# Patient Record
Sex: FEMALE | Race: WHITE | URBAN - METROPOLITAN AREA
[De-identification: names, ages, dates, MRNs, and addresses within clinical notes are randomized per-mention and may not be internally consistent; named-entity substitution may affect disease eponyms.]

---

## 2017-09-10 ENCOUNTER — TRANSFERRED RECORDS (OUTPATIENT)
Dept: HEALTH INFORMATION MANAGEMENT | Facility: CLINIC | Age: 47
End: 2017-09-10

## 2017-09-14 ENCOUNTER — TRANSFERRED RECORDS (OUTPATIENT)
Dept: HEALTH INFORMATION MANAGEMENT | Facility: CLINIC | Age: 47
End: 2017-09-14

## 2017-09-15 ENCOUNTER — TRANSFERRED RECORDS (OUTPATIENT)
Dept: HEALTH INFORMATION MANAGEMENT | Facility: CLINIC | Age: 47
End: 2017-09-15

## 2017-09-19 ENCOUNTER — TRANSFERRED RECORDS (OUTPATIENT)
Dept: HEALTH INFORMATION MANAGEMENT | Facility: CLINIC | Age: 47
End: 2017-09-19

## 2017-09-25 ENCOUNTER — HOSPITAL ENCOUNTER (INPATIENT)
Facility: CLINIC | Age: 47
LOS: 9 days | Discharge: NURSING, REHAB OR LONG-TERM CARE | DRG: 742 | End: 2017-10-04
Attending: OBSTETRICS & GYNECOLOGY | Admitting: OBSTETRICS & GYNECOLOGY
Payer: COMMERCIAL

## 2017-09-25 ENCOUNTER — APPOINTMENT (OUTPATIENT)
Dept: GENERAL RADIOLOGY | Facility: CLINIC | Age: 47
DRG: 742 | End: 2017-09-25
Attending: OBSTETRICS & GYNECOLOGY
Payer: COMMERCIAL

## 2017-09-25 DIAGNOSIS — Z90.710 S/P HYSTERECTOMY: ICD-10-CM

## 2017-09-25 DIAGNOSIS — G62.9 NEUROPATHY: ICD-10-CM

## 2017-09-25 DIAGNOSIS — D70.8 OTHER NEUTROPENIA (H): ICD-10-CM

## 2017-09-25 DIAGNOSIS — R19.00 PELVIC MASS: Primary | ICD-10-CM

## 2017-09-25 DIAGNOSIS — G61.0 GUILLAIN-BARRE SYNDROME (H): ICD-10-CM

## 2017-09-25 DIAGNOSIS — K63.0 INTESTINAL DIVERTICULAR ABSCESS: ICD-10-CM

## 2017-09-25 DIAGNOSIS — G89.18 ACUTE POST-OPERATIVE PAIN: ICD-10-CM

## 2017-09-25 LAB
ALBUMIN SERPL-MCNC: 3 G/DL (ref 3.4–5)
ALP SERPL-CCNC: 60 U/L (ref 40–150)
ALT SERPL W P-5'-P-CCNC: 48 U/L (ref 0–50)
ANION GAP SERPL CALCULATED.3IONS-SCNC: 8 MMOL/L (ref 3–14)
AST SERPL W P-5'-P-CCNC: 45 U/L (ref 0–45)
BASOPHILS # BLD AUTO: 0 10E9/L (ref 0–0.2)
BASOPHILS NFR BLD AUTO: 1.3 %
BILIRUB SERPL-MCNC: 0.3 MG/DL (ref 0.2–1.3)
BUN SERPL-MCNC: 12 MG/DL (ref 7–30)
CALCIUM SERPL-MCNC: 9.4 MG/DL (ref 8.5–10.1)
CHLORIDE SERPL-SCNC: 102 MMOL/L (ref 94–109)
CO2 SERPL-SCNC: 25 MMOL/L (ref 20–32)
CREAT SERPL-MCNC: 0.66 MG/DL (ref 0.52–1.04)
DIFFERENTIAL METHOD BLD: ABNORMAL
EOSINOPHIL # BLD AUTO: 0.1 10E9/L (ref 0–0.7)
EOSINOPHIL NFR BLD AUTO: 2.2 %
ERYTHROCYTE [DISTWIDTH] IN BLOOD BY AUTOMATED COUNT: 19 % (ref 10–15)
GFR SERPL CREATININE-BSD FRML MDRD: >90 ML/MIN/1.7M2
GLUCOSE SERPL-MCNC: 83 MG/DL (ref 70–99)
HCT VFR BLD AUTO: 37.2 % (ref 35–47)
HGB BLD-MCNC: 12 G/DL (ref 11.7–15.7)
IMM GRANULOCYTES # BLD: 0 10E9/L (ref 0–0.4)
IMM GRANULOCYTES NFR BLD: 0.3 %
LYMPHOCYTES # BLD AUTO: 1 10E9/L (ref 0.8–5.3)
LYMPHOCYTES NFR BLD AUTO: 30.4 %
MAGNESIUM SERPL-MCNC: 2.3 MG/DL (ref 1.6–2.3)
MCH RBC QN AUTO: 27.7 PG (ref 26.5–33)
MCHC RBC AUTO-ENTMCNC: 32.3 G/DL (ref 31.5–36.5)
MCV RBC AUTO: 86 FL (ref 78–100)
MONOCYTES # BLD AUTO: 0.5 10E9/L (ref 0–1.3)
MONOCYTES NFR BLD AUTO: 16 %
NEUTROPHILS # BLD AUTO: 1.6 10E9/L (ref 1.6–8.3)
NEUTROPHILS NFR BLD AUTO: 49.8 %
NRBC # BLD AUTO: 0 10*3/UL
NRBC BLD AUTO-RTO: 0 /100
PLATELET # BLD AUTO: 188 10E9/L (ref 150–450)
POTASSIUM SERPL-SCNC: 4.3 MMOL/L (ref 3.4–5.3)
PROT SERPL-MCNC: 8.8 G/DL (ref 6.8–8.8)
RBC # BLD AUTO: 4.33 10E12/L (ref 3.8–5.2)
SODIUM SERPL-SCNC: 135 MMOL/L (ref 133–144)
WBC # BLD AUTO: 3.2 10E9/L (ref 4–11)

## 2017-09-25 PROCEDURE — 40000275 ZZH STATISTIC RCP TIME EA 10 MIN

## 2017-09-25 PROCEDURE — 94150 VITAL CAPACITY TEST: CPT

## 2017-09-25 PROCEDURE — 93010 ELECTROCARDIOGRAM REPORT: CPT | Performed by: INTERNAL MEDICINE

## 2017-09-25 PROCEDURE — 12000001 ZZH R&B MED SURG/OB UMMC

## 2017-09-25 PROCEDURE — 36592 COLLECT BLOOD FROM PICC: CPT | Performed by: NURSE PRACTITIONER

## 2017-09-25 PROCEDURE — 93005 ELECTROCARDIOGRAM TRACING: CPT

## 2017-09-25 PROCEDURE — 25000128 H RX IP 250 OP 636: Performed by: NURSE PRACTITIONER

## 2017-09-25 PROCEDURE — 80053 COMPREHEN METABOLIC PANEL: CPT | Performed by: NURSE PRACTITIONER

## 2017-09-25 PROCEDURE — 25000132 ZZH RX MED GY IP 250 OP 250 PS 637: Performed by: OBSTETRICS & GYNECOLOGY

## 2017-09-25 PROCEDURE — 85025 COMPLETE CBC W/AUTO DIFF WBC: CPT | Performed by: NURSE PRACTITIONER

## 2017-09-25 PROCEDURE — 25000132 ZZH RX MED GY IP 250 OP 250 PS 637: Performed by: NURSE PRACTITIONER

## 2017-09-25 PROCEDURE — 40000986 XR CHEST PORT 1 VW

## 2017-09-25 PROCEDURE — 83735 ASSAY OF MAGNESIUM: CPT | Performed by: NURSE PRACTITIONER

## 2017-09-25 RX ORDER — POLYETHYLENE GLYCOL 3350 17 G/17G
17 POWDER, FOR SOLUTION ORAL DAILY PRN
Status: DISCONTINUED | OUTPATIENT
Start: 2017-09-25 | End: 2017-09-27

## 2017-09-25 RX ORDER — CALCIUM CARBONATE 500 MG/1
500 TABLET, CHEWABLE ORAL 2 TIMES DAILY PRN
Status: DISCONTINUED | OUTPATIENT
Start: 2017-09-25 | End: 2017-09-27

## 2017-09-25 RX ORDER — PROCHLORPERAZINE 25 MG
25 SUPPOSITORY, RECTAL RECTAL EVERY 12 HOURS PRN
Status: DISCONTINUED | OUTPATIENT
Start: 2017-09-25 | End: 2017-09-27

## 2017-09-25 RX ORDER — GABAPENTIN 300 MG/1
600 CAPSULE ORAL 3 TIMES DAILY
Status: DISCONTINUED | OUTPATIENT
Start: 2017-09-25 | End: 2017-09-25

## 2017-09-25 RX ORDER — HYDROXYZINE HYDROCHLORIDE 25 MG/1
50 TABLET, FILM COATED ORAL EVERY 6 HOURS PRN
Status: DISCONTINUED | OUTPATIENT
Start: 2017-09-25 | End: 2017-09-28

## 2017-09-25 RX ORDER — NALOXONE HYDROCHLORIDE 0.4 MG/ML
.1-.4 INJECTION, SOLUTION INTRAMUSCULAR; INTRAVENOUS; SUBCUTANEOUS
Status: DISCONTINUED | OUTPATIENT
Start: 2017-09-25 | End: 2017-09-27

## 2017-09-25 RX ORDER — HYDROXYZINE HYDROCHLORIDE 25 MG/1
25 TABLET, FILM COATED ORAL EVERY 6 HOURS PRN
Status: DISCONTINUED | OUTPATIENT
Start: 2017-09-25 | End: 2017-09-28

## 2017-09-25 RX ORDER — HYDROMORPHONE HYDROCHLORIDE 1 MG/ML
0.5 INJECTION, SOLUTION INTRAMUSCULAR; INTRAVENOUS; SUBCUTANEOUS
Status: DISCONTINUED | OUTPATIENT
Start: 2017-09-25 | End: 2017-09-26

## 2017-09-25 RX ORDER — NALOXONE HYDROCHLORIDE 0.4 MG/ML
.1-.4 INJECTION, SOLUTION INTRAMUSCULAR; INTRAVENOUS; SUBCUTANEOUS
Status: DISCONTINUED | OUTPATIENT
Start: 2017-09-25 | End: 2017-09-25

## 2017-09-25 RX ORDER — CARBOXYMETHYLCELLULOSE SODIUM 5 MG/ML
1 SOLUTION/ DROPS OPHTHALMIC 3 TIMES DAILY PRN
Status: DISCONTINUED | OUTPATIENT
Start: 2017-09-25 | End: 2017-09-27

## 2017-09-25 RX ORDER — AMOXICILLIN 250 MG
2 CAPSULE ORAL 2 TIMES DAILY
COMMUNITY

## 2017-09-25 RX ORDER — OXYCODONE HYDROCHLORIDE 5 MG/1
5-10 TABLET ORAL EVERY 4 HOURS PRN
Status: DISCONTINUED | OUTPATIENT
Start: 2017-09-25 | End: 2017-09-26

## 2017-09-25 RX ORDER — AMOXICILLIN 250 MG
1-2 CAPSULE ORAL 2 TIMES DAILY
Status: DISCONTINUED | OUTPATIENT
Start: 2017-09-25 | End: 2017-09-27

## 2017-09-25 RX ORDER — ONDANSETRON 4 MG/1
4 TABLET, ORALLY DISINTEGRATING ORAL EVERY 6 HOURS PRN
Status: DISCONTINUED | OUTPATIENT
Start: 2017-09-25 | End: 2017-09-27

## 2017-09-25 RX ORDER — HYDROMORPHONE HYDROCHLORIDE 1 MG/ML
.3-.5 INJECTION, SOLUTION INTRAMUSCULAR; INTRAVENOUS; SUBCUTANEOUS
Status: DISCONTINUED | OUTPATIENT
Start: 2017-09-25 | End: 2017-09-25

## 2017-09-25 RX ORDER — GABAPENTIN 300 MG/1
900 CAPSULE ORAL 2 TIMES DAILY
Status: DISCONTINUED | OUTPATIENT
Start: 2017-09-25 | End: 2017-09-26

## 2017-09-25 RX ORDER — ONDANSETRON 2 MG/ML
4 INJECTION INTRAMUSCULAR; INTRAVENOUS EVERY 6 HOURS PRN
Status: DISCONTINUED | OUTPATIENT
Start: 2017-09-25 | End: 2017-09-27

## 2017-09-25 RX ORDER — HYDROMORPHONE HYDROCHLORIDE 1 MG/ML
1 SOLUTION ORAL
Status: ON HOLD | COMMUNITY
End: 2017-10-03

## 2017-09-25 RX ORDER — HYDROCORTISONE 2.5 %
CREAM (GRAM) TOPICAL 2 TIMES DAILY
COMMUNITY

## 2017-09-25 RX ORDER — BENZOCAINE/MENTHOL 6 MG-10 MG
LOZENGE MUCOUS MEMBRANE 3 TIMES DAILY PRN
Status: DISCONTINUED | OUTPATIENT
Start: 2017-09-25 | End: 2017-09-27

## 2017-09-25 RX ORDER — PROCHLORPERAZINE MALEATE 5 MG
5-10 TABLET ORAL EVERY 6 HOURS PRN
Status: DISCONTINUED | OUTPATIENT
Start: 2017-09-25 | End: 2017-09-27

## 2017-09-25 RX ADMIN — RANITIDINE HYDROCHLORIDE 150 MG: 150 TABLET, FILM COATED ORAL at 19:05

## 2017-09-25 RX ADMIN — HYDROMORPHONE HYDROCHLORIDE 0.5 MG: 1 INJECTION, SOLUTION INTRAMUSCULAR; INTRAVENOUS; SUBCUTANEOUS at 19:05

## 2017-09-25 RX ADMIN — CARBOXYMETHYLCELLULOSE SODIUM 1 DROP: 5 SOLUTION/ DROPS OPHTHALMIC at 22:04

## 2017-09-25 RX ADMIN — GABAPENTIN 900 MG: 300 CAPSULE ORAL at 22:04

## 2017-09-25 RX ADMIN — HYDROMORPHONE HYDROCHLORIDE 0.5 MG: 1 INJECTION, SOLUTION INTRAMUSCULAR; INTRAVENOUS; SUBCUTANEOUS at 16:45

## 2017-09-25 RX ADMIN — OXYCODONE HYDROCHLORIDE 10 MG: 5 TABLET ORAL at 23:14

## 2017-09-25 RX ADMIN — MAGNESIUM HYDROXIDE 30 ML: 400 SUSPENSION ORAL at 19:52

## 2017-09-25 RX ADMIN — ENOXAPARIN SODIUM 40 MG: 40 INJECTION SUBCUTANEOUS at 19:05

## 2017-09-25 RX ADMIN — HYDROXYZINE HYDROCHLORIDE 50 MG: 25 TABLET ORAL at 19:52

## 2017-09-25 RX ADMIN — SENNOSIDES AND DOCUSATE SODIUM 2 TABLET: 8.6; 5 TABLET ORAL at 19:52

## 2017-09-25 RX ADMIN — HYDROMORPHONE HYDROCHLORIDE 0.5 MG: 1 INJECTION, SOLUTION INTRAMUSCULAR; INTRAVENOUS; SUBCUTANEOUS at 22:04

## 2017-09-25 RX ADMIN — OXYCODONE HYDROCHLORIDE 10 MG: 5 TABLET ORAL at 18:32

## 2017-09-25 RX ADMIN — CALCIUM CARBONATE (ANTACID) CHEW TAB 500 MG 500 MG: 500 CHEW TAB at 18:36

## 2017-09-25 ASSESSMENT — PAIN DESCRIPTION - DESCRIPTORS
DESCRIPTORS: ACHING;CONSTANT
DESCRIPTORS: ACHING
DESCRIPTORS: ACHING

## 2017-09-25 NOTE — IP AVS SNAPSHOT
MRN:[id number]                      After Visit Summary   9/25/2017    Lynne Llanos    MRN: [id number]           Thank you!     Thank you for choosing [facility] for your care. Our goal is always to provide you with excellent care. Hearing back from our patients is one way we can continue to improve our services. Please take a few minutes to complete the written survey that you may receive in the mail after you visit with us. Thank you!        Patient Information     Date Of Birth          1970        Designated Caregiver       Most Recent Value    Caregiver    Will someone help with your care after discharge? yes    Name of designated caregiver refuses to name    Phone number of caregiver refuses to give number    Caregiver address refuses to give address      About your hospital stay     You were admitted on:  September 25, 2017 You last received care in the:  [address] [facility]    You were discharged on:  October 4, 2017        Reason for your hospital stay       Guillain Bare Syndrome  Ovarian mass found to be endometrioma  Neuropathy                  Who to Call     For medical emergencies, please call 911.  For non-urgent questions about your medical care, please call your primary care provider or clinic, None  For questions related to your surgery, please call your surgery clinic        Attending Provider     Provider Specialty    Jayme Chavez MD Oncology    Shahana Cordero MD OB/Gyn       Primary Care Provider Fax #    Physician No Ref-Primary [phone]      After Care Instructions     Activity - Ambulate in hallway       Every shift            Activity - Up ad alda           Advance Diet as Tolerated       Follow this diet upon discharge:     Regular Diet Adult            Discharge Instructions       Remove staples at about POD#14, one week from day of discharge            Fall precautions           General info for SNF       Length of Stay Estimate: Short Term Care:  Estimated # of Days <30  Condition at Discharge: Improving  Level of care:board and care  Rehabilitation Potential: Good  Admission H&P remains valid and up-to-date: Yes  Recent Chemotherapy: N/A  Use Nursing Home Standing Orders: No            IV access       Peripheral IV.            Mantoux instructions       Give two-step Mantoux (PPD) Per Facility Policy Yes            Weight bearing status       Able to bear weight, use assistance as needed.            Wound care       Site:   abdomen  Instructions:  Keep clean and dry, monitor for s/s of infection including erythema, induration, increased pain or drainage    Plan to follow up with Dr. Haley for GYN issues                  Follow-up Appointments     Follow Up and recommended labs and tests       Follow up with GYN in 1 week for postop care  Follow up with Neurology in 4-8 weeks for Gullian Bare syndrome  Follow CBC trend given leukopenia to ensure it resolves, pending Hematology work up here, no evidence of leukemia or lymphoma at this time. If persistent leukopenia, Hematology recommends follow up for bone marrow biopsy.  Physical Medicine & Rehab recommends TCU cares on discharge.                  Your next 10 appointments already scheduled     Nov 14, 2017 12:00 PM CST   (Arrive by 11:45 AM)   Return Visit with Shahana Cordero MD   [facility] ([facility])    [address] [place] [address]  [address] [facility] [address] [phone]              Future tests that were ordered for you     ABO/Rh Type and Screen                 Additional Information     If you use hormonal birth control (such as the pill, patch, ring or implants): You'll need a second form of birth control for 7 days (condoms, a diaphragm or contraceptive foam). While in the hospital, you received a medicine called Bridion. Your normal birth control will not work as well for a week after taking this medicine.          Pending Results     No orders  "found from 2017 to 2017.            Statement of Approval     Ordered          10/03/17 1044  I have reviewed and agree with all the recommendations and orders detailed in this document.  EFFECTIVE NOW     Approved and electronically signed by:  Viky Ott MD             Admission Information     Date & Time Provider Department Dept. Phone    2017 Shahana Cordero MD [address] [facility] [phone]      Your Vitals Were     Blood Pressure Pulse Temperature Respirations Height Weight    104/67 (BP Location: Left arm) 74 97.7  F (36.5  C) (Oral) 18 1.753 m (5' 9\") 100.1 kg (220 lb 11.2 oz)    Pulse Oximetry BMI (Body Mass Index)                97% 32.59 kg/m2          [email or web address] Information     [employer] lets you send messages to your doctor, view your test results, renew your prescriptions, schedule appointments and more. To sign up, go to [email or web address] . Click on \"Log in\" on the left side of the screen, which will take you to the Welcome page. Then click on \"Sign up Now\" on the right side of the page.     You will be asked to enter the access code listed below, as well as some personal information. Please follow the directions to create your username and password.     Your access code is: [email or web address]  Expires: 2017  3:58 PM     Your access code will  in 90 days. If you need help or a new code, please call your [facility] clinic or [phone].        Care EveryWhere ID     This is your Care EveryWhere ID. This could be used by other organizations to access your [facility] medical records  [id number]        Equal Access to Services     [facility] AH: Hadii sarah Lara, christelleda lucia, qaybta jamshidalkrzysztof josue . So [facility] [phone].    ATENCIÓN: Si habla español, tiene a lara disposición servicios gratuitos de asistencia lingüística. Llame al [phone].    We comply with applicable federal civil rights laws and [place] " laws. We do not discriminate on the basis of race, color, national origin, age, disability, sex, sexual orientation, or gender identity.               Review of your medicines      START taking        Dose / Directions    ciprofloxacin 500 MG tablet   Commonly known as:  CIPRO   Indication:  Infection Within the Abdomen   Used for:  Intestinal diverticular abscess        Dose:  500 mg   Take 1 tablet (500 mg) by mouth every 12 hours for 7 days   Quantity:  14 tablet   Refills:  0       DULoxetine 30 MG EC capsule   Commonly known as:  CYMBALTA   Used for:  Acute post-operative pain        Dose:  30 mg   Take 1 capsule (30 mg) by mouth daily   Quantity:  30 capsule   Refills:  1       ibuprofen 600 MG tablet   Commonly known as:  ADVIL/MOTRIN   Used for:  Acute post-operative pain        Dose:  600 mg   Take 1 tablet (600 mg) by mouth every 6 hours   Quantity:  30 tablet   Refills:  0       methocarbamol 500 MG tablet   Commonly known as:  ROBAXIN        Dose:  500 mg   Take 1 tablet (500 mg) by mouth every 6 hours as needed for muscle spasms   Quantity:  60 tablet   Refills:  0       metroNIDAZOLE 500 MG tablet   Commonly known as:  FLAGYL   Indication:  Infection Within the Abdomen   Used for:  Intestinal diverticular abscess        Dose:  500 mg   Take 1 tablet (500 mg) by mouth every 8 hours for 7 days   Quantity:  21 tablet   Refills:  0       oxyCODONE 10 MG IR tablet   Commonly known as:  ROXICODONE   Used for:  Acute post-operative pain        Dose:  10-15 mg   Take 1-1.5 tablets (10-15 mg) by mouth every 4 hours as needed for moderate to severe pain   Quantity:  90 tablet   Refills:  0       simethicone 80 MG chewable tablet   Commonly known as:  MYLICON        Dose:  120 mg   Take 1.5 tablets (120 mg) by mouth every 6 hours   Quantity:  60 tablet   Refills:  0         CONTINUE these medicines which may have CHANGED, or have new prescriptions. If we are uncertain of the size of tablets/capsules you have at  home, strength may be listed as something that might have changed.        Dose / Directions    gabapentin 400 MG capsule   Commonly known as:  NEURONTIN   This may have changed:    - medication strength  - how much to take  - when to take this        Dose:  1200 mg   Take 3 capsules (1,200 mg) by mouth 3 times daily   Quantity:  90 capsule   Refills:  0         CONTINUE these medicines which have NOT CHANGED        Dose / Directions    enoxaparin 40 MG/0.4ML injection   Commonly known as:  LOVENOX        Dose:  40 mg   Inject 40 mg Subcutaneous daily   Refills:  0       hydrocortisone 2.5 % cream        Apply topically 2 times daily   Refills:  0       magnesium hydroxide 400 MG/5ML suspension   Commonly known as:  MILK OF MAGNESIA        Dose:  30 mL   Take 30 mLs by mouth 2 times daily   Refills:  0       senna-docusate 8.6-50 MG per tablet   Commonly known as:  SENOKOT-S;PERICOLACE        Dose:  2 tablet   Take 2 tablets by mouth 2 times daily   Refills:  0       ZANTAC PO        Dose:  150 mg   Take 150 mg by mouth 2 times daily   Refills:  0         STOP taking     HYDROmorphone (STANDARD CONC) 1 MG/ML Liqd liquid   Commonly known as:  DILAUDID                Where to get your medicines      Some of these will need a paper prescription and others can be bought over the counter. Ask your nurse if you have questions.     Bring a paper prescription for each of these medications     ciprofloxacin 500 MG tablet    DULoxetine 30 MG EC capsule    gabapentin 400 MG capsule    ibuprofen 600 MG tablet    methocarbamol 500 MG tablet    metroNIDAZOLE 500 MG tablet    oxyCODONE 10 MG IR tablet    simethicone 80 MG chewable tablet               ANTIBIOTIC INSTRUCTION     You've Been Prescribed an Antibiotic - Now What?  Your healthcare team thinks that you or your loved one might have an infection. Some infections can be treated with antibiotics, which are powerful, life-saving drugs. Like all medications, antibiotics have  side effects and should only be used when necessary. There are some important things you should know about your antibiotic treatment.      Your healthcare team may run tests before you start taking an antibiotic.    Your team may take samples (e.g., from your blood, urine or other areas) to run tests to look for bacteria. These test can be important to determine if you need an antibiotic at all and, if you do, which antibiotic will work best.      Within a few days, your healthcare team might change or even stop your antibiotic.    Your team may start you on an antibiotic while they are working to find out what is making you sick.    Your team might change your antibiotic because test results show that a different antibiotic would be better to treat your infection.    In some cases, once your team has more information, they learn that you do not need an antibiotic at all. They may find out that you don't have an infection, or that the antibiotic you're taking won't work against your infection. For example, an infection caused by a virus can't be treated with antibiotics. Staying on an antibiotic when you don't need it is more likely to be harmful than helpful.      You may experience side effects from your antibiotic.    Like all medications, antibiotics have side effects. Some of these can be serious.    Let you healthcare team know if you have any known allergies when you are admitted to the hospital.    One significant side effect of nearly all antibiotics is the risk of severe and sometimes deadly diarrhea caused by Clostridium difficile (C. Difficile). This occurs when a person takes antibiotics because some good germs are destroyed. Antibiotic use allows C. diificile to take over, putting patients at high risk for this serious infection.    As a patient or caregiver, it is important to understand your or your loved one's antibiotic treatment. It is especially important for caregivers to speak up when patients  can't speak for themselves. Here are some important questions to ask your healthcare team.    What infection is this antibiotic treating and how do you know I have that infection?    What side effects might occur from this antibiotic?    How long will I need to take this antibiotic?    Is it safe to take this antibiotic with other medications or supplements (e.g., vitamins) that I am taking?     Are there any special directions I need to know about taking this antibiotic? For example, should I take it with food?    How will I be monitored to know whether my infection is responding to the antibiotic?    What tests may help to make sure the right antibiotic is prescribed for me?      Information provided by:  www.cdc.gov/getsmart  U.S. Department of Health and Human Services  Centers for disease Control and Prevention  National Center for Emerging and Zoonotic Infectious Diseases  Division of Healthcare Quality Promotion         Protect others around you: Learn how to safely use, store and throw away your medicines at www.disposemymeds.org.             Medication List: This is a list of all your medications and when to take them. Check marks below indicate your daily home schedule. Keep this list as a reference.      Medications           Morning Afternoon Evening Bedtime As Needed    ciprofloxacin 500 MG tablet   Commonly known as:  CIPRO   Take 1 tablet (500 mg) by mouth every 12 hours for 7 days   Last time this was given:  500 mg on 10/3/2017  8:16 PM                                DULoxetine 30 MG EC capsule   Commonly known as:  CYMBALTA   Take 1 capsule (30 mg) by mouth daily   Last time this was given:  30 mg on 10/3/2017  9:06 AM                                enoxaparin 40 MG/0.4ML injection   Commonly known as:  LOVENOX   Inject 40 mg Subcutaneous daily   Last time this was given:  40 mg on 10/3/2017  4:32 PM                                gabapentin 400 MG capsule   Commonly known as:  NEURONTIN   Take 3  capsules (1,200 mg) by mouth 3 times daily   Last time this was given:  1,200 mg on 10/3/2017  8:15 PM                                hydrocortisone 2.5 % cream   Apply topically 2 times daily                                ibuprofen 600 MG tablet   Commonly known as:  ADVIL/MOTRIN   Take 1 tablet (600 mg) by mouth every 6 hours   Last time this was given:  600 mg on 10/4/2017  1:01 AM                                magnesium hydroxide 400 MG/5ML suspension   Commonly known as:  MILK OF MAGNESIA   Take 30 mLs by mouth 2 times daily   Last time this was given:  30 mLs on 10/3/2017  8:16 PM                                methocarbamol 500 MG tablet   Commonly known as:  ROBAXIN   Take 1 tablet (500 mg) by mouth every 6 hours as needed for muscle spasms   Last time this was given:  500 mg on 10/4/2017  1:01 AM                                metroNIDAZOLE 500 MG tablet   Commonly known as:  FLAGYL   Take 1 tablet (500 mg) by mouth every 8 hours for 7 days   Last time this was given:  500 mg on 10/4/2017 12:06 AM                                oxyCODONE 10 MG IR tablet   Commonly known as:  ROXICODONE   Take 1-1.5 tablets (10-15 mg) by mouth every 4 hours as needed for moderate to severe pain   Last time this was given:  15 mg on 10/4/2017  3:21 AM                                senna-docusate 8.6-50 MG per tablet   Commonly known as:  SENOKOT-S;PERICOLACE   Take 2 tablets by mouth 2 times daily   Last time this was given:  2 tablets on 10/2/2017  8:19 AM                                simethicone 80 MG chewable tablet   Commonly known as:  MYLICON   Take 1.5 tablets (120 mg) by mouth every 6 hours   Last time this was given:  120 mg on 10/3/2017  4:32 PM                                ZANTAC PO   Take 150 mg by mouth 2 times daily

## 2017-09-25 NOTE — IP AVS SNAPSHOT
"     Lynne Llanos #[id number] (CSN: [id number])  (47 year old F)  (Adm: 17)     [id number]               [address] [facility]: [phone]            Patient Demographics     Patient Name Sex          Age SSN Address Phone    Lynne Llanos Female 1970 (47 year old) [id number] [place] [address] [phone] (Home)      Emergency Contact(s)     Name Relation Home Work Mobile    GRETA JAIMES Friend [phone]      More Llanos Daughter [phone]        Admission Information     Attending Provider Admitting Provider Admission Type Admission Date/Time    Shahana Cordero MD Mullany, Sally, MD Urgent 17  1508    Discharge Date Hospital Service Auth/Cert Status Service Area     OB/Gyn [facility]    Unit Room/Bed Admission Status       [facility] 7409/7409-01 Admission (Confirmed)       Admission     Complaint    ovarian mass, pelvic mass , Pelvic mass, Adnexal mass, S/P hysterectomy      Hospital Account     Name Acct ID Class Status Primary Coverage    Lynne Llanos [id number] Inpatient Open [employer] OPEN ACCESS FULLY INSURED            Guarantor Account (for Hospital Account #[id number])     Name Relation to Pt Service Area Active? Acct Type    Lynne Llanos Self FCS Yes Personal/Family    Address Phone          [address] [place] [id number] [phone](H)              Coverage Information (for Hospital Account #[id number])     F/O Payor/Plan Precert #    [employer] OPEN ACCESS FULLY INSURED     Subscriber Subscriber #    Lynne Llanos [id number]    Address Phone    [address]  [place] [address] [phone]                                                INTERAGENCY TRANSFER FORM - PHYSICIAN ORDERS   2017                       [address] [facility]: [phone]            Attending Provider: Shahana Cordero MD     Allergies:  Morphine    Infection:  None   Service:  OB/Gyn    Ht:  1.753 m (5' 9\")   Wt:  " 100.1 kg (220 lb 11.2 oz)   Admission Wt:  100.1 kg (220 lb 11.2 oz)    BMI:  32.59 kg/m 2   BSA:  2.21 m 2            ED Clinical Impression     Diagnosis Description Comment Added By Time Added    Pelvic mass [R19.00] Pelvic mass [R19.00]  Shahana Ospina MD 9/26/2017  8:09 PM    Other neutropenia (H) [D70.8] Other neutropenia (H) [D70.8]  Lizzette Garcia MD 10/1/2017  9:45 AM    S/P hysterectomy [Z90.710] S/P hysterectomy [Z90.710]  Viky Ott MD 10/3/2017  9:37 AM    Acute post-operative pain [G89.18] Acute post-operative pain [G89.18]  Viky Ott MD 10/3/2017  9:37 AM    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]  Viky Ott MD 10/3/2017  9:38 AM    Neuropathy (H) [G62.9] Neuropathy (H) [G62.9]  Viky Ott MD 10/3/2017  9:39 AM    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]  Viky Ott MD 10/3/2017  9:46 AM      Hospital Problems as of 10/3/2017              Priority Class Noted POA    Pelvic mass Medium  9/25/2017 Yes    Guillain-Freeman syndrome (H) Medium  9/26/2017 Yes    Elevated CA-125 Medium  9/26/2017 Yes    Neuropathy (H) Medium  9/26/2017 Yes    Facial droop Medium  9/26/2017 Yes    Hyponatremia Medium  9/26/2017 Yes    Obesity Medium  9/26/2017 Yes    Perimenopausal Medium  9/26/2017 Yes    Adnexal mass Medium  9/26/2017 Yes    S/P hysterectomy Medium  9/27/2017 Yes      Non-Hospital Problems as of 10/3/2017     None      Code Status History     Date Active Date Inactive Code Status Order ID Comments User Context    9/25/2017  5:43 PM 9/27/2017  2:37 PM Full Code [id number]  Crista Bennett APRN CNP Inpatient      Current Code Status     Date Active Code Status Order ID Comments User Context       9/27/2017  2:37 PM Full Code [id number]  Shahana Ospina MD Inpatient       Summary of Visit     Reason for your hospital stay       Guillain Bare Syndrome  Ovarian mass found to be endometrioma  Neuropathy                 Medication Review      START taking        Dose / Directions Comments    ciprofloxacin 500 MG tablet   Commonly known as:  CIPRO   Indication:  Infection Within the Abdomen   Used for:  Intestinal diverticular abscess        Dose:  500 mg   Take 1 tablet (500 mg) by mouth every 12 hours for 7 days   Quantity:  14 tablet   Refills:  0        DULoxetine 30 MG EC capsule   Commonly known as:  CYMBALTA   Used for:  Acute post-operative pain        Dose:  30 mg   Take 1 capsule (30 mg) by mouth daily   Quantity:  30 capsule   Refills:  1        ibuprofen 600 MG tablet   Commonly known as:  ADVIL/MOTRIN   Used for:  Acute post-operative pain        Dose:  600 mg   Take 1 tablet (600 mg) by mouth every 6 hours   Quantity:  30 tablet   Refills:  0        methocarbamol 500 MG tablet   Commonly known as:  ROBAXIN        Dose:  500 mg   Take 1 tablet (500 mg) by mouth every 6 hours as needed for muscle spasms   Quantity:  60 tablet   Refills:  0        metroNIDAZOLE 500 MG tablet   Commonly known as:  FLAGYL   Indication:  Infection Within the Abdomen   Used for:  Intestinal diverticular abscess        Dose:  500 mg   Take 1 tablet (500 mg) by mouth every 8 hours for 7 days   Quantity:  21 tablet   Refills:  0        oxyCODONE 10 MG IR tablet   Commonly known as:  ROXICODONE   Used for:  Acute post-operative pain        Dose:  10-15 mg   Take 1-1.5 tablets (10-15 mg) by mouth every 4 hours as needed for moderate to severe pain   Quantity:  90 tablet   Refills:  0        simethicone 80 MG chewable tablet   Commonly known as:  MYLICON        Dose:  120 mg   Take 1.5 tablets (120 mg) by mouth every 6 hours   Quantity:  60 tablet   Refills:  0          CONTINUE these medications which may have CHANGED, or have new prescriptions. If we are uncertain of the size of tablets/capsules you have at home, strength may be listed as something that might have changed.        Dose / Directions Comments    gabapentin 400 MG capsule    Commonly known as:  NEURONTIN   This may have changed:    - medication strength  - how much to take  - when to take this        Dose:  1200 mg   Take 3 capsules (1,200 mg) by mouth 3 times daily   Quantity:  90 capsule   Refills:  0          CONTINUE these medications which have NOT CHANGED        Dose / Directions Comments    enoxaparin 40 MG/0.4ML injection   Commonly known as:  LOVENOX        Dose:  40 mg   Inject 40 mg Subcutaneous daily   Refills:  0        hydrocortisone 2.5 % cream        Apply topically 2 times daily   Refills:  0        magnesium hydroxide 400 MG/5ML suspension   Commonly known as:  MILK OF MAGNESIA        Dose:  30 mL   Take 30 mLs by mouth 2 times daily   Refills:  0        senna-docusate 8.6-50 MG per tablet   Commonly known as:  SENOKOT-S;PERICOLACE        Dose:  2 tablet   Take 2 tablets by mouth 2 times daily   Refills:  0        ZANTAC PO        Dose:  150 mg   Take 150 mg by mouth 2 times daily   Refills:  0          STOP taking     HYDROmorphone (STANDARD CONC) 1 MG/ML Liqd liquid   Commonly known as:  DILAUDID                   After Care     Activity - Ambulate in hallway       Every shift       Activity - Up ad adla           Advance Diet as Tolerated       Follow this diet upon discharge:     Regular Diet Adult       Fall precautions           General info for SNF       Length of Stay Estimate: Short Term Care: Estimated # of Days <30  Condition at Discharge: Improving  Level of care:board and care  Rehabilitation Potential: Good  Admission H&P remains valid and up-to-date: Yes  Recent Chemotherapy: N/A  Use Nursing Home Standing Orders: No       IV access       Peripheral IV.       Mantoux instructions       Give two-step Mantoux (PPD) Per Facility Policy Yes       Weight bearing status       Able to bear weight, use assistance as needed.       Wound care       Site:   abdomen  Instructions:  Keep clean and dry, monitor for s/s of infection including erythema, induration,  "increased pain or drainage    Plan to follow up with Dr. Haley for GYN issues             Blood Bank Orders     ABO/Rh Type and Screen                 Follow-Up Appointment Instructions     Follow Up and recommended labs and tests       Follow up with GYN in 1 week for postop care  Follow up with Neurology in 4-8 weeks for Gullian Bare syndrome  Follow CBC trend given leukopenia to ensure it resolves, pending Hematology work up here, no evidence of leukemia or lymphoma at this time. If persistent leukopenia, Hematology recommends follow up for bone marrow biopsy.  Physical Medicine & Rehab recommends TCU cares on discharge.             Your next 10 appointments already scheduled     Nov 14, 2017 12:00 PM CST   (Arrive by 11:45 AM)   Return Visit with Shahana Cordero MD   [facility] ([facility])    [address] [place] [address] [phone]              Statement of Approval     Ordered          10/03/17 1044  I have reviewed and agree with all the recommendations and orders detailed in this document.  EFFECTIVE NOW     Approved and electronically signed by:  Viky Ott MD                                                 INTERAGENCY TRANSFER FORM - NURSING   9/25/2017                       [address] [facility]: [phone]            Attending Provider: Shahana Cordero MD     Allergies:  Morphine    Infection:  None   Service:  OB/Gyn    Ht:  1.753 m (5' 9\")   Wt:  100.1 kg (220 lb 11.2 oz)   Admission Wt:  100.1 kg (220 lb 11.2 oz)    BMI:  32.59 kg/m 2   BSA:  2.21 m 2            Advance Directives        Does patient have a scanned Advance Directive/ACP document in EPIC?           No        Immunizations     None      ASSESSMENT     Discharge Profile Flowsheet     EXPECTED DISCHARGE     Patient's communication style  spoken language (English or Bilingual) 09/25/17 6736    Expected Discharge Date  10/03/17 (ARU vs TCU) 10/02/17 1057 " "  SKIN      DISCHARGE NEEDS ASSESSMENT     Inspection of bony prominences  Full 10/03/17 1704    Equipment Currently Used at Home  none 09/28/17 1457   Skin WDL  ex 10/03/17 1704    Transportation Available  -- ([facility] Wheelchair ride @ 6:00 p.m.Weds. 10/3//2017) 10/03/17 1522   Skin Integrity  incision(s) 10/03/17 1704    GASTROINTESTINAL (ADULT,PEDIATRIC,OB)     Full except areas not inspected   Coccyx;Sacrum;Buttock, right;Buttock, left 10/02/17 1732    GI WDL  ex 10/03/17 1648   Inspection under devices  Full 10/03/17 1704    Abdominal Appearance  contour irregular 10/03/17 1648   Skin Temperature  warm 10/03/17 1704    All Quadrants Bowel Sounds  audible and active in all quadrants 10/03/17 1648   Skin Moisture  dry 10/03/17 1042    Last Bowel Movement  10/02/17 10/03/17 1648   SAFETY      GI Signs/Symptoms  abdominal pain 10/03/17 0506   Safety WDL  WDL 10/03/17 1706    Passing flatus  no 10/03/17 1648   Safety Factors  ID band on;upper side rails raised x 2;call light in reach;bed in low position;wheels locked 10/03/17 1706    COMMUNICATION ASSESSMENT     All Alarms  none present 10/03/17 1706                 Assessment WDL (Within Defined Limits) Definitions           Safety WDL     Effective: 09/28/15    Row Information: <b>WDL Definition:</b> Bed in low position, wheels locked; call light in reach; upper side rails up x 2; ID band on<br> <font color=\"gray\"><i>Item=AS safety wdl>>List=AS safety wdl>>Version=F14</i></font>      Skin WDL     Effective: 09/28/15    Row Information: <b>WDL Definition:</b> Warm; dry; intact; elastic; without discoloration; pressure points without redness<br> <font color=\"gray\"><i>Item=AS skin wdl>>List=AS skin wdl>>Version=F14</i></font>      Vitals     Vital Signs Flowsheet     COMMENTS     CLINICALLY ALIGNED PAIN ASSESSMENT (CAPA) ([facility], [facility] ADULTS ONLY)      Comments  Prior to PT session 10/03/17 1601   Comfort  tolerable with discomfort 10/03/17 1954    " "VITAL SIGNS     Change in Pain  about the same 10/03/17 1954    Temp  96.9  F (36.1  C) 10/03/17 0731   Pain Control  partially effective 10/03/17 1954    Temp src  Oral 10/03/17 0731   Functioning  can do most things, but pain gets in the way of some 10/03/17 1954    Resp  16 10/03/17 1224   Sleep  awake with occasional pain 10/03/17 1954    Pulse  74 10/03/17 0731   ANALGESIA SIDE EFFECTS MONITORING      Heart Rate  76 10/03/17 1601   Side Effects Monitoring: Respiratory Quality  R 10/03/17 1954    Pulse/Heart Rate Source  Monitor 10/03/17 1224   Side Effects Monitoring: Respiratory Depth  N 10/03/17 1954    BP  110/70 10/03/17 1601   Side Effects Monitoring: Sedation Level  1 10/03/17 1954    BP Location  Left arm 10/03/17 1601   HEIGHT AND WEIGHT      OXYGEN THERAPY     Height  1.753 m (5' 9\") 09/25/17 1736    SpO2  97 % 10/03/17 1224   Height Method  Stated 09/25/17 1736    O2 Device  None (Room air) 10/03/17 1224   Weight  100.1 kg (220 lb 11.2 oz) 09/25/17 1935    Oxygen Delivery  2 LPM 09/27/17 1413   DAILY CARE      RESPIRATORY MONITORING     Activity Management  activity adjusted per tolerance;activity encouraged 10/03/17 1706    Respiratory Monitoring (EtCO2)  40 mmHg 09/27/17 1436   Activity Assistance Provided  assistance, stand-by 10/03/17 2001    Integrated Pulmonary Index (IPI)  7 09/27/17 1436   Assistive Device Utilized  walker 10/03/17 1706    PAIN/COMFORT     POSITIONING      Patient Currently in Pain  yes 10/03/17 1954   Body Position  independently positioning 10/03/17 1706    Preferred Pain Scale  CAPA (Clinically Aligned Pain Assessment) ([facility] Adults Only) 10/03/17 1954   Head of Bed (HOB)  HOB at 20-30 degrees 10/03/17 1033    Pain Location  Abdomen 10/03/17 1954   Positioning/Transfer Devices  pillows 10/03/17 1706    Pain Orientation  Mid 10/03/17 1842   ECG      Pain Descriptors  Aching 10/03/17 1842   ECG Rhythm  Normal sinus rhythm 09/27/17 1355    Pain " Intervention(s)  Medication (See eMAR);Heat applied 10/03/17 1842   Ectopy  None 09/27/17 1355    Response to Interventions  Decrease in pain 10/03/17 1954   Lead Monitored  Lead II;V 5 09/27/17 1355            Patient Lines/Drains/Airways Status    Active LINES/DRAINS/AIRWAYS     Name: Placement date: Placement time: Site: Days: Last dressing change:    Rash posterior back             Incision/Surgical Site 09/27/17 Abdomen 09/27/17   1157    6             Patient Lines/Drains/Airways Status    Active PICC/CVC     Name: Placement date: Placement time: Site: Days: Additional Info Last dressing change:    PICC Single Lumen 09/18/17 Right 09/18/17         15 Orientation: Right   09/29/17 1300 (103.57 hrs)         Description: Valved;Power PICC            Inserted by: Vasu NEGRON [facility]               Intake/Output Detail Report     Date Intake       Output         Net    Shift P.O. I.V. NG/GT IV Piggyback Total Urine Emesis/NG output Drains Other Blood Total       Yudi 10/02/17 0700 - 10/02/17 1459 240 -- -- -- 240 -- -- -- -- -- -- 240    Noc 10/02/17 1500 - 10/02/17 2359 450 -- -- -- 450 -- -- -- -- -- -- 450    Day 10/03/17 0000 - 10/03/17 0659 240 20 -- -- 260 200 -- -- -- -- 200 60    Yudi 10/03/17 0700 - 10/03/17 1459 600 20 -- -- 620 -- -- -- -- -- -- 620    Noc 10/03/17 1500 - 10/03/17 2359 120 -- -- -- 120 -- -- -- -- -- -- 120      Last Void/BM       Most Recent Value    Urine Occurrence 1 at 10/02/2017 2207    Stool Occurrence 1 at 10/02/2017 2207      Case Management/Discharge Planning     Case Management/Discharge Planning Flowsheet     LIVING ENVIRONMENT     Equipment Currently Used at Home  none 09/28/17 1457    Lives With  other (see comments) (roommates ) 09/28/17 1457   / CAREGIVER      Living Arrangements  mobile home 09/28/17 1457   Filed Complexity Screen Score  6 09/26/17 0842    COPING/STRESS     ABUSE RISK SCREEN      Major Change/Loss/Stressor  none 09/25/17 1827   QUESTION TO PATIENT:  Has a  member of your family or a partner(now or in the past) intimidated, hurt, manipulated, or controlled you in any way?  no 09/25/17 1814    EXPECTED DISCHARGE     QUESTION TO PATIENT: Do you feel safe going back to the place where you are living?  yes 09/25/17 1814    Expected Discharge Date  10/03/17 (ARU vs TCU) 10/02/17 1057   OBSERVATION: Is there reason to believe there has been maltreatment of a vulnerable adult (ie. Physical/Sexual/Emotional abuse, self neglect, lack of adequate food, shelter, medical care, or financial exploitation)?  no 09/25/17 1814    DISCHARGE PLANNING     (R) MENTAL HEALTH SUICIDE RISK      Transportation Available  -- ([facility] Wheelchair ride @ 6:00 p.m.Weds. 10/3//2017) 10/03/17 1522   Are you depressed or being treated for depression?  No 09/25/17 1821    [facility]                         [address] [place] [address] BANK: [phone]            Medication Administration Report for Lynne Llanos as of 10/03/17 2033   Legend:    Given Hold Not Given Due Canceled Entry Other Actions    Time Time (Time) Time  Time-Action       Inactive    Active    Linked        Medications 09/27/17 09/28/17 09/29/17 09/30/17 10/01/17 10/02/17 10/03/17    acetaminophen (TYLENOL) tablet 650 mg  Dose: 650 mg Freq: EVERY 6 HOURS PRN Route: PO  PRN Reasons: mild pain,fever  Start: 09/29/17 1300   Admin Instructions: Do NOT use if patient has an active opioid/acetaminophen analgesic order for pain  Maximum acetaminophen dose from all sources = 75 mg/kg/day not to exceed 4 grams/day.        (0830)-Not Given       (1300)-Not Given              benzocaine-menthol (CEPACOL) 15-3.6 MG lozenge 1-2 lozenge  Dose: 1-2 lozenge Freq: EVERY 1 HOUR PRN Route: BU  PRN Reason: sore throat  PRN Comment: without fever  Start: 09/27/17 1437              bisacodyl (DULCOLAX) Suppository 10 mg  Dose: 10 mg Freq: DAILY Route: RE  Start: 09/27/17 1445   Admin Instructions: Start POD 1.  MUST HOLD for bowel resection or diarrhea.  May discontinue if patient having bowel movement.     (1528)-Not Given [C]        (0900)-Not Given        (0831)-Not Given        (0823)-Not Given        (1030)-Not Given        (0823)-Not Given        (0907)-Not Given           carboxymethylcellulose (CELLUVISC/REFRESH LIQUIGEL) 1 % ophthalmic solution 1 drop  Dose: 1 drop Freq: 2 TIMES DAILY Route: Both Eyes  Start: 10/01/17 2000   Admin Instructions: In the morning and before bedtime         1416 (1 drop)-Given       (2043)-Not Given        (0800)-Not Given       2011 (1 drop)-Given        (0907)-Not Given                  carboxymethylcellulose (REFRESH PLUS) 0.5 % ophthalmic solution 1 drop  Dose: 1 drop Freq: 3 TIMES DAILY PRN Route: Both Eyes  PRN Reason: dry eyes  Start: 09/28/17 1322     1630 (1 drop)-Given        0914 (1 drop)-Given       1849 (1 drop)-Given        1651 (1 drop)-Given        1042 (1 drop)-Given         2015 (1 drop)-Given       2016 (1 drop)-Given           ciprofloxacin (CIPRO) tablet 500 mg  Dose: 500 mg Freq: EVERY 12 HOURS SCHEDULED Route: PO  Indications of Use: INTRA-ABDOMINAL INFECTION  Start: 09/27/17 2000   Admin Instructions: Administer at least 2 hours before or 4 hours after aluminum, calcium, iron, zinc or magnesium containing medications. May be taken with food or on an empty stomach.  Do not administer alone with a dairy product like milk or yogurt or calcium-fortified juice,  but may be administered with a meal containing dairy.     2101 (500 mg)-Given        0902 (500 mg)-Given       2113 (500 mg)-Given        0831 (500 mg)-Given       1940 (500 mg)-Given        0825 (500 mg)-Given       2020 (500 mg)-Given        0910 (500 mg)-Given       2032 (500 mg)-Given        0819 (500 mg)-Given       2013 (500 mg)-Given        0905 (500 mg)-Given       2016 (500 mg)-Given           DULoxetine (CYMBALTA) EC capsule 30 mg  Dose: 30 mg Freq: DAILY Route: PO  Start: 09/29/17 0900      (1044)-Not Given        (0825)-Not Given         (0910)-Not Given       1438 (30 mg)-Given        0819 (30 mg)-Given        0906 (30 mg)-Given           enoxaparin (LOVENOX) injection 40 mg  Dose: 40 mg Freq: EVERY 24 HOURS Route: SC  Start: 10/01/17 1600        1634 (40 mg)-Given        1605 (40 mg)-Given        1632 (40 mg)-Given           gabapentin (NEURONTIN) capsule 1,200 mg  Dose: 1,200 mg Freq: 3 TIMES DAILY Route: PO  Start: 10/01/17 2000        2031 (1,200 mg)-Given        0818 (1,200 mg)-Given       1406 (1,200 mg)-Given       2012 (1,200 mg)-Given        0904 (1,200 mg)-Given       1439 (1,200 mg)-Given       2015 (1,200 mg)-Given           ibuprofen (ADVIL/MOTRIN) tablet 600 mg  Dose: 600 mg Freq: EVERY 6 HOURS Route: PO  Start: 09/27/17 1445   Admin Instructions: IF celecoxib (CELEBREX) was given pre-operatively, start ibuprofen (MOTRIN) 12 hours after celecoxib (CELEBREX) given.     1735 (600 mg)-Given       (2050)-Not Given [C]        0107 (600 mg)-Given       0615 (600 mg)-Given       1214 (600 mg)-Given       1744 (600 mg)-Given        0015 (600 mg)-Given       0618 (600 mg)-Given       1155 (600 mg)-Given       1724 (600 mg)-Given        0024 (600 mg)-Given       0617 (600 mg)-Given       1233 (600 mg)-Given       1746 (600 mg)-Given        0146 (600 mg)-Given       0910 (600 mg)-Given       1416 (600 mg)-Given       2032 (600 mg)-Given        0239 (600 mg)-Given       0818 (600 mg)-Given       1406 (600 mg)-Given       2012 (600 mg)-Given        0108 (600 mg)-Given       0904 (600 mg)-Given       1440 (600 mg)-Given       2016 (600 mg)-Given           ketamine (KETALAR) 5%-gabapentin (NEURONTIN) 8%-lidocaine 2.5% topical PLO cream  Freq: 3 TIMES DAILY Route: Top  Start: 09/30/17 1400   Admin Instructions: Apply to feet/legs bilaterally not to exceed 10% of body area        1649 ( )-Given       2238 ( )-Given        0910 ( )-Given       1657 ( )-Given       (2043)-Not Given        (0800)-Not Given       (1320)-Not Given       (2014)-Not  "Given        (0906)-Not Given       (1641)-Not Given       (1956)-Not Given           lidocaine (LMX4) kit  Freq: EVERY 1 HOUR PRN Route: Top  PRN Reason: pain  PRN Comment: with VAD insertion or accessing implanted port.  Start: 09/27/17 1437   Admin Instructions: Do NOT give if patient has a history of allergy to any local anesthetic or any \"akhil\" product.   Apply 30 minutes prior to VAD insertion or port access.  MAX Dose:  2.5 g (  of 5 g tube)               lidocaine 1 % 1 mL  Dose: 1 mL Freq: EVERY 1 HOUR PRN Route: OTHER  PRN Comment: mild pain with VAD insertion or accessing implanted port  Start: 09/27/17 1437   Admin Instructions: Do NOT give if patient has a history of allergy to any local anesthetic or any \"akhil\" product. MAX dose 1 mL subcutaneous OR intradermal in divided doses.               magnesium hydroxide (MILK OF MAGNESIA) suspension 30 mL  Dose: 30 mL Freq: DAILY PRN Route: PO  PRN Reason: constipation  Start: 09/29/17 0001   Admin Instructions: Shake well. Do not give within 6 hours of receiving ciprofloxacin.       0015 (30 mL)-Given        0051 (30 mL)-Given       1308 (30 mL)-Given [C]         0829 (30 mL)-Given        2016 (30 mL)-Given           menthol (ICY HOT) 5 % patch 1 patch  Dose: 1 patch Freq: EVERY 8 HOURS PRN Route: Top  PRN Reason: muscle soreness  PRN Comment: apply along side inc  Start: 09/29/17 1250   Admin Instructions: Apply to Skin.  Remove 'old patch' and chart on Medication Patch Removal order when new patch is applied. Avoid placing heating pad over the patch.       1939 (1 patch)-Given        0412 (1 patch)-Given             And  menthol (ICY HOT) Patch in Place  Freq: EVERY 8 HOURS Route: TD  Start: 09/29/17 1300   Admin Instructions: Chart every shift, confirming that patch is still in place on patient (no barcode scan needed). See patch order for dose information.       2200 ( )-Patch in Place [C]               0459 ( )-Patch in Place       1233 ( " )-Negative       (2121)-Not Given [C]        (0550)-Not Given [C]       1634 ( )-Negative       2017 ( )-Negative        (0500)-Not Given [C]       (1320)-Not Given       (2005)-Not Given        (0552)-Not Given [C]       1218 ( )-Negative       [ ] 2100          And  menthol (ICY HOT) patch REMOVAL  Freq: EVERY 8 HOURS PRN Route: TD  PRN Comment: for patch removal  Start: 09/29/17 1250   Admin Instructions: Remove patch when new patch is applied or patch is discontinued.        0410 ( )-Given              methocarbamol (ROBAXIN) tablet 500 mg  Dose: 500 mg Freq: EVERY 6 HOURS PRN Route: PO  PRN Reason: muscle spasms  Start: 09/29/17 1249      1434 (500 mg)-Given        0024 (500 mg)-Given       0623 (500 mg)-Given       1308 (500 mg)-Given       1903 (500 mg)-Given        0146 (500 mg)-Given       1040 (500 mg)-Given       1633 (500 mg)-Given       2243 (500 mg)-Given        0516 (500 mg)-Given       1122 (500 mg)-Given       1704 (500 mg)-Given       2312 (500 mg)-Given        0555 (500 mg)-Given       1217 (500 mg)-Given       1846 (500 mg)-Given           metroNIDAZOLE (FLAGYL) tablet 500 mg  Dose: 500 mg Freq: EVERY 8 HOURS SCHEDULED Route: PO  Indications of Use: INTRA-ABDOMINAL INFECTION  Start: 09/27/17 1615    1735 (500 mg)-Given        0107 (500 mg)-Given       0902 (500 mg)-Given       1744 (500 mg)-Given        0015 (500 mg)-Given       0831 (500 mg)-Given       1700 (500 mg)-Given        0152 (500 mg)-Given       0825 (500 mg)-Given       1648 (500 mg)-Given        0151 (500 mg)-Given       1033 (500 mg)-Given       1633 (500 mg)-Given        0239 (500 mg)-Given       1042 (500 mg)-Given       1606 (500 mg)-Given        0109 (500 mg)-Given       1017 (500 mg)-Given       1632 (500 mg)-Given           naloxone (NARCAN) injection 0.1-0.4 mg  Dose: 0.1-0.4 mg Freq: EVERY 2 MIN PRN Route: IV  PRN Reason: opioid reversal  Start: 09/27/17 7434   Admin Instructions: For respiratory rate LESS than or EQUAL to  8.  Partial reversal dose:  0.1 mg titrated q 2 minutes for Analgesia Side Effects Monitoring Sedation Level of 3 (frequently drowsy, arousable, drifts to sleep during conversation).Full reversal dose:  0.4 mg bolus for Analgesia Side Effects Monitoring Sedation Level of 4 (somnolent, minimal or no response to stimulation).               ondansetron (ZOFRAN-ODT) ODT tab 4 mg  Dose: 4 mg Freq: EVERY 8 HOURS PRN Route: PO  PRN Reasons: nausea,vomiting  Start: 09/28/17 1400   Admin Instructions: With dry hands, peel back foil backing and gently remove tablet; do not push oral disintegrating tablet through foil backing; administer immediately on tongue and oral disintegrating tablet dissolves in seconds; then swallow with saliva; liquid not required.       0914 (4 mg)-Given         2039 (4 mg)-Given        1403 (4 mg)-Given            oxyCODONE (ROXICODONE) IR tablet 10-15 mg  Dose: 10-15 mg Freq: EVERY 4 HOURS PRN Route: PO  PRN Reason: moderate to severe pain  Start: 10/01/17 1948        2216 (15 mg)-Given        0239 (15 mg)-Given       0814 (15 mg)-Given       1248 (15 mg)-Given       1701 (15 mg)-Given       2109 (15 mg)-Given        0109 (15 mg)-Given       0555 (15 mg)-Given       1017 (15 mg)-Given       1439 (15 mg)-Given       1846 (15 mg)-Given           prochlorperazine (COMPAZINE) injection 5-10 mg  Dose: 5-10 mg Freq: EVERY 6 HOURS PRN Route: IV  PRN Reasons: nausea,vomiting  Start: 09/27/17 1437              ranitidine (ZANTAC) tablet 150 mg  Dose: 150 mg Freq: 2 TIMES DAILY Route: PO  Start: 09/27/17 2000   Admin Instructions: May also be given IV     2101 (150 mg)-Given        0902 (150 mg)-Given       (2113)-Not Given       2232 (150 mg)-Given        0831 (150 mg)-Given       2236 (150 mg)-Given        0825 (150 mg)-Given       2020 (150 mg)-Given        0910 (150 mg)-Given       2032 (150 mg)-Given        0818 (150 mg)-Given       2013 (150 mg)-Given        0905 (150 mg)-Given       2016 (150  mg)-Given           senna-docusate (SENOKOT-S;PERICOLACE) 8.6-50 MG per tablet 1-2 tablet  Dose: 1-2 tablet Freq: 2 TIMES DAILY Route: PO  Start: 09/27/17 2000   Admin Instructions: Start with 1 tablet PO BID, If no bowel movement in 24 hours, increase to 2 tablets PO BID.  Hold for loose stools. Preferred agent for constipation related to opioids.     2101 (1 tablet)-Given        0902 (1 tablet)-Given       2113 (1 tablet)-Given        0831 (2 tablet)-Given       1940 (2 tablet)-Given        0825 (2 tablet)-Given       (2029)-Not Given [C]        (0912)-Not Given       2032 (2 tablet)-Given        0819 (2 tablet)-Given       (2011)-Not Given [C]        (0906)-Not Given       (2016)-Not Given           simethicone (MYLICON) chewable half-tab 120 mg  Dose: 120 mg Freq: EVERY 6 HOURS Route: PO  Start: 09/28/17 2045     2146 (120 mg)-Given        0354 (120 mg)-Given       0831 (120 mg)-Given       1434 (120 mg)-Given       2016 (120 mg)-Given        (0327)-Not Given       0405 (120 mg)-Given       1122 (120 mg)-Given       1747 (120 mg)-Given       2238 (120 mg)-Given        (0400)-Not Given       0911 (120 mg)-Given       1652 (120 mg)-Given       2216 (120 mg)-Given        0516 (120 mg)-Given       (1042)-Not Given       (1603)-Not Given       (2110)-Not Given        (0400)-Not Given       (1018)-Not Given       1632 (120 mg)-Given       [ ] 2200           sodium chloride (PF) 0.9% PF flush 3 mL  Dose: 3 mL Freq: EVERY 8 HOURS Route: IK  Start: 09/27/17 1445   Admin Instructions: And Q1H PRN, to lock peripheral IV dormant line.     (1605)-Not Given        (0121)-Not Given       (0912)-Not Given       (1745)-Not Given        0146 (3 mL)-Given       1044 (3 mL)-Given       (1810)-Not Given [C]        (0016)-Not Given       0827 (3 mL)-Given       (1622)-Not Given        (0000)-Not Given       (0912)-Not Given [C]       (2053)-Not Given        (0007)-Not Given       1047 (3 mL)-Given       1604 (3 mL)-Given         (0014)-Not Given       1018 (3 mL)-Given                  sodium chloride (PF) 0.9% PF flush 3 mL  Dose: 3 mL Freq: EVERY 1 HOUR PRN Route: IK  PRN Reason: line flush  PRN Comment: for peripheral IV flush post IV meds  Start: 09/27/17 1437     0819 (30 mL)-Given [C]         1401 (20 mL)-Given [C]        0843 (20 mL)-Given        0345 (20 mL)-Given        0648 (20 mL)-Given [C]          Discontinued Medications  Medications 09/27/17 09/28/17 09/29/17 09/30/17 10/01/17 10/02/17 10/03/17         Freq: CONTINUOUS PRN Route: XX  Start: 09/27/17 1437   End: 10/01/17 1436   Admin Instructions: NURSE to notify physician if patient has ringing in the ears, metallic taste in the mouth, or circumoral numbness         1436-Med Discontinued           Dose: 40 mg Freq: EVERY 24 HOURS Route: SC  Start: 10/02/17 1400   End: 10/01/17 0534        0534-Med Discontinued           Dose: 40 mg Freq: EVERY 24 HOURS Route: SC  Start: 09/28/17 1600   End: 10/01/17 0533     1623 (40 mg)-Given        1700 (40 mg)-Given        1648 (40 mg)-Given        0533-Med Discontinued           Dose: 1,200 mg Freq: 2 TIMES DAILY Route: PO  Start: 10/01/17 0800   End: 10/01/17 1740        0910 (1,200 mg)-Given       1740-Med Discontinued           Dose: 1,200 mg Freq: AT BEDTIME Route: PO  Start: 09/29/17 2345   End: 10/01/17 0533       (0056)-Not Given [C]       2237 (1,200 mg)-Given        0533-Med Discontinued           Dose: 900 mg Freq: DAILY Route: PO  Start: 10/01/17 1400   End: 10/01/17 1740        1416 (900 mg)-Given       1740-Med Discontinued           Dose: 900 mg Freq: 2 TIMES DAILY Route: PO  Start: 09/30/17 0800   End: 10/01/17 0533   Admin Instructions: Give AM and afternoon.        0825 (900 mg)-Given       2020 (900 mg)-Given        0533-Med Discontinued           Dose: 0.3-0.5 mg Freq: 3 TIMES DAILY Route: IV  Start: 09/30/17 1230   End: 10/01/17 0632       1234 (0.5 mg)-Given       2021 (0.5 mg)-Given        0632-Med Discontinued            Dose: 0.5 mg Freq: ONCE Route: IV  Start: 09/27/17 1730   End: 10/01/17 0632    (1735)-Not Given [C]           0632-Med Discontinued           Dose: 10-15 mg Freq: EVERY 4 HOURS PRN Route: PO  PRN Reason: moderate to severe pain  Start: 09/30/17 2145   End: 10/01/17 1813       2237 (5 mg)-Given        0146 (10 mg)-Given       0147 (5 mg)-Given [C]       0650 (15 mg)-Given       1033 (15 mg)-Given       1416 (15 mg)-Given       1813-Med Discontinued           Dose: 5-10 mg Freq: EVERY 4 HOURS PRN Route: PO  PRN Reason: moderate to severe pain  Start: 10/01/17 1813   End: 10/01/17 1948        1821 (10 mg)-Given       1948-Med Discontinued           Dose: 5-10 mg Freq: EVERY 3 HOURS PRN Route: PO  PRN Reason: moderate to severe pain  Start: 09/28/17 1251   End: 09/30/17 2133     1315 (10 mg)-Given       1621 (10 mg)-Given       1928 (10 mg)-Given       2231 (10 mg)-Given        0218 (10 mg)-Given       0522 (10 mg)-Given       0914 (10 mg)-Given       1218 (10 mg)-Given       1550 (10 mg)-Given       1845 (10 mg)-Given       2236 (10 mg)-Given        0152 (10 mg)-Given       0459 (10 mg)-Given       0824 (10 mg)-Given       1120 (10 mg)-Given       1445 (10 mg)-Given       1746 (10 mg)-Given       2120 (10 mg)-Given       2133-Med Discontinued       Medications 09/27/17 09/28/17 09/29/17 09/30/17 10/01/17 10/02/17 10/03/17               INTERAGENCY TRANSFER FORM - NOTES (H&P, Discharge Summary, Consults, Procedures, Therapies)   9/25/2017                       UNIT [address] [facility] BANK: [phone]               History & Physicals      H&P by Crista Bennett APRN CNP at 9/25/2017  1:25 PM     Author:  Crista Bennett APRN CNP Service:  Gyn/Onc Author Type:  Nurse Practitioner    Filed:  9/25/2017  6:44 PM Date of Service:  9/25/2017  1:25 PM Creation Time:  9/25/2017  1:25 PM    Status:  Attested :  Crista Bennett APRN CNP (Nurse Practitioner)    Cosigner:  Hernan Haider MD at 9/27/2017  8:34 AM         Attestation signed by Hernan Haider MD at 9/27/2017  8:34 AM        Attestation:  Patient seen and examined by me.  Agree with assessment and plan.     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  Pelvic mass - Images reviewed by me.  Will plan for XL, removal of mass on Wednesday. Continue Lovenox for prophylaxis.    Patient staffed with Dr. Cordero.    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 She notes was doing well until about 9/6/17 when she woke up with some numbness sensation to her hands and feet. She then developed sharp pain sensation shooting from her fingers to her elbow. She was more numb on her left leg compared to her right leg. A few days later she started experiencing weakness and right facial droop. She was evaluated in the [place] ER a couple of times and underwent CT of the brain, cervical and lumbar spine. She was referred to [facility] for further work up and evaluation. She was seen by medicine and neurology team.[CM1.3] MRI of brain, cervical, thoracic, and lumbar spine completed, LP with CSF studies to eval cell count, protein, glucose, stain and culture and completed a[CM1.4] 5  day[CM1.5] course of IVIG. Pelvic mass was seen on MRI so a Ct A/P was completed and revealed a left ovarian mass 6x9x10 cm and a posterior fundal fibroid measuring 4 cm. Patient notes she had not had menses over the last 4 months and she assumed it was perimenopausal. No new sexual partners, no exposure to STDs, no pelvic pain or pressure, no change in bowel or bladder function, no early satiety and no weight loss or gain.[CM1.4] She had a non-productive, chronic cough that developed about a month before the symptoms started but hasn't experience fevers, chills, night sweats, weight loss, rashes, nausea, vomiting or diarrhea.[CM1.5]              Past Medical History:[CM1.1]     Past Medical History:   Diagnosis Date     Guillain Barré syndrome Probable perineoplastic      Obesity      Pelvic mass[CM1.5]             Past Surgical History:[CM1.1]      Past Surgical History:   Procedure Laterality Date     FOOT SURGERY Right      Right knee surgery x 4       TUBAL LIGATION       Right ovarian surgery possibly cystectomy - Patient does not remember details[CM1.5]          Social History:[CM1.1]     Social History   Substance Use Topics     Smoking status: N[CM1.6]o[CM1.5]     Smokeless tobacco: No     Alcohol use No[CM1.6]     Denies alcohol, tobacco or drug use. She is originally from [place]. She is  for about 10 years. She moved to [place] with a friend to work and is planning to move back to [place] in 3 weeks.[CM1.4]          Family History:[CM1.1]     Maternal grandmother with hx of malignancy of unknown origin (Had brain and lung masses)[CM1.5]  Denies family history of breast, colon, uterine, or ovarian cancer[CM1.4]         Allergies:[CM1.1]     Allergies   Allergen Reactions     Morphine Itching[CM1.5]            Medications:[CM1.1]     Current Facility-Administered Medications   Medication     ranitidine (ZANTAC) tablet 150 mg     oxyCODONE (ROXICODONE) IR tablet 5-10 mg     calcium carbonate (TUMS) chewable  "tablet 500 mg     magnesium hydroxide (MILK OF MAGNESIA) suspension 30 mL     enoxaparin (LOVENOX) injection 40 mg     naloxone (NARCAN) injection 0.1-0.4 mg     senna-docusate (SENOKOT-S;PERICOLACE) 8.6-50 MG per tablet 1-2 tablet     polyethylene glycol (MIRALAX/GLYCOLAX) Packet 17 g     ondansetron (ZOFRAN-ODT) ODT tab 4 mg    Or     ondansetron (ZOFRAN) injection 4 mg     prochlorperazine (COMPAZINE) injection 5-10 mg    Or     prochlorperazine (COMPAZINE) tablet 5-10 mg    Or     prochlorperazine (COMPAZINE) Suppository 25 mg     gabapentin (NEURONTIN) capsule 600 mg     HYDROmorphone (PF) (DILAUDID) injection 0.5 mg[CM1.5]            Review of Systems:     CONSTITUTIONAL:[CM1.1]  No fever or chills. +Hot flashes[CM1.5]  RESPIRATORY:[CM1.1]  +Cough with occasional SOB and chest tightness that is relieved by dilaudid and sx have improved[CM1.5]  CARDIOVASCULAR:[CM1.1]  NO CP or heart palp[CM1.5]  GASTROINTESTINAL:[CM1.1] +GERD. Recent constipation s/p laxatives and LBM yesterday. No N/V, is eating and drinking well[CM1.5]  GENITOURINARY:[CM1.1]  No abnormal vaginal discharge. Hx of painful menses but no menses x 4 months. Notes some spotting over the past couple of days[CM1.5]         Physical Exam:[CM1.1]     Vitals:    09/25/17 1505 09/25/17 1736   BP: (!) 143/95    Pulse: 83    Resp: 16    Temp: 97.2  F (36.2  C)    TempSrc: Oral    SpO2: 96%    Height:  1.753 m (5' 9\")[CM1.5]     General:[CM1.1] Alert and oriented[CM1.5]  CV:[CM1.1] HR regular[CM1.5]  Resp:[CM1.1] LS clear[CM1.5]   Abdomen:[CM1.1] Soft, NT, BS+[CM1.5]  :[CM1.1] exam completed by Dr Haider. External vulva and urethra within normal limits. Speculum exam cervix without lesions small amount of blood noted to the vaginal vault[CM1.5]  Extremities:[CM1.1] No edema  Neuro exam: Alert and oriented. Speech is appropriate but slow at times. CN II - VII intact.[CM1.5]          Data:[CM1.1]     CT Cervical Spine 09/10/17: No acute fracture. " Straightening of the cervical lordosis with mild reversal centered at C4-5, mild anterolisthesis, Central disc protrusion with moderate spinal canal stenosis mild left neural forminal stenosis C6-7, Mild to moderate spinal canal stenosis mild right and mild left to moderate left neural foraminal stenosis C5-6, Mild spinal canal stenosis mild right and mild to moderate left neural foraminal stenosis C4-5, Moderate left neural foraminal stenosis C3-4.  CT head 9/10/17: No acute intracranial hemorrhage, mass or loss of gray-white differentiation  CT Lumbar spine 9/10/17: No acute fracture, disc degeneration, L5-S1 moderate to severe bilateral neural foraminal stenosis and possible impingement of the exiting L5 nerve root    Performed at [facility]:  -MRI Brain - No acute intracranial abnormality  -MRI Cervical Spine: Spinal canal stenosis at the level of C6-7 is caused by left paracentral disc herniation, cervical degenerative disc disease is most pronounced at levels of C4-5, C5-6, and C6-7. There is loss of normal cervical lordosis.  -MRI Thoracic No evidence of spinal cord compression or neural exit narrowing  -MRI Lumbar: Enhancing pelvic mass, multilevel degenerative disc disease, hypertrophic facet degenerative joint disease L5-S1 neural exit narrowing.  - CT of chest, abdomen and pelvis: Complex cystic mass of left adnexa, bilateral lower lobe pneumonia/subsegmental atelectasis, spinal stenosis at C6-7, and degenerative disc diseaes at multiple levels.  - Lumbar puncture: CSF protein elevated at 174, CSF RBC 72, 81% lymphocytes Cultures negative.   - Transvaginal U/S found 5x5x4 cm fibroid, 9x8x12 left adnexal mass and normal right ovary. Endometrial stripe 1.5 cm    -Lyme titer negative, Ca125 elevated at 119, Total protein elevated at 8.4, IgA 225[CM1.5]          Assessment and Plan:   Assessment: 47 year old female with[CM1.1] possible paraneoplastic Britni [place] syndrome transferred here d/t pelvic mass  and gyn onc care[CM1.5].     Plan:  Dz:[CM1.1] 10-12 cm left pelvic mass, 5 cm fibroid, 1.5 cm endometrial stripe. Plan for surgical removal of mass and D&C on Wednesday.[CM1.5]   FEN:[CM1.1] Regular diet. CMP and Mag. Hyponatremia at outside hospital.[CM1.5]   Pain:[CM1.1] PRN Oxycodone, dilaudid. Neurontin 600 mg TID[CM1.5]  Heme:[CM1.1] CBC pending. Lovenox 40 mg sc daily for prophylaxis[CM1.5]  CV:[CM1.1] NI[CM1.5]  Pulm:[CM1.1] Monitor negative inspiratory force/FVC every 8 hours per neurology recommendations[CM1.5]  GI:[CM1.1] Laxatives. PRN antiemetics. Zantac 150 mg PO BID. Tums PRN.[CM1.5]  :[CM1.1] Commode to void[CM1.5]  ID:[CM1.1] CBC pending. Afebrile.[CM1.5]  Endocrine:[CM1.1] NI[CM1.5]  Psych/Neuro:[CM1.1] Neurology consult, appreciate care. S/P 5 day course of IVIG with small improvement in sx. OT/PT consult[CM1.5]  PPX:[CM1.1] Lovenox PPX daily. Patient reports SCDs too painful. Encourage OOB.[CM1.5]     Crista Bennett CNP  9/25/2017 1:25 PM[CM1.1]            Revision History        User Key Date/Time User Provider Type Action    > CM1.2 9/25/2017  6:44 PM Crista Bennett APRN CNP Nurse Practitioner Sign     CM1.6 9/25/2017  6:26 PM Crista Bennett APRN CNP Nurse Practitioner      CM1.5 9/25/2017  5:59 PM Crista Bennett APRN CNP Nurse Practitioner      [N/A] 9/25/2017  3:52 PM Crista Bennett APRN CNP Nurse Practitioner Share     CM1.4 9/25/2017  3:15 PM Crista Bennett APRN CNP Nurse Practitioner Share     CM1.3 9/25/2017  3:04 PM Crista Bennett APRN CNP Nurse Practitioner Share     CM1.1 9/25/2017  1:25 PM Crista Bennett APRN CNP Nurse Practitioner Share                  Discharge Summaries     No notes of this type exist for this encounter.         Consult Notes      Consults by Dexter Mart MD at 10/2/2017  8:46 AM     Author:  Dexter Mart MD Service:  Hem/Onc Author Type:  Physician    Filed:  10/3/2017 12:03 AM Date of Service:  10/2/2017  8:46 AM  Creation Time:  10/2/2017  8:40 AM    Status:  Signed :  Dexter Mart MD (Physician)     Consult Orders:    1. Hematology & Oncology IP Consult: leukopenia; Consultant may enter orders: Yes; Patient to be seen: Routine - within 24 hours [[id number]] ordered by Lizzette Garcia MD at 10/01/17 1257                    Hematology Consult      Lynne Llanos MRN:[id number]   YOB: 1970    Date of Admission:9/25/2017             Assessment and Plan:      Assessment & Plan:   Lynne Llanos is a 47 year old female with no significant past medical history was transferred from [facility] in [facility][MS1.1] to [facility][MS1.2] for possible paraneoplastic GBS with[MS1.1] 12cm left adnexal[MS1.2] mass and elevated [MS1.1] of 119[MS1.2].  Hematology consulted[MR1.1] for further workup[MS1.1] of[MS1.2] leukopenia.[MS1.1]     #Leukopenia with otherwise unremarkable CBC (Hgb drop appropriate in setting of recent surgery)  #Neutropenia ANC 0.7  Patient noted to have WBC of 3.8 along with ANC 0.6 at OSH. Since admission at [facility], her CBC is notable for persistent leukopenia and neutropenia. She has not had frequent or recent PCP visits prior to September when she presented for evaluation of numbness and tingling. Therefore, it is unclear at this time if she has had ongoing leukopenia or if this is an acute process. Recent systemic illness could be a potential etiology of leukopenia. Differential diagnosis at this time is broad including nutritional deficiency (low B12, folate). Malignant process including leukemia, lymphoma, myelodysplastic syndromes can also present with leukopenia. However, patient does not currently have any B symptoms (fevers, chills, night sweats, weight loss) to suggest a malignant process and denies history of recurrent infections. Pending results of peripheral smear at this time to assess the necessity of obtaining a bone marrow biopsy. Infectious etiology including[MS1.2] EBV,  CMV,[MR1.1] HIV, hepatitis B and C should also be taken into consideration[MS1.2].[MR1.1]  Drug-induced neutropenia is also a possibility. However, current medication list[MS1.2] does not contain any likely offending meds.[MR1.1]     RECOMMENDATIONS:  - Pending B12 and folate levels[MS1.2] - already ordered[MS1.3]   - Pending viral infectious work-up (HIV, Hepatitis C, B)[MS1.2] - already ordered[MS1.3]   - Pending official read of peripheral smear[MS1.2] (will review today)[MS1.3]  - Please convey to primary team at OSH where patient will be discharged tomorrow the importance of following CBC to note if patient has ongoing leukopenia and consider obtaining bone marrow biopsy[MS1.2]       Celio [facility] MD  PGY1, Internal Medicine  Pager: [phone]    This patient was staffed with the attending, [MS1.1] Barron[MS1.2], who agrees with the above assessment and plan.[MS1.1]     ---------------------------------------------------------------------------------------------------------------------------------  HEMATOLOGY STAFF:    Patient seen with consult team.  Resident's note reflects our joint evaluation, assessment, and plan.  Asked to evaluate regarding leukopenia / neutropenia.  Other CBC parameters have been normal / appropriate for the clinical context.  She is unaware of any previous concerns about abnormal blood counts, but has not had regular medical care in at least the last several years.  Denies any history of infection propensity.  CBC parameters have been stable overall during this admission, and are not significantly changed from labs at outside hospital prior to transfer here.    Peripheral blood smear shows no concerning morphologic abnormalities, and nothing to suggest a primary bone marrow disease.  Flow cytometry of the peripheral blood also shows no abnormalities.    At this time, the chronicity and cause of her leukopenia / neutropenia remains unclear.  However, as CBC parameters are stable,  peripheral smear and flow cytometry show no concerning findings, a bone marrow biopsy is not urgently needed at this time.  See no reason to delay transfer back to [place] from our perspective.  Would recommend that her local physicians continue to follow CBC, and if there is no improvement along with improvement of other active medical issues, or if there is worsening of her leukopenia or development of other CBC abnormalities, bone marrow biopsy should then be considered.      Dexter Mart MD  Associate Professor of Medicine  Division of Hematology, Oncology, and Transplantation  Director, [facility]    ---------------------------------------------------------------------------------------------------------------------------------[MR1.1]    HISTORY OF PRESENT ILLNESS:[MS1.1]  Ms.[MS1.2] Lynne Llanos is a 47[MS1.1]-[MS1.2]year[MS1.1]-[MS1.2]old[MS1.1]  female with a history of right knee surgeries with neuropathic pain and depression/anxiety[MS1.2] transferred from [facility] in [facility][MS1.1] to [facility][MS1.2] for possible paraneoplastic GBS with[MS1.1] 12cm left adnexal[MS1.2] mass and elevated [MS1.1] of 119[MS1.2].  Hematology consulted[MR1.1] for further workup[MS1.1] of[MS1.2] leukopenia.[MS1.1]     On 2017, Ms. Llanos woke up with numbness of hands and feet along with shooting pain from fingers to elbows and numbness predominating left leg moreso than the right. Over the course of the next couple days, she developed right facial droop along with worsening lower extremity weakness for which she visited [facility] where she underwent CT brain, cervical and lumbar spine and subsequently referred to [facility] for further evaluation. MRI lumbar at [facility] revealed an enhancing pelvic mass and tumor markers[MS1.2] notable for elevated  of 119. She was subsequently transferred to [facility] for staging of pelvic mass.  here was elevated at 61, CA 19-9  elevated at 41. However, inhibin B, AFP, CEA, HCG tumor levels were unremarkable. Preliminary surgical pathology of pelvic mass and cytology was negative for malignancy, making concerns for neoplastic GBM unlikely and paraneoplastic panel and ganglioside Ab panel is unlikely to  per neurology.     Ms. Llanos was also noted to have WBC of 3.8 at OSH and 3.2 on admission to [facility]. Her ANC at OSH was 0.6 and has remained low at this time. Peripheral smear and flow cytometry have been obtained. Patient denies any fevers, chills, night sweats, unintentional weight loss, changes in appetite, history of recurrent infections at this time. Moreover, she states that she has not been told by her provider that she has a low WBC and has not visited a PCP frequently until most recently when she began having neurological symptoms.[MS1.4]     PAST MEDICAL HISTORY:    Past Medical History:   Diagnosis Date     Guillain Barré syndrome Probable perineoplastic      Obesity      Pelvic mass        Past Surgical History:   Procedure Laterality Date     CYSTOSCOPY N/A 9/27/2017    Procedure: CYSTOSCOPY;;  Surgeon: Shahana Cordero MD;  Location: [facility] OR     FOOT SURGERY Right      HYSTERECTOMY TOTAL ABDOMINAL, BILATERAL SALPINGO-OOPHORECTOMY, COMBINED Bilateral 9/27/2017    Procedure: COMBINED HYSTERECTOMY TOTAL ABDOMINAL, SALPINGO-OOPHORECTOMY;;  Surgeon: Shahana Cordero MD;  Location: [facility] OR     LAPAROTOMY EXPLORATORY N/A 9/27/2017    Procedure: LAPAROTOMY EXPLORATORY;  laparotomy exploratory,modified radical hysterectomy Bilateral Salpingo-Oophrectomy, cystoscopy, proctoscopy,pelvic washings, bilateral ureterolysis. ;  Surgeon: Shahana Cordero MD;  Location: [facility] OR     PROCTOSCOPY N/A 9/27/2017    Procedure: PROCTOSCOPY;;  Surgeon: Shahana Cordero MD;  Location: [facility] OR     Right knee surgery x 4       TUBAL LIGATION          MEDICATIONS:  PTA Meds  Prior to Admission medications    Medication Sig Last Dose Taking? Auth  Provider   HYDROmorphone, STANDARD CONC, (DILAUDID) 1 MG/ML LIQD liquid Inject 1 mg into the vein every 2 hours as needed for moderate to severe pain 9/25/2017 at 1453 Yes Reported, Patient   GABAPENTIN PO Take 900 mg by mouth 2 times daily 9/24/2017 at 2100 Yes Reported, Patient   RaNITidine HCl (ZANTAC PO) Take 150 mg by mouth 2 times daily 9/24/2017 at 2100 Yes Reported, Patient   magnesium hydroxide (MILK OF MAGNESIA) 400 MG/5ML suspension Take 30 mLs by mouth 2 times daily 9/24/2017 at 2100 Yes Reported, Patient   senna-docusate (SENOKOT-S;PERICOLACE) 8.6-50 MG per tablet Take 2 tablets by mouth 2 times daily 9/24/2017 at 2100 Yes Reported, Patient   enoxaparin (LOVENOX) 40 MG/0.4ML injection Inject 40 mg Subcutaneous daily 9/24/2017 at 2100 Yes Reported, Patient   hydrocortisone 2.5 % cream Apply topically 2 times daily 9/24/2017 at 2100 Yes Reported, Patient      Current Meds    enoxaparin  40 mg Subcutaneous Q24H     carboxymethylcellulose  1 drop Both Eyes BID     gabapentin  1,200 mg Oral TID     ketamine (KETALAR) 5%-gabapentin (NEURONTIN) 8%-lidocaine 2.5%   Topical TID     DULoxetine  30 mg Oral Daily     menthol   Transdermal Q8H     simethicone  120 mg Oral Q6H     sodium chloride (PF)  3 mL Intracatheter Q8H     senna-docusate  1-2 tablet Oral BID     bisacodyl  10 mg Rectal Daily     ibuprofen  600 mg Oral Q6H     ranitidine  150 mg Oral BID     metroNIDAZOLE  500 mg Oral Q8H LEIDY     ciprofloxacin  500 mg Oral Q12H LEIDY     Infusion Meds       ALLERGIES:    Allergies   Allergen Reactions     Morphine Itching       REVIEW OF SYSTEMS:  A 10 point review of systems was negative except as noted above.    SOCIAL HISTORY:   Social History     Social History     Marital status: Single     Spouse name: N/A     Number of children: N/A     Years of education: N/A     Occupational History     Not on file.     Social History Main Topics     Smoking status: Not on file     Smokeless tobacco: Not on file      "Alcohol use Not on file     Drug use: Not on file     Sexual activity: Not on file     Other Topics Concern     Not on file     Social History Narrative       FAMILY MEDICAL HISTORY:   History reviewed. No pertinent family history.    PHYSICAL EXAM:   /65 (BP Location: Left arm)  Pulse 81  Temp 97.7  F (36.5  C) (Oral)  Resp 16  Ht 1.753 m (5' 9\")  Wt 100.1 kg (220 lb 11.2 oz)  SpO2 95%  BMI 32.59 kg/m2         General: alert and[MS1.1] in mild[MS1.4] distress[MS1.1] from pain[MS1.4]  Head: NC/AT  Eyes: non-icteric sclera, EOMI[MS1.1]  Lymph:  No palpable adenopathy[MR1.1]  Pulmonary: CTAB  CV: RRR,[MS1.1] did not appreciate any[MS1.4] murmurs,[MS1.1] rubs or gallops[MS1.4]  GI: soft,[MS1.1] tender around site of incision which is clean, dry, intact around bandages[MS1.4], non-distended + bowel sounds[MS1.1].  No HSM.[MR1.1]  MSK:[MS1.1] no paraspinal or CVA tenderness[MS1.4]   EXT: no edema, WWP  Neuro:[MS1.1] awake and alert[MS1.4],[MS1.1] CN II-VI, VIII-XII intact. Facial droop on right side. Sensation to light touch in BLE and BUE intact. Strength 5/5 in BLE and BUE.[MS1.4]     LABS:   CMP  Recent Labs  Lab 09/30/17  1415 09/28/17  0826 09/26/17  0322 09/25/17  1756    138 135 135   POTASSIUM 4.4 3.7 4.1 4.3   CHLORIDE 106 105 101 102   CO2 27 25 28 25   ANIONGAP 6 7 6 8   GLC 89 112* 96 83   BUN 8 9 12 12   CR 0.67 0.75 0.64 0.66   GFRESTIMATED >90 83 >90 >90   GFRESTBLACK >90 >90 >90 >90   BONNIE 8.8 8.3* 9.3 9.4   MAG 2.3  --   --  2.3   PHOS 3.8  --   --   --    PROTTOTAL  --  6.0*  --  8.8   ALBUMIN  --  2.1*  --  3.0*   BILITOTAL  --  0.3  --  0.3   ALKPHOS  --  41  --  60   AST  --  21  --  45   ALT  --  21  --  48     CBC  Recent Labs  Lab 10/02/17  0343 10/01/17  1435 10/01/17  0846 09/30/17  1415   HGB 9.5* 10.4* 9.6* 9.6*   WBC 2.5* 2.6* 1.9* 1.6*   RBC 3.54* 3.80 3.59* 3.55*   HCT 31.3* 33.0* 31.1* 30.8*   MCV 88 87 87 87   MCH 26.8 27.4 26.7 27.0   MCHC 30.4* 31.5 30.9* 31.2*   RDW " 18.6* 18.8* 18.8* 18.6*    294 249 217       URINE STUDIES  Recent Labs   Lab Test  09/29/17   0810   COLOR  Yellow   APPEARANCE  Slightly Cloudy   URINEGLC  Negative   URINEBILI  Negative   URINEKETONE  Negative   SG  1.009   UBLD  Moderate*   URINEPH  7.5*   PROTEIN  Negative   NITRITE  Negative   LEUKEST  Trace*   RBCU  4*   WBCU  2     - Lumbar puncture at OSH: CSF protein elevated at 174, CSF RBC 72, 81% lymphocytes Cultures negative.   - : 119 at OSH.     Labs obtained on 9/26:  - : 61   - CA 19-9: 41 (H)   - HCG tumor: <3   - AFP: 2.1   - Inhibin B: 16  - LDH: 206     IMAGING:  CT Cervical Spine 09/10/17: No acute fracture. Straightening of the cervical lordosis with mild reversal centered at C4-5, mild anterolisthesis, Central disc protrusion with moderate spinal canal stenosis mild left neural forminal stenosis C6-7, Mild to moderate spinal canal stenosis mild right and mild left to moderate left neural foraminal stenosis C5-6, Mild spinal canal stenosis mild right and mild to moderate left neural foraminal stenosis C4-5, Moderate left neural foraminal stenosis C3-4.  CT head 9/10/17: No acute intracranial hemorrhage, mass or loss of gray-white differentiation  CT Lumbar spine 9/10/17: No acute fracture, disc degeneration, L5-S1 moderate to severe bilateral neural foraminal stenosis and possible impingement of the exiting L5 nerve root     Performed at [facility]:  - MRI Brain - No acute intracranial abnormality  - MRI Cervical Spine: Spinal canal stenosis at the level of C6-7 is caused by left paracentral disc herniation, cervical degenerative disc disease is most pronounced at levels of C4-5, C5-6, and C6-7. There is loss of normal cervical lordosis.  - MRI Thoracic No evidence of spinal cord compression or neural exit narrowing  - MRI Lumbar: Enhancing pelvic mass, multilevel degenerative disc disease, hypertrophic facet degenerative joint disease L5-S1 neural exit narrowing.  - CT  of chest, abdomen and pelvis: Complex cystic mass of left adnexa, bilateral lower lobe pneumonia/subsegmental atelectasis, spinal stenosis at C6-7, and degenerative disc diseaes at multiple levels.  - Transvaginal U/S found 5x5x4 cm fibroid, 9x8x12 left adnexal mass and normal right ovary. Endometrial stripe 1.5 cm  [MS1.1]           Revision History        User Key Date/Time User Provider Type Action    > MR1.1 10/3/2017 12:03 AM Dexter Mart MD Physician Sign     MS1.3 10/2/2017  1:51 PM Celio Holden MD Resident Sign     MS1.4 10/2/2017  1:24 PM Celio Holden MD Resident      MS1.2 10/2/2017 12:46 PM Celio Holden MD Resident      MS1.1 10/2/2017  8:40 AM Celio Holden MD Resident             Consults by Carlos Akhtar CNP at 9/29/2017 10:32 AM     Author:  Carlos Akhtar CNP Service:  Pain Service Author Type:  Nurse Practitioner    Filed:  10/2/2017 10:04 AM Date of Service:  9/29/2017 10:32 AM Creation Time:  9/29/2017 10:31 AM    Status:  Addendum :  Carlos Akhtar CNP (Nurse Practitioner)     Consult Orders:    1. Pain Management Adult IP Consult: Patient to be seen: Routine - within 24 hours; Call back #: [phone]; pain; Consultant may enter orders: Yes [[id number]] ordered by Crista Bennett APRN CNP at 09/29/17 0727                Inpatient Pain Management Service: Consultation      DATE OF CONSULT:[BW1.1] September 29, 2017[BW1.2]      REASON FOR PAIN CONSULTATION:  Lynne Llanos is a 47 year old female I am seeing in consultation at the request of Crista Bennett CNP  for evaluation and recommendations for her acute postoperative pain.       CHIEF PAIN COMPLAINT: abdominal pain, and bilateral feet and hand pain      ASSESSMENT: KELLY from [facility] ([place], [place]) where she was admitted for Guillan [place] Syndrome (felt secondary to possible paraneoplastic syndrome and left adnexal complex, cystic mass) s/p IVIg x 5d (9/18) found to have 10  cm left adnexal complex cystic mass w/ elevated  (119). She was transferred for surgical management of left adnexal mass w/ concern for possible CA.  1. Acute post-operative pain status post modified radical hysterectomy with bilateral saplingo-oopherectomy, cystoscopy, pelvic washings and bilateral ureterolysis, for treatment of left complex cystic mass of left adnexa on 09/27/17. Patient struggling with postoperative pain control, reports that she did well on PCA postoperatively and felt that she was transitioned over to orals too abruptly. Patient transitioned to oral opioids yesterday, and IV hydromorphone nurse administered doses started last night. Patient reports tolerable pain at this time and reports he pain is much improved since last night.   2. Need to consider functionality along with subjective pain report when assessing patient's pain.  3. 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  Patient reports bilateral hand and foot pain that started about 3 weeks ago outpatient. Patient was on gabapentin for treatment of her chronic right knee pain prior to Guillian-[place] symptoms. Patient transitioned to pregabalin trial outpatient with no benefit and was restarted on her gabapentin by her PCP.  Need to maximize neuromodulating medications for treatment of Guillian-[place] associated pain as opioids are not first line therapy for neuropathic pain.   4. Chronic left knee pain with history of 4 knee surgeries,  including total knee arthroplasty.   5. Chronic low back pain, patient report a history of osteoarthritis of her low back, with chronic low back pain.  6. Depression/Anxiety, Patient reports a history of hospital admissions x2 for uncontrolled anxiety and depression while going through her divorce. She reports stable depression and anxiety prior to this admission. She denies being on any Psychiatric  "medications for management of her depression and anxiety prior to admission. She reports she feels her anxiety and depression are currently well controlled, but she did become emotional and cried x 2 during our visit today while discussing her current medical issues, and reports feeling a bit overwhelmed by all these medical issues, and reports she was a relatively healthy individual prior to this last month.   7. History of remote substance use, she reports she doesn't know exactly what it was but reports it was called \"Crank\"[BW1.1]  (possibly methamphetamine)[BW1.3] 20 years ago. She reports she used for 3 months then quit. She denies undergoing chemical dependency treatment, and reports she had to \"choose it or her \" at the time, and reports she chose her . She does make note that her  is now her ex- and he would always reference this period of drug use and accuse her of being on drugs. Denies any other illicit drug use.   8. BMI of 32, denies any CHILANGO diagnosis possibly undiagnosed CHILANGO, put patient at increased risk of respiratory depression with opioids.   9. Essentially opioid naive prior to admission. Patient denies any chronic opioid use prior to admission to OS and transfer to [facility] on 09/25/17. Patient is tolerating opioid tolerate doses postoperatively during this admission.  [place] and [place]  negative for any recent chronic opioids. See details below regarding . Patient only received one prescription for oxycodone-acetaminophen from May 2017 until 09/12/17 as outlined below.    Outpatient medications related to pain prior to admission:   --09/12/17 oxycodone-acetaminophen 5-325mg PO #10 for a 2 day supply.   -09/10/17-carisoprodol 350 mg #10 for a 4 day supply   --no other opioids received since May of 2017,   --a few prescriptions from April-May 2017 for tramadol and hydrocodone-acetaminophen.     Outpatient opioids prescribed by: not receiving chronic opioids " prior to admission  PRIMARY CARE PROVIDER: No Ref-Primary, Physician  PAIN SPECIALIST: NONE    [facility] database reviewed      TREATMENT RECOMMENDATIONS/PLAN:   1. Continue oxycodone 5-10 mg PO Q 3 hours PRN[BW1.1]    Addendum:[BW1.4] as oxycodone was increased over the weekend to oxycodone 10-15 mg PO Q 4 hours PRN.[BW1.5]     Upon Discharge prescribe oxycodone 5 mg tablets for #30 tablets. Patient can take 10-15 mg PO Q 4 hours PRN x 24 hours then, change to oxycodone 5-10 mg PO Q 4 hours PRN,  taper off over the next 1-2 days by spacing out the time between doses. [BW1.4] For treatment of her acute postoperative abdominal pain.     Opioids are not intended for treatment of her Guillian-Burt bilateral foot and hand pain, Need to maximize neuromodulating medications for treatment of neuropathic pain as opioids are not first-line therapy for neuropathic pain.   2. Continue hydromorphone 0.3-0.5mg IV Q 2 hours x 24 hours    Tomorrow change to hydromorphone 0.3-0.5mg IV TID PRN (maximum of 3 doses/24 hours)    Leo 10/01/17 Discontinue IV hydromorphone.   3. Change acetaminophen to 1,000mg PO Q 8 hours scheduled. As LFTs are improving.[BW1.1]     C[BW1.3]ontinue to monitor LFTs.     Change to PRN upon discharge.[BW1.1]     OF NOTE: patient is declining doses due to a history of her friend having an overdose of acetaminophen, and she is concerned about the risk of unintentional acetaminophen overdose.     Would recommend maximizing acetaminophen if the patient is amenable.[BW1.6]   4. C[BW1.3]hange gabapentin to 900mg, 900mg, 1200mg, PO Q 8 hours x 2 days     Then if tolerating continue to titrate by 300mg total daily dose every 2 days.     (maximum recommended total daily dose for neuropathic pain is 3600mg/day)  5. Start methocarbamol 500mg PO Q 6 hours PRN for abdominal[BW1.1] wall[BW1.3] muscle spasms. Patient has, abdominal myofascial evolvement   6. Discontinue ibuprofen for colon[BW1.1]e[BW1.3] to ketorolac,  restart once course of ketorolac is finished.   7. Start ketorolac 15 mg IV Q 6 hours scheduled x 24-48 hours then re-initiate ibuprofen 600mg PO Q 6hours scheduled.   8. Start lidocaine[BW1.1] 2.[BW1.7]5% gabapentin 8% PLO gel, TD Q[BW1.1] 8[BW1.7] hours, apply to bilateral feet[BW1.1], not to exceed 10 % body surface area.[BW1.7]   9. Avoid any local anesthetic to the abdomen/area of rectus sheath block, for at least 96 hours after Exparel block was given, (Exparel given on 09/27 @ 12:26PM.)  10. Start menthol patch, 1 patch, TD Q 8 hours apply around incision not over incision.   11. Aggressive Bowel regimen for opioid induced constipation per primary team.   12. Patient would benefit from health psychology consult for coping, however, patient declined, this would be beneficial as the patient had labile emotions during our visit today surrounding her recent diagnosis of Guillian-[place].    13. IF anxiety/depression becomes uncontrolled consult psychiatry.[BW1.1]   14. Addendum: Pain Service Will sign off at this time.[BW1.4]         ASSESSMENT AND RECOMMENDATIONS DISCUSSED WITH:[BW1.1] Crista Bennett CNP[BW1.6], plan discussed with Vanessa Og MD from the pain service.       Thank you for consulting the Inpatient Pain Management Service.   The above recommendations are to be acted upon at the primary team s discretion.    To reach us:  Mon - Friday 8 AM - 3 PM: Pager [phone]   After hours, weekends and holidays: Primary service should call [phone] for the on-call pain specialist    HISTORY OF PRESENT ILLNESS: Lynne Llanos is a 47 year old female with history of[BW1.1] depression/anxiety, BMI of 32, remote substance use,[BW1.3] KELLY from [facility] ([place], [place]) where she was admitted for Guillan [place] Syndrome (felt secondary to 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  "She was transferred for surgical management of left adnexal mass w/ concern for possible[BW1.1] cancer. Patient underwent[BW1.3] modified radical hysterectomy with bilateral saplingo-oopherectomy, cystoscopy, pelvic washings and bilateral ureterolysis, for treatment of left complex cystic mass of left adnexa on 09/27/17.[BW1.1] Patient struggling with pain control postoperatively, initially stated on oxycodone 5-10 mg PO Q 4 hours PRN, and then due to uncontrolled pain was changed to a hydromorphone PCA with 0.2-0.3mg Q 10 minute lockout with no continuous rate. Yesterday  09/28 patient transitioned off PCA over to orals with reports of uncontrolled pain, hydromorphone IV nurse administered doses started last night.     Today patient is resting in bed, appears comfortable, no acute distress. She reports her pain is tolerable with discomfort this morning after changes to opioid regimen last night. She reports her pain is located in her abdomen, bilateral feet and hands. She reports the pain in her abdomen is sharp, constant, and she reports the pain in her hand and feet is difficult to describe, and states is it \"pain and numbness\". She reports the combination of the IV Dilaudid and oxycodone is helping her pain. She reports her abdominal pain is worse with activity. She reports her pain in her feet is worse with ambulating. She is up ambulating with physical therapy, but reports it is difficult. She reports poor sleep last night due to pain and reports sleeping 2 hours total. She is tolerating a regular diet and reports her last bowel movement was about 3 days ago. She denies shortness of breath or chest pain. She admits to baseline numbness and weakness in her bilateral hands and feet, that has not changed per her baseline prior to admission.  She denies every following with a chronic pain clinic for her chronic knee pain, but does reports being on opioids, (hydrocodne-acetaminpohen) for a while after her right " "total knee arthroplasty. She reports tapering off due to she didn't want to be on chronic pain medications. She denies any chronic opioid use prior to her admission to hospital in [place], [place].    She report a remote history of[BW1.3] hospital admissions x2 for uncontrolled anxiety and depression while going through her divorce. She reports stable depression and anxiety prior to this admission. She denies being on any Psychiatric medications for management of her depression and anxiety prior to admission. She reports she feels her anxiety and depression are currently well controlled, but she did become emotional and cried x 2 during our visit today while discussing her current medical issues, and reports feeling a bit overwhelmed by all these medical issues, and reports she was a relatively healthy individual prior to this last month.    she reports[BW1.1] a remote history of substance use, \"20 years ago\".[BW1.3] she doesn't know exactly what it was but reports it was called \"Crank\"[BW1.1] (possibly methamphetamine)[BW1.3]. She reports she used for 3 months then quit. She denies undergoing chemical dependency treatment, and reports she had to \"choose it or her \" at the time, and reports she chose her . She does make note that her  is now her ex- and he would always reference this period of drug use and accuse her of being on drugs. Denies any other illicit drug use.        PAIN HISTORY:   Location:[BW1.1] abdomen, bilateral feet and hands[BW1.3]  Duration:[BW1.1] constant[BW1.3]  Quality of the pain:[BW1.1] sharp, constatnt[BW1.3]  Aggravating factors:[BW1.1] activity, walking, sitting up[BW1.3]   Relieving factors :[BW1.1] IV Dilaudid and oxycodone[BW1.3]    CAPA (Clinically Aligned Pain Assessment)  Comfort (How is your pain?):[BW1.1] Tolerable with discomfort[BW1.3]  Change in Pain (Since your last medication/intervention?):[BW1.1] Getting better[BW1.3]  Pain Control (How are your pain " treatments working?):[BW1.1] Partially effective pain control[BW1.3]  Functioning (Are you able to do activities to get better?) :[BW1.1] Can do most things, but pain gets in the way of some[BW1.3]   Sleep (Does your pain management allow you to sleep or rest?):[BW1.1] Awake with occasional pain[BW1.3]       FUNCTIONAL STATUS:  Change:[BW1.1]      improving[BW1.3]  Oral intake:[BW1.1]     Regular[BW1.3]  Bowel function:[BW1.1]    Normal[BW1.3]  Activity level:[BW1.1]     Up with assistance ambulating in room  Ambulates in room[BW1.3]   Sleep:[BW1.1]      Reports 2 hours last night[BW1.3]  Mood:[BW1.1]      depressed affect, anxiety, labile and having problems adjusting[BW1.3]      REVIEW OF 10 BODY SYSTEMS: 10 point ROS of systems including Constitutional, Eyes, Respiratory, Cardiovascular, Gastroenterology, Genitourinary, Integumentary, Musculoskeletal, Psychiatric were all negative except for pertinent positives noted in my HPI.       INPATIENT MEDICATIONS PERTINENT TO PAIN CONSULT:   Medications related to Pain Management (Future)    Start     Dose/Rate Route Frequency Ordered Stop    09/29/17 0900  DULoxetine (CYMBALTA) EC capsule 30 mg      30 mg Oral DAILY 09/29/17 0812      09/29/17 0001  magnesium hydroxide (MILK OF MAGNESIA) suspension 30 mL      30 mL Oral DAILY PRN 09/29/17 0001      09/28/17 2045  simethicone (MYLICON) chewable half-tab 120 mg      120 mg Oral EVERY 6 HOURS 09/28/17 2030 09/28/17 2025  HYDROmorphone (PF) (DILAUDID) injection 0.3-0.5 mg      0.3-0.5 mg Intravenous EVERY 2 HOURS PRN 09/28/17 2030 09/28/17 1251  oxyCODONE (ROXICODONE) IR tablet 5-10 mg      5-10 mg Oral EVERY 3 HOURS PRN 09/28/17 1251      09/27/17 2000  senna-docusate (SENOKOT-S;PERICOLACE) 8.6-50 MG per tablet 1-2 tablet      1-2 tablet Oral 2 TIMES DAILY 09/27/17 1437      09/27/17 1730  HYDROmorphone (PF) (DILAUDID) injection 0.5 mg      0.5 mg Intravenous ONCE 09/27/17 1724      09/27/17 1445  acetaminophen  "(TYLENOL) tablet 650 mg      650 mg Oral EVERY 6 HOURS 09/27/17 1437      09/27/17 1445  bisacodyl (DULCOLAX) Suppository 10 mg      10 mg Rectal DAILY 09/27/17 1437      09/27/17 1445  ibuprofen (ADVIL/MOTRIN) tablet 600 mg      600 mg Oral EVERY 6 HOURS 09/27/17 1437      09/27/17 1437  lidocaine 1 % 1 mL      1 mL Other EVERY 1 HOUR PRN 09/27/17 1437      09/27/17 1437  lidocaine (LMX4) kit       Topical EVERY 1 HOUR PRN 09/27/17 1437      09/27/17 1437  bupivacaine liposome (EXPAREL) LONG ACTING injection was administered into the infiltration site to produce postsurgical analgesia. Duration of action is up to 72 hours, and other \"akhil\" medications should not be given for 96 hours with the exception of the lidocaine 5% patch (LIDODERM) and the lidocaine 10mg in potassium infusions. This entry is for INFORMATION ONLY.       Does not apply CONTINUOUS PRN 09/27/17 1437 10/01/17 1436    09/26/17 1400  gabapentin (NEURONTIN) capsule 900 mg      900 mg Oral 3 TIMES DAILY 09/26/17 1238            PAST PAIN TREATMENTS:[BW1.1]   --pregabalin- no help  --gabapentin-helps  --hydrocodone-acetaminophen- helps  --carisoprodol- no help[BW1.3]      CURRENT MEDICATIONS:[BW1.1]   Current Facility-Administered Medications Ordered in Epic   Medication Dose Route Frequency Provider Last Rate Last Dose     magnesium hydroxide (MILK OF MAGNESIA) suspension 30 mL  30 mL Oral Daily PRN Viky Ott MD   30 mL at 09/29/17 0015     DULoxetine (CYMBALTA) EC capsule 30 mg  30 mg Oral Daily Mary Zarco MD         enoxaparin (LOVENOX) injection 40 mg  40 mg Subcutaneous Q24H Mary Zarco MD   40 mg at 09/28/17 1623     oxyCODONE (ROXICODONE) IR tablet 5-10 mg  5-10 mg Oral Q3H PRN Crista Bennett APRN CNP   10 mg at 09/29/17 0914     carboxymethylcellulose (REFRESH PLUS) 0.5 % ophthalmic solution 1 drop  1 drop Both Eyes TID PRN Mary Zarco MD   1 drop at 09/29/17 0914     simethicone (MYLICON) chewable " "half-tab 120 mg  120 mg Oral Q6H Viky Ott MD   120 mg at 09/29/17 0831     HYDROmorphone (PF) (DILAUDID) injection 0.3-0.5 mg  0.3-0.5 mg Intravenous Q2H PRN Viky Ott MD   0.5 mg at 09/29/17 0831     bupivacaine liposome (EXPAREL) LONG ACTING injection was administered into the infiltration site to produce postsurgical analgesia. Duration of action is up to 72 hours, and other \"akhil\" medications should not be given for 96 hours with the exception of the lidocaine 5% patch (LIDODERM) and the lidocaine 10mg in potassium infusions. This entry is for INFORMATION ONLY.   Does not apply Continuous PRN Kenney Long MD         lidocaine 1 % 1 mL  1 mL Other Q1H PRN Shahana Ospina MD         lidocaine (LMX4) kit   Topical Q1H PRN Shahana Ospina MD         sodium chloride (PF) 0.9% PF flush 3 mL  3 mL Intracatheter Q1H PRN Shahana Ospina MD   30 mL at 09/28/17 0819     sodium chloride (PF) 0.9% PF flush 3 mL  3 mL Intracatheter Q8H Shahana Ospina MD   3 mL at 09/29/17 0146     acetaminophen (TYLENOL) tablet 650 mg  650 mg Oral Q6H Shahana Ospina MD         senna-docusate (SENOKOT-S;PERICOLACE) 8.6-50 MG per tablet 1-2 tablet  1-2 tablet Oral BID Shahana Ospina MD   2 tablet at 09/29/17 0831     bisacodyl (DULCOLAX) Suppository 10 mg  10 mg Rectal Daily Shahana Ospina MD   10 mg at 09/29/17 0831     benzocaine-menthol (CEPACOL) 15-3.6 MG lozenge 1-2 lozenge  1-2 lozenge Buccal Q1H PRN Shahana Ospina MD         ibuprofen (ADVIL/MOTRIN) tablet 600 mg  600 mg Oral Q6H Shahana Ospina MD   600 mg at 09/29/17 0618     ondansetron (ZOFRAN-ODT) ODT tab 4 mg  4 mg Oral Q8H PRN Shahana Ospina MD   4 mg at 09/29/17 0914     prochlorperazine (COMPAZINE) injection 5-10 mg  5-10 mg Intravenous Q6H PRN Shahana Ospina MD         ranitidine (ZANTAC) tablet 150 mg  150 mg Oral BID Shahana Ospina MD   150 mg at 09/29/17 0831     metroNIDAZOLE (FLAGYL) tablet 500 mg  500 mg Oral Q8H [facility] Shahana Ospina MD   500 mg at " 09/29/17 0831     ciprofloxacin (CIPRO) tablet 500 mg  500 mg Oral Q12H [facility] Shahana Ospina MD   500 mg at 09/29/17 0831     naloxone (NARCAN) injection 0.1-0.4 mg  0.1-0.4 mg Intravenous Q2 Min PRN Shahana Ospina MD         HYDROmorphone (PF) (DILAUDID) injection 0.5 mg  0.5 mg Intravenous Once Shahana Ospina MD         gabapentin (NEURONTIN) capsule 900 mg  900 mg Oral TID Mary Zarco MD   900 mg at 09/29/17 0830     No current Epic-ordered outpatient prescriptions on file.[BW1.2]           HOME/PREVIOUS MEDICATIONS:   Prior to Admission medications    Medication Sig Start Date End Date Taking? Authorizing Provider   HYDROmorphone, STANDARD CONC, (DILAUDID) 1 MG/ML LIQD liquid Inject 1 mg into the vein every 2 hours as needed for moderate to severe pain   Yes Reported, Patient   GABAPENTIN PO Take 900 mg by mouth 2 times daily   Yes Reported, Patient   RaNITidine HCl (ZANTAC PO) Take 150 mg by mouth 2 times daily   Yes Reported, Patient   magnesium hydroxide (MILK OF MAGNESIA) 400 MG/5ML suspension Take 30 mLs by mouth 2 times daily   Yes Reported, Patient   senna-docusate (SENOKOT-S;PERICOLACE) 8.6-50 MG per tablet Take 2 tablets by mouth 2 times daily   Yes Reported, Patient   enoxaparin (LOVENOX) 40 MG/0.4ML injection Inject 40 mg Subcutaneous daily   Yes Reported, Patient   hydrocortisone 2.5 % cream Apply topically 2 times daily   Yes Reported, Patient         ALLERGIES:[BW1.1]    Allergies   Allergen Reactions     Morphine Itching[BW1.2]            PAST MEDICAL AND PSYCHIATRIC HISTORY:[BW1.1]    Past Medical History:   Diagnosis Date     Guillain Barré syndrome Probable perineoplastic      Obesity      Pelvic mass[BW1.2]            PAST SURGICAL HISTORY:[BW1.1]   Past Surgical History:   Procedure Laterality Date     CYSTOSCOPY N/A 9/27/2017    Procedure: CYSTOSCOPY;;  Surgeon: Shahana Cordero MD;  Location: [facility] OR     FOOT SURGERY Right      HYSTERECTOMY TOTAL ABDOMINAL, BILATERAL  SALPINGO-OOPHORECTOMY, COMBINED Bilateral 9/27/2017    Procedure: COMBINED HYSTERECTOMY TOTAL ABDOMINAL, SALPINGO-OOPHORECTOMY;;  Surgeon: Shahana Cordero MD;  Location: [facility] OR     LAPAROTOMY EXPLORATORY N/A 9/27/2017    Procedure: LAPAROTOMY EXPLORATORY;  laparotomy exploratory,modified radical hysterectomy Bilateral Salpingo-Oophrectomy, cystoscopy, proctoscopy,pelvic washings, bilateral ureterolysis. ;  Surgeon: Shahana Cordero MD;  Location: [facility] OR     PROCTOSCOPY N/A 9/27/2017    Procedure: PROCTOSCOPY;;  Surgeon: Shahana Cordero MD;  Location: [facility] OR     Right knee surgery x 4       TUBAL LIGATION[BW1.2]             FAMILY HISTORY:[BW1.1] family history is not on file.[BW1.2]      HEALTH & LIFESTYLE PRACTICES:   Tobacco:[BW1.1]  has no tobacco history on file.[BW1.2]  Alcohol:[BW1.1]  has no alcohol history on file.[BW1.2]  Illicit drugs:[BW1.1]  has no drug history on file.[BW1.2]       SOCIAL HISTORY:[BW1.1] Lives in [place], [place][BW1.3]      LABORATORY VALUES:   Last Basic Metabolic Panel:  Lab Results   Component Value Date     09/28/2017      Lab Results   Component Value Date    POTASSIUM 3.7 09/28/2017     Lab Results   Component Value Date    CHLORIDE 105 09/28/2017     Lab Results   Component Value Date    BONNIE 8.3 09/28/2017     Lab Results   Component Value Date    CO2 25 09/28/2017     Lab Results   Component Value Date    BUN 9 09/28/2017     Lab Results   Component Value Date    CR 0.75 09/28/2017     Lab Results   Component Value Date     09/28/2017       CBC results:  Lab Results   Component Value Date    WBC 3.9 09/28/2017     Lab Results   Component Value Date    HGB 9.0 09/28/2017     Lab Results   Component Value Date    HCT 28.7 09/28/2017     Lab Results   Component Value Date     09/28/2017       LFT results:  Lab Results   Component Value Date    AST 21 09/28/2017     Lab Results   Component Value Date    ALT 21 09/28/2017     Lab Results   Component Value Date     ALKPHOS 41 09/28/2017         Lab Results   Component Value Date    ALBUMIN 2.1 09/28/2017         DIAGNOSTIC TESTS:[BW1.1]   Surgical Pathology:   INTRAOPERATIVE DIAGNOSIS   PRELIMINARY INTRAOPERATIVE FROZEN SECTION DIAGNOSIS:   A. Uterus, cervix, bilateral tubes and ovaries:   - Frozens from left ovary (2X), presumed right ovary and myometrium:   negative for malignancy.[BW1.3]     Labs above reviewed as well as additional relevant diagnostic studies from the EPIC record.       PHYSICAL EXAMINATION:  VITAL SIGNS:  B/P: 118/62, T: 98.1, P: 90, R: 16    CONSTITUTIONAL/GENERAL APPEARANCE:[BW1.1] Alert and Oriented x3 and no acute distress[BW1.3]  NECK:[BW1.1] free range of motion[BW1.3]  ENT:[BW1.1] moist mucous membranes[BW1.3]  EYES:[BW1.1] PERRLA and Pupils 4mm[BW1.3]  PULMONARY:[BW1.1]non-labored, clear to auscultation and regular respiratory rate[BW1.3]  CHEST WALL:[BW1.1]symetrical chest wall expansion[BW1.3]  CARDIOVASCULAR:[BW1.1]Regular rate and rhythm, No peripheral edema, acyanotic and peripheral pulses +2 all extremities[BW1.3]  ABDOMINAL:[BW1.1]bowel sounds positive all quadrants and round, tender to light palpation generalized abdomen[BW1.3]  MUSCULOSKELETAL/BACK/SPINE/EXTREMITIES:[BW1.1] bilateral upper extremities[BW1.3] and lower extremities gross motor function intact.[BW1.8]    GAIT:[BW1.1] not assessed, patient resting in bed[BW1.3]  NEURO:[BW1.1]  Sensation diminished bilateral hands and feet. Positive allodynia to right abdomen.[BW1.3]   SKIN:[BW1.1]  normal, warm, dry, wound midline abdomen well opposed, no erythema or drainage.[BW1.3]   PSYCHIATRIC/BEHAVIORAL/OBSERVATIONS:     Judgment/Insight -[BW1.1] intact[BW1.3]   Orientation -[BW1.1] oriented x 3, not acute distress[BW1.3]   Memory -[BW1.1] intact[BW1.3]   Mood and affect -[BW1.1] anxious, labile.[BW1.3]      TIME SPENT:[BW1.1] 70[BW1.3] minutes including[BW1.1] 50[BW1.3]minutes of face-to-face time counseling her  about her pain  management treatment options, and coordinating care with the primary team.    Carlos Akhtar CNP  September 29, 2017, 10:32 AM  Inpatient Pain Management Service[BW1.1]       Revision History        User Key Date/Time User Provider Type Action    > BW1.5 10/2/2017 10:04 AM Carlos Akhtar CNP Nurse Practitioner Addend     BW1.4 10/2/2017 10:02 AM Carlos Akhtar CNP Nurse Practitioner Addend     BW1.6 9/29/2017 12:53 PM Carlos Akhtar CNP Nurse Practitioner Sign     BW1.8 9/29/2017 12:41 PM Carlos Akhtar CNP Nurse Practitioner      BW1.3 9/29/2017 12:11 PM Carlos Akhtar CNP Nurse Practitioner      BW1.7 9/29/2017 11:36 AM Carlos Akhtar CNP Nurse Practitioner      BW1.2 9/29/2017 10:32 AM Carlos Akhtar CNP Nurse Practitioner      BW1.1 9/29/2017 10:31 AM Carlos Akhtar CNP Nurse Practitioner             Consults by Brit Raymond MD at 9/29/2017  8:33 AM     Author:  Brit Raymond MD Service:  Physical Medicine and Rehabilitation Author Type:  Physician    Filed:  9/29/2017 12:59 PM Date of Service:  9/29/2017  8:33 AM Creation Time:  9/29/2017  8:20 AM    Status:  Signed :  Brit Raymond MD (Physician)     Consult Orders:    1. Physical Medicine & Rehab General Adult IP Consult: Patient to be seen: Routine within 24 hrs; Call back #: [phone]; Patient diagnosed with guillian-barre, now s/p abdominal surgery, appreciate recommendations for therapy after discharge; C... [[id number]] ordered by Mary Zarco MD at 09/28/17 1605                [facility]   PM&R CONSULT    Consulting Provider: Dr. Zarco   Reason for Consult: Assessment of rehabilitation   Location of Patient: [facility]  Date of Encounter: 9/29/2017   Date of Admission: 9/25/2017      ASSESSMENT/PLAN:    Ms. 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 She is s/p IVIg x 5d (9/18) with improvement[KV1.1] and[KV1.3] radical LISA, BS, LO, poss RO and staging on 9/27 with Dr. Cordero[KV1.1].[KV1.3] Overall, the patient's GBS recovery trajectory has been quite impressive based on history and exam today.[KV1.2] She present[KV1.3]s[KV1.2] with decreased strength and endurance resulting in new functional impairments with mobility and ADLs secondary to above.     Pain control and bloating have been an ongoing issue for the patient post-operatively.  She is requiring IV dilaudid bumps. Her respiratory status has been stable. Patient is at risk for dysautonomia[KV1.3] given GBS. Patient with recent surgery, opioid use and risk for dysautonomia, all of which could compound risk[KV1.2] for adynamic ileus. Recommend ongoing monitoring of HR, BP[KV1.3], bowels,[KV1.2] and UOP[KV1.3].     PM&R will reassess on Monday 10/2 given need for ongoing IV pain medications through 10/1. 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 She is s/p IVIg x 5d (9/18)  "with improvement. She was transferred to [facility] management. She underwent radical LISA, BS, LO, poss RO and staging on 9/27 with Dr. Cordero. She has been followed by Neurology who do not recommend additional treatments for GBS at this time. Paraneoplastic panel to be sent if patient does have gyn malignancy for diagnosis purposes only - surgical path pending. NIF/FVC q12h have been stable.     Pain is ongoing with patient requiring dilaudid IV bumps, attempting to transition to PO with PCA weaned.[KV1.1]  Pain is described as deep in abdomen related to surgery and bloating. She also has numbness and pain in her hands and feet which is difficult for her to describe. She has not appreciated any improvement in pain with gabapentin dose increase. She is bothered equally by her abdominal pain and the pain in her hands and feet.[KV1.2] Patient is anxious about pain management.[KV1.1] She was seen by pain today.[KV1.2] She is voiding spontaneously. Bloating/gas have been an ongoing issue[KV1.1] and[KV1.2] she is taking simethicone.[KV1.1] Denies BM since surgery. No n/v. Tolerating PO though not eating much. Not sle[KV1.2]eping well[KV1.1] 2/2 pain[KV1.2].       PREVIOUS LEVEL OF FUNCTION   Independent with mobility, ADLs and IADLs.[KV1.1] Driving.[KV1.2]       CURRENT FUNCTION   9/28 PT:  \"Pt presents with numbness/tingling in B hands/feet, impaired balance and LE weakness. Educated on abdominal precautions, pt transfers supine>sitting with SBA with HOB at 60 degree and use of bed railing. EOB<>BSC with CGA-min A. Pt ambulated 70 ft with FWW, CGA. Pt declines use of gait belt/socks despite education on importance for safety/infection control.\"       LIVING SITUATION/SUPPORT  Mobile home in [place]. [address] no steps within the home. She lives with 2 roommates.     SOCIAL HISTORY  Social History     Social History     Marital status: Single     Spouse name: N/A     Number of children: N/A     Years of education: N/A " "    Social History Main Topics     Smoking status: None     Smokeless tobacco: None     Alcohol use None     Drug use: None     Sexual activity: Not Asked     Other Topics Concern     None     Social History Narrative   Works as a .      Past Medical History:  Past Medical History:   Diagnosis Date     Guillain Barré syndrome Probable perineoplastic      Obesity      Pelvic mass        Current Medications:  Current Facility-Administered Medications   Medication     magnesium hydroxide (MILK OF MAGNESIA) suspension 30 mL     DULoxetine (CYMBALTA) EC capsule 30 mg     enoxaparin (LOVENOX) injection 40 mg     oxyCODONE (ROXICODONE) IR tablet 5-10 mg     carboxymethylcellulose (REFRESH PLUS) 0.5 % ophthalmic solution 1 drop     simethicone (MYLICON) chewable half-tab 120 mg     HYDROmorphone (PF) (DILAUDID) injection 0.3-0.5 mg     bupivacaine liposome (EXPAREL) LONG ACTING injection was administered into the infiltration site to produce postsurgical analgesia. Duration of action is up to 72 hours, and other \"akhil\" medications should not be given for 96 hours with the exception of the lidocaine 5% patch (LIDODERM) and the lidocaine 10mg in potassium infusions. This entry is for INFORMATION ONLY.     lidocaine 1 % 1 mL     lidocaine (LMX4) kit     sodium chloride (PF) 0.9% PF flush 3 mL     sodium chloride (PF) 0.9% PF flush 3 mL     acetaminophen (TYLENOL) tablet 650 mg     senna-docusate (SENOKOT-S;PERICOLACE) 8.6-50 MG per tablet 1-2 tablet     bisacodyl (DULCOLAX) Suppository 10 mg     benzocaine-menthol (CEPACOL) 15-3.6 MG lozenge 1-2 lozenge     ibuprofen (ADVIL/MOTRIN) tablet 600 mg     ondansetron (ZOFRAN-ODT) ODT tab 4 mg     prochlorperazine (COMPAZINE) injection 5-10 mg     ranitidine (ZANTAC) tablet 150 mg     metroNIDAZOLE (FLAGYL) tablet 500 mg     ciprofloxacin (CIPRO) tablet 500 mg     naloxone (NARCAN) injection 0.1-0.4 mg     HYDROmorphone (PF) (DILAUDID) injection 0.5 mg     " "gabapentin (NEURONTIN) capsule 900 mg       Review of Systems:  Total of ten systems reviewed, pertinent positives and negatives as follows  Instability with standing and walking.    Feels generally fatigued  Reports weakness in legs  Paraesthesias in BLE  No problems with bladder.   LBM  No chest pain. No cough or SOB.  No visual changes.   No headache or photophobia.   No nausea, or abdominal pain. Tolerating regular diet.   Slept poorly last night  No joint pain, muscle pain or swelling.  Mood is anxious  Remainder of the review of the systems was negative.        Labs   Lab Results   Component Value Date    WBC 3.9 (L) 09/28/2017    HGB 9.0 (L) 09/28/2017    HCT 28.7 (L) 09/28/2017    MCV 85 09/28/2017     09/28/2017     Lab Results   Component Value Date     09/28/2017    POTASSIUM 3.7 09/28/2017    CHLORIDE 105 09/28/2017    CO2 25 09/28/2017     (H) 09/28/2017     Lab Results   Component Value Date    GFRESTIMATED 83 09/28/2017    GFRESTBLACK >90 09/28/2017     Lab Results   Component Value Date    AST 21 09/28/2017    ALT 21 09/28/2017    ALKPHOS 41 09/28/2017    BILITOTAL 0.3 09/28/2017     No results found for: INR  Lab Results   Component Value Date    BUN 9 09/28/2017    CR 0.75 09/28/2017         ON EXAMINATION:  Vitals:    09/28/17 1440 09/28/17 1943 09/28/17 2236 09/29/17 0738   BP: 115/69 141/62 131/79 118/62   BP Location: Left arm Left arm Left arm Left arm   Pulse: 98 84 88 90   Resp: 16 16 16 16   Temp: 97.9  F (36.6  C) 98  F (36.7  C) 97.6  F (36.4  C) 98.1  F (36.7  C)   TempSrc: Oral Oral Oral Oral   SpO2: 93% 98% 95% 95%   Weight:       Height:           Physical Exam:  Blood pressure 118/62, pulse 90, temperature 98.1  F (36.7  C), temperature source Oral, resp. rate 16, height 1.753 m (5' 9\"), weight 100.1 kg (220 lb 11.2 oz), SpO2 95 %.    GEN: NAD, lying comfortably in bed  She is alert, appropriate, cooperative  Speech is[KV1.1] clear and regular " rate[KV1.2]  Comprehension is[KV1.1] intact[KV1.2]  HEENT: NCAT  RESPIRATORY:[KV1.1] breathing easily on RA[KV1.2]  MSK: full active and passive ROM at all major joints of the bilaterally upper and lower extremities[KV1.1].[KV1.2]   No muscle atrophy noted  NEURO:   CRANIAL NERVES: Pupils equal, round and reactive to light. Extraocular movements full. Visual fields full.  Face moves symmetrically.  Tongue midline. Hearing intact to finger rubbing.[KV1.1] Sensation intact on face.  Palate  elevates symmetrically.  SCM and shoulder shrug intact b/l.    Sen[KV1.2]sation: sensation to[KV1.1] light thouch[KV1.2] intact throughout[KV1.1] with paresthesias in right L5 distribution[KV1.2]   Strength:[KV1.1] Patient with 4+/5 strength in BUE with shoulder abduction, elbow flex/ext, wrist ext, finger flexion    >3/5 strength in b/l hip flex. 4/5 strength with knee flexion/ext, ankle dorsi/plantar flexion, and EHL b/l[KV1.2]   Gait:[KV1.1] deferred[KV1.2]   Cognition: fund of knowledge and train of thought appropriate  SKIN: no rashes or lesions noted.    EXT:[KV1.1] no[KV1.2] edema bilaterally, chronic stasis changes, no ulcers.   PSYCH:[KV1.1] Flat[KV1.2] affect.  Mood[KV1.1] labile.[KV1.2]       Thank you for the consult.     Patient discussed and examined with Dr. Raymond[KV1.1]       I, Brit Raymond, saw this patient with my resident Dr. Gill and agree with her findings and plan of care as documented in her note above.     I personally reviewed the chart (vitals signs, medications, labs and imaging).[SS1.1]     Time Spent on this Encounter[SS1.2]   I spent 60 minutes on the unit/floor managing the care of Lynne Llanos. Over 50% of my time was spent counseling the patient and/or coordinating care regarding services listed in this not[SS1.1]                 Revision History        User Key Date/Time User Provider Type Action    > SS1.2 9/29/2017 12:59 PM Brit Raymond, MD Physician Sign     SS1.1  2017 12:56 PM Brit Raymond MD Physician      KV1.2 2017 11:45 AM Nabila Patel MD Resident Share     KV1.3 2017  8:42 AM Nablia Patel MD Resident      KV1.4 2017  8:23 AM Nabila Patel MD Resident      KV1.1 2017  8:20 AM Nabila Patel MD Resident             Consults by Cam Soto MD at 2017  4:09 PM     Author:  Cam Soto MD Service:  Neurology Author Type:  Physician    Filed:  2017  8:16 AM Date of Service:  2017  4:09 PM Creation Time:  2017  4:09 PM    Status:  Signed :  Cam Soto MD (Physician)     Consult Orders:    1. Neurology General Adult IP Consult: Patient to be seen: Routine within 24 hrs; Call back #: [phone]; possible paraneoplastic guillian barre syndrome s/p 5 days IVIG; Consultant may enter orders: Yes [[id number]] ordered by Crista Bennett APRN CNP at 17 1845                [facility]  Neurology Consultation    Patient Name:  Lynne Llanos  MRN:  [id number]    :  1970  Date of Service:  2017  Primary care provider:  No Ref-Primary, Physician      Neurology consultation service was asked to see Lynne Llanos by[AH1.1] ANGELINA Sheridan CNP[AH1.2] to evaluate[AH1.1] probable Guillan-Encinal[AH1.2].    History of Present Illness:[AH1.1]   Ms. Llanos is a[AH1.2] 47 year old female[AH1.1] with no significant past medical history[AH1.2] who presents[AH1.1] as a transfer from OSH for a[AH1.2] 3 week history of pain, numbness and weakness in her arm and legs, speech and swallowing difficulties. She[JC1.1] states she[AH1.2] awoke on [JC1.1] and noticed[AH1.2] numbness[JC1.1] of[AH1.2] her hands and feet followed by a shooting pain from her fingers[JC1.1] on her right hand[AH1.2] to her elbow.[JC1.1] She states numbness[AH1.2] in her feet has since extended up to her mid calf. A few days[JC1.1] after  initial[AH1.2] onset[JC1.2] she[JC1.1] says she[AH1.2] began experiencing weakness in her legs with difficulty walking[JC1.1],[JC1.2] right-sided facial droop and difficulty closing her right eye[JC1.1] fully[AH1.2]. She[JC1.1] says she[AH1.2] eventually developed the inability to walk to the bathroom and[JC1.1] eventually started using[AH1.2] a cane. She[JC1.1] reports[AH1.2] has had a few episodes of[JC1.1] functional[AH1.2] incontinence due to not being able to reach the bathroom in time. She[JC1.1] states she[AH1.2] noted difficulty speaking[JC1.1] clearly[AH1.2] and swallowing about 2 weeks[JC1.1] prior to presentation[AH1.2], but no shortness of breath. She[JC1.1] reports having[AH1.2] had a non-productive, chronic cough that developed about a month before the symptoms started but[JC1.1] denies[AH1.2] fevers, chills, night sweats, weight loss, rashes, nausea, vomiting or diarrhea.     Due to concern for stroke she presented to the ED were she underwent a MRI of brain, cervical, and lumbar spine, and a lumbar puncture. Due to concern for possible Guillan-[place] syndrome[JC1.1] supported by clinical history and CSF with albuminocytologic dissociation[AH1.2] she was[JC1.1] treated with[AH1.2] 5 days of IVIG with some[JC1.1] however incomplete[AH1.2] improvement in most of her symptoms.[JC1.1]     The imaging to evaluate her spine incidentally demonstrated a[AH1.2] pelvic mass[JC1.1] which has been further characterized by dedicated imaging which is[AH1.2] concerning for malignancy[JC1.1], and subsequently raised suspicion for a[AH1.2] paraneoplastic[JC1.1] etiology of her weakness[AH1.2]. She recalls missing her period for the last 4 months but assumed she was p[JC1.1]mary[AH1.2]men[JC1.1]opausal[AH1.2]. She[JC1.1] denies[AH1.2] new sexual partners, exposure to STDs, pelvic pain or pressure. Due to the need surgical staging procedure and management of the suspected paraneoplastic GBS symptoms she was transferred to  the [facility].[JC1.1]     ROS  A 10-point ROS was performed as per HPI. Pertinent negatives: diplopia, dyspnea.     PMH[AH1.1]  Past Medical History:   Diagnosis Date     Guillain Barré syndrome Probable perineoplastic      Obesity      Pelvic mass      Past Surgical History:   Procedure Laterality Date     FOOT SURGERY Right      Right knee surgery x 4       TUBAL LIGATION[AH1.3]       Medications[AH1.1]   Prescriptions Prior to Admission   Medication Sig Dispense Refill Last Dose     HYDROmorphone, STANDARD CONC, (DILAUDID) 1 MG/ML LIQD liquid Inject 1 mg into the vein every 2 hours as needed for moderate to severe pain   9/25/2017 at 1453     GABAPENTIN PO Take 900 mg by mouth 2 times daily   9/24/2017 at 2100     RaNITidine HCl (ZANTAC PO) Take 150 mg by mouth 2 times daily   9/24/2017 at 2100     magnesium hydroxide (MILK OF MAGNESIA) 400 MG/5ML suspension Take 30 mLs by mouth 2 times daily   9/24/2017 at 2100     senna-docusate (SENOKOT-S;PERICOLACE) 8.6-50 MG per tablet Take 2 tablets by mouth 2 times daily   9/24/2017 at 2100     enoxaparin (LOVENOX) 40 MG/0.4ML injection Inject 40 mg Subcutaneous daily   9/24/2017 at 2100     hydrocortisone 2.5 % cream Apply topically 2 times daily   9/24/2017 at 2100[AH1.4]     Allergies  Not on File[AH1.3]    Social History[AH1.1]  Social history has been reviewed.[AH1.2]    Family History[AH1.1]    Family history has been reviewed.[AH1.2]    Physical Examination   Vitals:[AH1.1] BP (!) 143/95  Pulse 83  Temp 97.2  F (36.2  C) (Oral)  Resp 16  SpO2 96%[AH1.3]  General: Adult, in NAD, cooperative  HEENT: NC/AT, no icterus,[AH1.1] R conjunctiva injected, no exudate,[AH1.2] op pink and moist  Cardiac: RRR no M  Chest: CTAB no w/c/r  Abdomen: S/NT/ND  Extremities: No LE swelling.  Skin: No rash or lesion.   Psych: Mood pleasant, affect congruent  Neuro:  Mental status: Awake, alert, attentive, oriented[AH1.1] to self, time, place, and circumstance[AH1.2].  Speech is fluent but slow.  Cranial nerves: Eyes conjugate, PERRL[AH1.1] however[AH1.2] sluggish, EOM[AH1.1] intact[AH1.2] but[AH1.1] smooth pursuits slowed[AH1.2], face symmetric at rest, asymmetric smile R less activated, bilateral[AH1.1] mild[AH1.2] ptosis and weak orb oc, R eye not blinking when L does, facial sensation intact, shoulder shrug strong, tongue midline, uvula deviates R, no dysarthria.   Motor: Tone within normal. RUE shoulder abd 4+, elbow flex 4+, ex 5, finger flex 4+, LUE shoulder abd 4-, elbow flex 4-, ex 5, finger flex 4-, BLE hip flex 4, ankle dorsi/plantarflex 5. No atrophy or twitches.   Reflexes:[AH1.1] A[AH1.2]reflexic[AH1.1] patellar, achilles, biceps despite facilitation maneuvers[AH1.2]. Toes[AH1.1] mute[AH1.2].  Sensory: Decreased sensation to light touch from midcalf down bilaterally, uppers intact[AH1.1] to light touch. Hyperalgesia to babinskis and manipulation of feet.[AH1.2]  Coordination: FNF slow but no dysmetria, HTS & RAMAN slow  Gait: Not tested    Investigations[AH1.1]   IMAGING/LABS  Performed at [facility]:  - CT of chest, abdomen and pelvis: Complex cystic mass of left adnexa, bilateral lower lobe pneumonia/subsegmental atelectasis, spinal stenosis at C6-7, and degenerative disc disea[JC1.1]se[AH1.2] at multiple levels.  - Transvaginal U/S found 5x5x4 cm fibroid, 9x8x12 left adnexal mass and normal right ovary.    - Lumbar puncture: CSF protein elevated at 174, CSF RBC 72, CSF nucleated cells 3.6/mcL,[JC1.1] 8[JC1.3]1% lymphocytes[JC1.1].[JC1.3] Cultures negative.[JC1.1] Lyme negative[AH1.2]  - Lyme titer negative, C[JC1.1]A[AH1.2]125 119[JC1.1]    Impression[AH1.1]  3 week history of progressive, sensory and motor neuropathy with bulbar symptoms and areflexia as well as albuminocytologic dissociation of CSF sample suggestive of Guillan-[place] Syndrome, in the context of possible gynecologic malignancy, thus concerning for paraneoplastic  etiology[AH1.2]    Recommendations[AH1.1]  - NIFs and FVCs q8 hours, consider intubation if NIF >-20, or FVC <10-15 ml/kg ([id number])[AH1.2]  - EMG[JC1.3] on Tuesday, will assist with coordination with tech/attending[AH1.2]  -[JC1.3] q4 hour n[AH1.2]euro checks[JC1.3]     Thank you for involving Neurology in the care of Lynne Llanos.  Please do not hesitate to call with questions/concerns (consult pager [phone]).[AH1.1]      I,[AH1.2] Dale Estrada, MS3[JC1.3] functioned as a scribe in the preparation of this note[AH1.2].[JC1.3]    Patient was[AH1.1] discussed[AH1.2] with[1.1] staff,[1.2] [AH1.1] Charles[JC1.3], and will be seen in the am[AH1.2].[JC1.3]     Yumiko Sparks MD  Neurology PGY-2  [phone][AH1.1]      Neurology Staff Addendum  I have seen and evaluated the patient on 9-26-17 and discussed with the resident team and agree with the documentation.    At this point uncertain if Guillian [place] is purely paraneoplastic meaning the antibodies are attacking a similar epitope in the patients nervous system as her malignancy (these paraneoplastic syndromes are more frequently pure motor which is not the clinical case here) or whether the guillian barre occurred in parellel related to the increased inflammatory response from the malignancy. The fact that her weakness and numbness have improved from courses of IVIG would suggest the later.  EMG/NCS should be helpful. Will comment on results.  Would hold off on further IVIG or Plasma Exchange therapy until malignancy is worked up and addressed or if symptoms progress.  Neurology will follow.      CAM HORTON MD[MH1.1]     Revision History        User Key Date/Time User Provider Type Action    > MH1.1 9/26/2017  8:16 AM Cam Horton MD Physician Sign     1.4 9/25/2017  9:02 PM Yumiko Sparks MD Resident Sign     AH1.2 9/25/2017  8:36 PM Yumiko Sparks MD Resident      JC1.2 9/25/2017  6:10 PM Darius Estr[facility]  Student Share     JC1.3 9/25/2017  6:05 PM Dale Soler Medical Student Share     JC1.1 9/25/2017  5:46 PM Dale Soler Medical Student Share     AH1.3 9/25/2017  4:50 PM Yumiko Sparks MD Resident Share     AH1.1 9/25/2017  4:09 PM Yumiko Sparks MD Resident                      Progress Notes - Physician (Notes for yesterday and today)      Progress Notes by Laurie Jones MD at 10/3/2017  6:21 AM     Author:  Laurie Jones MD Service:  Gyn/Onc Author Type:  Physician    Filed:  10/3/2017  3:02 PM Date of Service:  10/3/2017  6:21 AM Creation Time:  10/3/2017 12:47 AM    Status:  Signed :  Laurie Jones MD (Physician)               Gynecology Oncology Progress Note    Date: 10/3/2017     HD#9, POD#6  Procedure: Modified Radical LISA, BSO, bilateral ureterolysis, cystoscopy, proctoscopy    Disease: Left complex cystic mass of left adnexa, benign endometriosis on frozen    24 hour events:   -[SZ1.1] Pain control adequate on oral regimen  - Diarrhea x4, C dif negative  - Acute onset giovanna incisional with straining during bowel movement; now resolved  - PMR no longer recommends ARU, instead TCU  - Accepted to [facility] in [facility] as there are no TCU's in the area  - Hematology consult with normal smear, flow cytometry, B12/folate; pending viral panel - recommend ongoing CBC monitoring after discharge/transfer with possible outpatient bone marrow biopsy if not improved  - Final pathology, as noted below, benign[SZ1.2]    Subjective:[SZ1.1] Doing well this am, pain has improved since last evening[SZ1.2]. Thinks she will be able to work with PMR/PT/OT today.[SZ1.1] Would like to travel to [place] today.[SZ1.2] No N/V. Passing flatus,[SZ1.1] had diarrhea x4 yesterday, none additional overnight[SZ1.2]. Tolerating regular diet. Ambulating with baseline difficulty and pain but no dizziness.[SZ1.1] Ongoing slow improvement in extremity weakness and  numbness.[SZ1.2]    Objective:[SZ1.1]   Patient Vitals for the past 24 hrs:   BP Temp Temp src Pulse Heart Rate Resp SpO2   10/02/17 2350 103/58 98.2  F (36.8  C) Oral 81 - 16 96 %   10/02/17 1524 105/63 97.5  F (36.4  C) Oral - 72 16 96 %   10/02/17 1156 112/64 97.2  F (36.2  C) Oral - 72 16 94 %[SZ1.3]     EXAM:   General: appears fatigued but interactive, in NAD  CV: regular rate[SZ1.1] and rhythm[SZ1.2]  Resp:[SZ1.1] CTAB, no crackles[SZ1.2]  Abdomen: soft, appropriately tender, mildly distended  Incision: well healed vertical midline incision, clean/dry with no evidence of surrounding erythema, drainage or induration.   MSK/Neuro:[SZ1.1] Moves all extremities spontaneously, strength grossly normal[SZ1.2]  Extremities: nontender, trace nonpitting edema, SCDs not in place[SZ1.1]    I/O last 3 completed shifts:  In: 1030 [P.O.:1010; I.V.:20]  Out: 200 [Urine:200][SZ1.3]     Intake: 24h // overnight (mL)  PO:[SZ1.1] 1010[SZ1.2] //[SZ1.1] 240[SZ1.4]  IV: 20 // 0  Output: 24h // overnight (mL)  UOP:[SZ1.1] 200 + 5[SZ1.2] unmeasured void //[SZ1.1] 200[SZ1.4]  Stool:[SZ1.2] 5[SZ1.4] unmeasured stools[SZ1.2]      Lines/Drans: PICC    Labs:   Hgb 11.1 (OSH)>12>11.1>>9.[SZ1.1]5>[SZ1.2]pending[SZ1.4]  Plts 188 >>294>[SZ1.1]>[SZ1.2]pending[SZ1.4]  WBC 3.87 (OSH)>3.2>>3.9>1.6>2.6[SZ1.1]>[SZ1.2]pending[SZ1.4]  ANC 0.6>1.0>0.7[SZ1.1]>[SZ1.2]pending[SZ1.4]  B12 478  Folate 13.8  C dif negative[SZ1.2]   Na 127>132 (OSH)>135>>>139 (9/30)  Cr 0.67 (9/30)  K 4.4 (9/30)[SZ1.1]    Flow cytometry: Polytypic B cells, no aberrant immunophenotype on T cells, rare to absent blasts, no evidence of malignancy    Peripheral smear: Slight normochromic, normocytic anemia with minimal poikilocytosis; moderate leukopenia and neutropenia    Final pathology: Uterus, cervix, bilateral tubes and ovaries   - Chronic cervicitis   - Weakly proliferative endometrium   - Adenomyosis   - Endometriosis and chronic salpingitis, left fallopian tube   -  Endometriotic cyst, left ovary   - Endometriosis and cortical inclusion cysts, right ovary   - Right adnexal endometriosis   - Negative for malignancy[SZ1.2]     Pending cultures: None to report; CSF culture negative at OSH    Imaging: None to report    Active Problem list:  Postoperative state  Suboptimal pain control  Left complex cystic adnexal mass, elevated  - c/w endometriosis on frozen  Gullian-[place] syndrome  Fascial weakness with right facial droop  Neuropathy in BLE/BUE  Generalized deconditioning/weakness  Hyponatremia; resolve[SZ1.1]d[SZ1.2]  Concern for diverticular abscess[SZ1.1]   Neutropenia[SZ1.2]    Assessment/Plan: Lynne Llanos is a  46 yo female HD#[SZ1.1]9[SZ1.2] after KELLY from [facility] ([place], [place]) where she was admitted for Guillan [place] Syndrome (felt secondary to 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 She was transferred for surgical management of left adnexal mass w/ concern for possible CA. Now POD#[SZ1.1]6[SZ1.2] s/p modified radical LISA, BSO, cystoscopy, proctoscopy, bilateral ureterolysis on 9/27 with Dr. Cordero. Postoperative course complicated by poor pain control secondary to subacute neuropathic pain and postoperative pain.[SZ1.1] Now meeting goals for discharge with anticipated discharge today to [facility].[SZ1.2]     Dz:   -[SZ1.1] Endometriosis, final pathology reviewed with patient[SZ1.2]  FEN:   - Tolerating regular diet. MIVF off. Hyponatremia on admission, resolved.   Pain:   - s/p TAP blocks & dialudid PCA with poor pain control postoperatively  - Continue oxycodone 15mg q4h,[SZ1.1] encouraged weaning to 10mg with patient[SZ1.2]  -[SZ1.1] Continue non opioid pain management including[SZ1.2] Gabapentin 1200 mg TID[SZ1.1], ibuprofen and tylenol, Cymbalta[SZ1.2] robaxin, neurontin and Ketalar cream for  neuropathic pain[SZ1.1].[SZ1.2]  -[SZ1.1] Appreciate pain service involvement[SZ1.2]  Heme:   - Stable acute blood loss anemia with no signs of ongoing bleeding on exam  - Neutropenia/Leukopenia:[SZ1.1] Unexplained, appreciate hematology recommendations[SZ1.2]. Appropriately elevated reticulocyte response with otherwise normal[SZ1.1] workup including flow cytometry, smear, B12/folate; pending[SZ1.2] hepatitis[SZ1.5] viral panel.   - Heme recommends outpatient CBC trends after discharge and consideration of outpatient bone marrow biopsy if ongoing.  C[SZ1.2]V:   - No current issues  Pulm:   - Continue to monitor negative inspiratory force/FVC q12h per neuro recs; within normal parameters.  - Encourage incentive spirometer.  GI:   - Continue senna colace and dulcolax[SZ1.1], hold for loose stools[SZ1.2]; C dif negative - likely iatrogenic diarrhea.[SZ1.4]  - PRN anti emetics, zantac and simethicone available  - Appreciate adequate return of bowel function  :   - No current issues, voiding spontaneous s/p mendez with normal UA for urinary frequency on POD#3  ID:   - Neutropenic precautions, neutropenic as above  - Concern for diverticular abscess intraop: D#[SZ1.1]7[SZ1.2]/14 flagyl/cipro  Endocrine:   - No issues  Psych/Neuro/MSK:   - Guillain-[place] Syndrome: Initial concern for paraneoplastic syndrome[SZ1.1] however pathology reveals benign endometriosis[SZ1.2]. See prior notes for comprehensive workup[SZ1.1]; s/p IVIG x5d 9/18 with improvement.[SZ1.2] Neurology has signed off and recommends follow up with neurology in 4-8 weeks post discharge.  - Continue q12h neuro checks and BID negative inspiratory force and FVCs[SZ1.1] while admitted[SZ1.2]   -[SZ1.1] To TCU per[SZ1.2] PM&R and[SZ1.5] PT/OT, will continue rehabilitation on outpatient basis[SZ1.2]  - Appreciate[SZ1.1] gradual[SZ1.2] improvement in LE and UE weakness and pain symptoms  PPX:   - Lovenox PPX[SZ1.1], consider home lovenox x4w postoperatively given  deconditioning and prolonged immobility[SZ1.5]; pt declining SCD's; encourage ambulation and IS    Disposition:[SZ1.1] Anticipate discharge today to [place] if transportation arrangements and transfer is accepted[SZ1.2]    Shahana Ospina MD  OB GYN PGY-3[SZ1.1]  10/3/2017[SZ1.3]  .[SZ1.1]    I saw and evaluated the patient. I agree with the findings and the plan of care as documented in Dr. Ospina 's note.     Laurie Jones MD  Gynecologic Oncology  Pager [phone][BE1.1]      Revision History        User Key Date/Time User Provider Type Action    > BE1.1 10/3/2017  3:02 PM Laurie Jones MD Physician Sign     SZ1.5 10/3/2017  6:22 AM Shahana Ospina MD Resident Sign     SZ1.4 10/3/2017  6:16 AM Shahana Ospina MD Resident Share     SZ1.2 10/3/2017  1:39 AM Shahana Ospina MD Resident Share     SZ1.3 10/3/2017 12:48 AM Shahana Ospina MD Resident      SZ1.1 10/3/2017 12:47 AM Shahana Ospina MD Resident             Progress Notes by Shahana Ospina MD at 10/2/2017 10:29 PM     Author:  Shahana Ospina MD Service:  Gyn/Onc Author Type:  Resident    Filed:  10/2/2017 10:33 PM Date of Service:  10/2/2017 10:29 PM Creation Time:  10/2/2017 10:29 PM    Status:  Signed :  Shahana Ospina MD (Resident)         Brief Gyn Onc Note    Called to room for evaluation. Pt was having a BM this evening and was straining hard - she has passed liquid stool now 4x today, but feels as though she has a larger solid BM to pass. She is not interested in a dulcolax. She is concerned because she had acute giovanna incisional pain while straining and is now sore diffusely in her abdomen.[SZ1.1]     Vitals:    10/01/17 1457 10/02/17 0012 10/02/17 1156 10/02/17 1524   BP: 117/55 113/65 112/64 105/63   BP Location: Left arm Left arm Left arm Left arm   Pulse:  81     Resp: 16 16 16 16   Temp: 97.8  F (36.6  C) 97.7  F (36.5  C) 97.2  F (36.2  C) 97.5  F (36.4  C)   TempSrc: Oral Oral Oral Oral   SpO2: 96% 95% 94% 96%   Weight:        Height:[SZ1.2]         Gen: NAD, a/o  CV: RRR  Pulm: No increased work of breathing[SZ1.1]  Abd: Abdominal wall intact with no palpable hernia or defect. Soft, tender, non distended, no guarding or rebound. Incision c/d/i without drainage. No induration or erythema. No fluctuance.  Ext: No edema, nontender, SCD's in place    A/P: Likely musculoskeletal pain secondary to straining, no palpable abdominal wall defect and I do not think sutures have been compromised. Notable liquid stool for the last 24h, pt describes as watery. Will obtain C dif given liquid stool and she has been on PO abx (however this does include flagyl). Reassured pt regarding abdominal exam and encouraged bowel regimen rather than straining if she believes she has a larger solid stool to pass.[SZ1.3]     Shahana Ospina[SZ1.4], MD  OB GYN PGY-3[SZ1.3]     Revision History        User Key Date/Time User Provider Type Action    > SZ1.4 10/2/2017 10:33 PM Shahana Ospina MD Resident Sign     SZ1.3 10/2/2017 10:31 PM Shahana Ospina MD Resident      SZ1.2 10/2/2017 10:30 PM Shahana Ospina MD Resident      SZ1.1 10/2/2017 10:29 PM Shahana Ospina MD Resident             Progress Notes signed by Guadalupe Dunham at 10/2/2017  2:46 PM      Author:  Charla, Provider Service:  (none) Author Type:  Physician    Filed:  10/2/2017  2:46 PM Date of Service:  9/29/2017  5:47 AM Creation Time:  10/2/2017  2:46 PM    Status:  Signed :  Guadalupe Dunham (Physician)     Scan on 10/2/2017  2:46 PM by Charla, Provider : SITE MARKING DIAGRAM 1          Revision History        User Key Date/Time User Provider Type Action    > [N/A] 10/2/2017  2:46 PM Scan, Provider Physician Sign            Progress Notes signed by Charla Provider at 10/2/2017  2:46 PM      Author:  Charla, Provider Service:  (none) Author Type:  Physician    Filed:  10/2/2017  2:46 PM Date of Service:  9/29/2017  5:47 AM Creation Time:  10/2/2017  2:46 PM    Status:  Signed :  Guadalupe Dunham  (Physician)     Scan on 10/2/2017  2:46 PM by Scan, Provider : SITE MARKING DIAGRAM 1          Revision History        User Key Date/Time User Provider Type Action    > [N/A] 10/2/2017  2:46 PM Scan, Provider Physician Sign            Progress Notes by Laurie Jones MD at 10/2/2017  2:14 AM     Author:  Laurie Jones MD Service:  Gyn/Onc Author Type:  Physician    Filed:  10/2/2017  9:45 AM Date of Service:  10/2/2017  2:14 AM Creation Time:  10/2/2017  2:14 AM    Status:  Signed :  Laurie Jones MD (Physician)               Gynecology Oncology Progress Note    Date: 10/[SZ1.1]2[SZ1.2]/2017     HD#8, POD#5  Procedure: Modified Radical LISA, BSO, bilateral ureterolysis, cystoscopy, proctoscopy    Disease: Left complex cystic mass of left adnexa, benign endometriosis on frozen    24 hour events:   -[SZ1.1] Ongoing pain management, increased gabapentin to 1200, 900, 1200 and off of all IV dilaudid  - Required oxycodone 15mg q4h for adequate 5-7/10 pain control  - LOURDES drain removed[SZ1.2]  - Small serosanguinous discharge from wound[SZ1.3]    Subjective:[SZ1.1] Doing okay - states pain was well controlled overnight with the 15mg q4h. Thinks she will be able to work with PMR/PT/OT today. States she will be going to [place] to inpatient rehab but heard they don't have a bed until tomorrow. No N/V. Passing flatus, no BM yesterday but had two the day before. Tolerating regular diet. Ambulating with baseline difficulty and pain but no dizziness. In retrospect, does think that her extremity numbness and pain has improved slightly this admission.[SZ1.3]    Objective:[SZ1.1]   Patient Vitals for the past 24 hrs:   BP Temp Temp src Pulse Heart Rate Resp SpO2   10/02/17 0012 113/65 97.7  F (36.5  C) Oral 81 81 16 95 %   10/01/17 1457 117/55 97.8  F (36.6  C) Oral - 82 16 96 %   10/01/17 0752 103/55 98.2  F (36.8  C) Oral - 90 16 95 %[SZ1.4]     EXAM:   General: appears fatigued but interactive, in NAD  CV:  regular rate  Resp: no increased work of breathing on room air  Abdomen: soft, appropriately tender, mildly distended  Incision: well healed vertical midline incision, clean/dry with no evidence of surrounding erythema, drainage or induration.   MSK/Neuro: able to move all extremities, speed of gross motor movements are improving  Extremities: nontender, trace nonpitting edema, SCDs not in place[SZ1.1]    I/O last 3 completed shifts:  In: 940 [P.O.:900; I.V.:40]  Out: 600 [Urine:600][SZ1.4]     Intake: 24h // overnight (mL)  PO: 1120 // 200  IV: 20 // 0  Output: 24h // overnight (mL)  UOP: 1050 + 1 unmeasured void // 1 unmeasured void    Admission fluid balance -871 mL      Lines/Drans[SZ1.2]:[SZ1.1] PICC[SZ1.2]    Labs:   Hgb 11.1 (OSH)>12>11.1>>9.6[SZ1.1]>10.4>[SZ1.2]9.5[SZ1.3]  Plts 188 >[SZ1.1]>294>[SZ1.2]273[SZ1.3]  WBC 3.87 (OSH)>3.2>[SZ1.1]>[SZ1.2]3.9>1.6[SZ1.1]>2.6  ANC 0.6>1.0>[SZ1.2]0.7[SZ1.3]  Na 127>132 (OSH)>135>[SZ1.1]>>139 (9/30)  Cr 0.67 (9/30)  K 4.4 (9/30)    New labs today:  CBC with diff, as above[SZ1.2]    Pending cultures:[SZ1.1] None to report[SZ1.2]; CSF culture negative at OSH    Imaging:[SZ1.1] None to report[SZ1.2]    Active Problem list:  Postoperative state  Suboptimal pain control  Left complex cystic adnexal mass, elevated  - c/w endometriosis on frozen  Gullian-[place] syndrome  Fascial weakness with right facial droop  Neuropathy in BLE/BUE  Generalized deconditioning/weakness  Hyponatremia; resolve[SZ1.1]  Concern for diverticular abscess[SZ1.2]     Assessment/Plan: Lynne Llanos is a  48 yo female HD#[SZ1.1]8[SZ1.2] after KELLY from [facility] ([place], [place]) where she was admitted for Guillan [place] Syndrome (felt secondary to 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 She was transferred for surgical management of left adnexal mass w/ concern for  possible CA. Now POD#[SZ1.1]5[SZ1.2] s/p modified radical LISA,[SZ1.1] BSO, cystoscopy, proctoscopy, bilateral ureterolysis[SZ1.2] on 9/27 with Dr. Cordero.[SZ1.1] Postoperative course complicated by poor pain control secondary to subacute neuropathic pain and postoperative pain.[SZ1.2]     Dz:[SZ1.1]   - Benign;[SZ1.2] Frozen pathology c/w endometriomas. On laparotomy, adhesions and purulent fluid concerning for diverticular abscess, less likely TOA.   FEN:[SZ1.1]   - Tolerating regular diet. MIVF off. Hyponatremia on admission, resolved.[SZ1.2]   Pain:[SZ1.1]   -[SZ1.2] s/p TAP blocks & dialudid PCA with poor pain control postop[SZ1.1]eratively[SZ1.2]  -[SZ1.1] Continue oxycodone 15mg q4h, consider titration down to 10 mg q4h today[SZ1.2]  -[SZ1.1] Gabapentin 1200 mg TID today, titrated up from 900 mg TID since POD#3 per pain service[SZ1.2]  - Ibuprofen & tylenol scheduled[SZ1.1], pt continues to refuse tylenol given h/o ex partner with tylenol overdose[SZ1.2]  -[SZ1.1] Appreciate pain service involvement[SZ1.2]  Heme:[SZ1.1]   - Stable acute blood loss anemia with no signs of ongoing bleeding on exam[SZ1.2]  - Neutropenia[SZ1.1]/Leukopenia: Likely secondary to acute illness per hematology. Appropriately elevated reticulocyte response with otherwise normal CBC. Pending peripheral smear and flow cytometry per hematology. Appreciate hematology recommendations.[SZ1.2]  CV:[SZ1.1]   - No current issues[SZ1.2]  Pulm:[SZ1.1]   - Continue to monitor[SZ1.2] negative inspiratory force/FVC q12h per neuro recs[SZ1.1]; within normal parameters.  - Encourage incentive spirometer.[SZ1.2]  GI:[SZ1.1]   - Continue senna colace and dulcolax  - PRN anti emetics, zantac and simethicone available  - Appreciate adequate return of bowel function[SZ1.2]  :[SZ1.1]   - No current issues, voiding spontaneous s/p mendez with normal UA for urinary frequency on POD#3[SZ1.2]  ID:[SZ1.1]   - Neutropenic precautions, neutropenic as  above[SZ1.5]  - Concern for diverticular abscess intraop:[SZ1.2] D#[SZ1.1]6[SZ1.2]/14 flagyl/cipro  Endocrine:[SZ1.1]   - No issues[SZ1.2]  Psych/Neuro/MSK:[SZ1.1]   -[SZ1.2] Guillain-[place] Syndrome[SZ1.1]: Initial concern for paraneoplastic syndrome however benign pelvic mass on frozen pathology. See prior notes for comprehensive workup. Neurology has signed off and recommends follow up with neurology in 4-8 weeks post discharge.  - Continue q12h neuro checks and BID negative inspiratory force and FVCs  - Continue OT/PT and PMR, appreciate recommendations; patient declines health psychology assessment as recommended by PMR; anticipate discharge to inpatient rehab  - Appreciate mild improvement in LE and UE weakness and pain symptoms  - Continue Cymbalta[SZ1.2],[SZ1.6] robaxin,[SZ1.7] neurontin and Ketalar cream for neuropathic pain[SZ1.6]    PPX:[SZ1.1]   -[SZ1.2] Lovenox PPX[SZ1.1]; pt declining SCD's; encourage ambulation and IS[SZ1.2]    Disposition: Discharge when meeting discharge goals after surgery including tolerating regular diet without nausea/emesis, pain well controlled on PO medications, voiding/ambulating without issue, passing flatus, vitals within normal limits. Anticipate discharge 10/2-10/3[SZ1.1] to inpatient rehabilitation, will discuss with social work today. Await hematology consult prior to DC.[SZ1.3]    Shahana Ospina MD  OB GYN PGY-3  10/2/2017[SZ1.1]  7:09 AM[SZ1.8]    I saw and evaluated the patient. I agree with the findings and the plan of care as documented in Dr. Ospina 's note.   Still working on pain control as above. Exam all appropriate for postop  Cnt antibiotics for diverticular abscess. Drain out.   Having some hot flushes but not worse than preop. Would consider HRT after acute illness has improved if still symptomatic (can be managed by her general obgyn).     Laurie Jones MD  Gynecologic Oncology  Pager [phone][BE1.1]      Revision History        User Key Date/Time  "User Provider Type Action    > BE1.1 10/2/2017  9:45 AM Laurie Jones MD Physician Sign     SZ1.8 10/2/2017  7:09 AM Shahana Ospina MD Resident Sign     SZ1.4 10/2/2017  7:08 AM Shahana Ospina MD Resident      SZ1.3 10/2/2017  7:06 AM Shahana Ospina MD Resident      SZ1.7 10/2/2017  3:31 AM Shahana Ospina MD Resident      SZ1.5 10/2/2017  3:29 AM Shahana Ospina MD Resident      SZ1.6 10/2/2017  3:28 AM Shahana Ospina MD Resident      SZ1.2 10/2/2017  2:56 AM Shahana Ospina MD Resident      SZ1.1 10/2/2017  2:14 AM Shahana Ospina MD Resident             Progress Notes by Shahana Cordero MD at 9/30/2017  6:08 AM     Author:  Shahana Cordero MD Service:  Gyn/Onc Author Type:  Physician    Filed:  10/2/2017  9:13 AM Date of Service:  9/30/2017  6:08 AM Creation Time:  9/29/2017 11:07 PM    Status:  Addendum :  Shahana Cordero MD (Physician)               Gynecology Oncology Progress Note    Date: 9/30/2017     HD#6, POD#3  Procedure: Modified Radical LISA, BSO, bilateral ureterolysis, cystoscopy, proctoscopy    Disease: Left complex cystic mass of left adnexa, benign endometriosis on frozen    24 hour events:   - Inspiratory force/FVC q12h monitoring normal overnight  - RN neuro checks q12h  - PMR consult ordered per Neuro recs  - UA showed no UTI  - Ovarian tumor markers inhibin B 16 & wnl    Subjective: Patient is feeling[PM1.1] well[PM1.2] this AM. She feels her pain[PM1.1] well[PM1.2] controlled. Pain is[PM1.1] crampy &[PM1.2] mainly in low abdomen. Tolerating regular diet without nausea or vomiting.[PM1.1] P[PM1.2]assing flatus[PM1.1] a lot[PM1.2] and no BM. Voiding spontaneously without issues & frequently. Ambulating as able[PM1.1], trying to increase frequency. She notes she has concerns about weaning her pain medications as she \"doesn't want to take any steps bakc[PM1.2].[PM1.1]\" She understands dilaudid cannot be given long term and will decrease to TID today then stop tomorrow.[PM1.2]   "     Objective:[PM1.1]   Patient Vitals for the past 24 hrs:   BP Temp Temp src Pulse Heart Rate Resp SpO2   09/29/17 1611 117/77 97.5  F (36.4  C) Oral - 85 16 94 %   09/29/17 0738 118/62 98.1  F (36.7  C) Oral 90 90 16 95 %[PM1.3]     EXAM:   General: appears fatigued but interactive, in NAD  CV: regular rate  Resp: no increased work of breathing on room air  Abdomen: soft, appropriately tender, mildly distended  Incision: well healed vertical midline incision, clean/dry with no evidence of surrounding erythema, drainage or induration.   MSK/Neuro: able to move all extremities, speed of gross motor movements are improving  Extremities: nontender,[PM1.1] trace nonpitting[PM1.2] edema, SCDs in place[PM1.1]    I/O last 3 completed shifts:  In: 1260 [P.O.:1260]  Out: 2260 [Urine:2250; Drains:10][PM1.4]     UOP[PM1.1] 775[PM1.2] cc since MN,[PM1.1] 129[PM1.2] cc/h    24h fluid balance:  -1000 mL    Drains: LLQ abdominal drain    Labs:   Hgb 11.1 (OSH) >12 > 11.1>9.0  Plts 188 > 188>182  Na 127 > 132 (OSH) > 135> 135>138  WBC 3.87 (OSH) > 3.2. 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 She was transferred for surgical management of left adnexal mass w/ concern for possible CA.  Now POD#3 s/p modified radical LISA, BS, LO, poss RO, staging on 9/27 with Dr. Cordero. She is still working on increasing ambulating, pain control and return of bowel function. She has needed a more than expected amount of pain medications for control of her pain in early postoperative course. Per RN notes, she has been requesting to be woken up to receive pain medications.    Dz: Left complex, cystic adnexal mass, measuring 6 x 9 x10 cm, with thick EMS 1.5 cm. Elevated  with concern for malignancy requiring definitive management with surgery. Frozen pathology c/w endometriomas. On laparotomy, adhesions and purulent fluid concerning for diverticular abscess, less likely TOA.   FEN: ADAT- regular. Hyponatremia prior to admit; now resolved.  Pain: s/p TAP blocks & dialudid PCA with poor pain control postop. She was on narcotics prior to admit and may have developed some tolerance.  - IV dilaudid 0.5mg q2h for pain control with plan to transition to TID dilaudid 9/30. Will discontinue IV dilaudid on 10/1.   - Oxycodone 10mg q3h. Plan to space to q4h on 10/1.   - Currently Neurontin 900mg TID. Plan to increase to 900/900/1200mg on 9/30, then 1200/900/1200mg on 10/1 and 1200mg TID on 10/2.   - Ibuprofen & tylenol scheduled (patient refusing tylenol given h/o friend who overdoses on tylenol per patient report).   - S/p pain consult on 9/29 with recommendations as above  Heme: Hgb & Plts stable postop. EBL 300mL. No symptoms of acute blood loss anemia. Continue Lovenox ppx for DVT prevention.  CV: No issues; EKG 9/25 NSR.   Pulm: Monitor negative inspiratory force/FVC q12h per neuro recs. Normal overnight.  GI: PRN bowel regimen & antiemetics. Zantac BID. Tums PRN.  : Normal Cr on admit & repeat stable. No other issues. S/p Ni (9/28) & voiding w/o issues. Given persistent increased frequency & low abdominal pain, UA on 9/29 with no concern for UTI.   ID:   - Neutropenia- secondary to iatrogenic blood draws versus  ongoing illness. Stable Plts & Hgb on last check. Continue to monitor closely. - Afebrile. 9/14 CSF Cx neg @ OSH. 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 CSF w/ elevated protein (OSH) & EMG here consistent with GBS diagnosis. MRI/CT (9/10) w/ cervical spine stenosis C6-7, L5-S1. Head CT neg (9/7). Lyme titers, IgA wnl @ OSH. Paraneoplasic panel sent previously to [place] [facility]; results not currently available.   - s/p 5d IVIG (9/18) w/ improvement in symptoms.  - Neuropathy/weakness in both BLE/BUE; improving, continue gabapentin titration up & close monitoring.  - Fascial muscle weakness R> L; facial droop on R; s/p PO course of prednisone 20mg/d; no recommendation for further PO steroids.   - q12h Neuro checks per RN  - Negative inspiratory force and FVCs q 12 hours, consider intubation if NIF >-20, or FVC <10-15 ml/kg ([id number])  - Hydrocortisone cream TID PRN for mild rash on back that was present prior to admission; stable.  - OT/PT assessments recommend acute rehab; PMR consult recommends health psychology assessment given new limitations with acute onset illness, final recommendations pending 10/2 assessment.     PPX: Lovenox PPX. SCDs too painful per pt & declines these. Encourage ambulation, IS.     Lines/Drains: PICC, LLQ drain    Disposition: Discharge when meeting discharge goals after surgery including tolerating regular diet without nausea/emesis, pain well controlled on PO medications, voiding/ambulating without issue, passing flatus, vitals within normal limits. Anticipate discharge 10/2-10/3 given difficulty achieving pain control.     Viky Ott MD, MPH  OB/GYN PGY4  9/30/2017[PM1.1] 6:10 AM[PM1.5]     Gyn Onc Pager [phone][PM1.1]    Provider Disclosure:   I agree with above History, Review of Systems, Physical exam  and Plan. I have reviewed the content of the documentation and have edited it as needed. I have personally performed the services documented here and the documentation accurately represents those services and the decisions I have made.     Electronically signed by:   Shahana Cordero MD   Gynecologic Oncology   [facility][SM1.1]             Revision History        User Key Date/Time User Provider Type Action    > SM1.1 10/2/2017  9:13 AM Shahana Cordero MD Physician Addend     PM1.5 9/30/2017  6:10 AM Viky Ott MD Resident Sign     PM1.4 9/30/2017  6:09 AM Viky Ott MD Resident      PM1.2 9/30/2017  6:07 AM Viky Ott MD Resident      [N/A] 9/30/2017  6:05 AM Viky Ott MD Resident Share     PM1.3 9/29/2017 11:45 PM Viky Ott MD Resident Share     PM1.1 9/29/2017 11:07 PM Viky Ott MD Resident             Progress Notes by Shahana Cordero MD at 9/28/2017  5:51 AM     Author:  Shahana Cordero MD Service:  Gyn/Onc Author Type:  Physician    Filed:  10/2/2017  9:08 AM Date of Service:  9/28/2017  5:51 AM Creation Time:  9/28/2017 12:22 AM    Status:  Signed :  Shahana Cordero MD (Physician)               Gynecology Oncology Progress Note    Date: 9/28/2017     HD# 4, POD#1   Procedure: Modified Radical LISA, BSO, bilateral ureterolysis, cystoscopy, proctoscopy    Disease: Left complex cystic mass of left adnexa, benign endometriosis on frozen    24 hour events:   - S/p above surgery  - Started on flagyl/cipro due to concern for diverticular abscess, plan for 10-14 day course  - Poor pain control, PCA dilaudid in use[PM1.1]- has used 0.4mg in last hour, no attempted doses not given, total 4.2mg used since PCA started[PM1.2]  - LLQ drain with[PM1.1] 45 cc[PM1.3] output   - Ni in place[PM1.1], UOP adequate[PM1.3]  - Inspiratory force/FVC q8h monitoring normal overnight (VC effort reported as fair but VC in mL still above desired threshhold)  -   Continued RN q8h neuro checks  - PT/OT consults pending, not yet seen  - Ovarian tumor markers  BHCG < 3, inhibin B pending    Subjective: Patient is feeling[PM1.1] ok[PM1.3] this AM. Pain[PM1.1] more[PM1.3] controlled. She notes her pain is[PM1.1] still present and mainly in her abdomen[PM1.3]. Tolerating[PM1.1] clear liquid[PM1.3] diet without nausea or vomiting. Passing flatus and[PM1.1] no[PM1.3] BM. Ni in place.[PM1.1] A[PM1.3]mbulating[PM1.1] minimally.[PM1.3]      Objective:[PM1.1]   Patient Vitals for the past 24 hrs:   BP Temp Temp src Heart Rate Resp SpO2   09/27/17 2300 107/54 98  F (36.7  C) Oral 94 14 93 %   09/27/17 2035 128/73 - - 105 - -   09/27/17 1935 113/66 - - 110 - -   09/27/17 1735 124/85 - - 100 - 98 %   09/27/17 1635 123/83 - - 97 - -   09/27/17 1605 129/83 - - 98 - -   09/27/17 1535 118/78 - - 88 - -   09/27/17 1520 125/79 - - 93 - -   09/27/17 1505 130/86 - - 94 - -   09/27/17 1450 132/86 - - 97 - -   09/27/17 1435 127/85 97.9  F (36.6  C) Oral 93 12 96 %   09/27/17 1400 119/86 - - 96 16 99 %   09/27/17 1345 120/88 - - 92 16 99 %   09/27/17 1330 125/88 - - 91 13 100 %   09/27/17 1315 (!) 131/95 - - 95 12 100 %   09/27/17 1300 128/88 98.1  F (36.7  C) Oral 92 13 99 %   09/27/17 1245 123/87 - - 97 16 99 %   09/27/17 1230 126/88 98.7  F (37.1  C) Oral 101 17 100 %[PM1.4]     EXAM:   General: appears fatigued[PM1.1] but[PM1.3] interactive, in NAD  CV: regular rate  Resp: no increased work of breathing on room air  Abdomen: soft, appropriately tender,[PM1.1] mildly[PM1.3] distended[PM1.1]  Incision: sterile bandage in place with minimal shadowing, clean/dry otherwise with no evidence of surrounding erythema, drainage or induration.[PM1.3]   MSK/Neuro: right side weaker than left per RN neuro checks, able to move all extremities, gross movements are slow but intentional  Extremities: nontender, no edema, SCDs in place[PM1.1]    I/O last 3 completed shifts:  In: 0601.67 [P.O.:620;  I.V.:4131.67]  Out: 2870 [Urine:2475; Drains:45; Blood:350][PM1.4]      cc since MN, 50 cc/h[PM1.3]    24h fluid balance:  +1882 mL    Drains: mendez, LLQ abdominal drain    Labs:   Hgb 11.1 (OSH) >12 > 11.1[PM1.1]>9.0[AS1.1]  Plts 188 > 188[PM1.1]>182[AS1.1]  Na 127 > 132 (OSH) > 135> 135[PM1.1]>138[AS1.1]  WBC 3.87 (OSH) > 3.2. 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 She was transferred for surgical management of left adnexal mass w/ concern for possible CA. Now POD#1 s/p modified radical LISA, BS, LO, poss RO, staging on 9/27 with Dr. Cordero. She is appropriate for early postop course, still working on ambulating, voiding, advancing diet and return of bowel function.[PM1.1] EBL 300mL.[PM1.5]    Dz: Left complex, cystic adnexal mass, measuring 6 x 9 x10 cm, with thick EMS 1.5 cm. Elevated  with concern for malignancy requiring definitive management with surgery. Frozen pathology c/w endometriomas. On laparotomy, adhesions and purulent fluid concerning for diverticular abscess, less likely TOA.   FEN: ADAT, IVF LR 100ml/h until adequate PO intake. Hyponatremia prior to admit; now resolved. AM[PM1.1] CMP[PM1.3] stable[AS1.1].  Pain: s/p TAP blocks. PCA bumps 0.2-0.3mg, total 1.8 hourly due to poor pain control postop. Will[PM1.1] wean PCA &[PM1.5]  resume Oxycodone once PCA dilaudid stopped. Neurontin 900mg TID. Ibuprofen. LFTs at upper limit of normal; tylenol ordred given poor pain control. Will get CMP in AM to ensure not worsening.   Heme: Hgb & Plts **.[PM1.1] No[PM1.3] symptoms of acute blood loss anemia. Continue Lovenox ppx for DVT prevention.  CV: No issues; EKG 9/25 NSR.   Pulm: Monitor negative inspiratory force/FVC q8h per neuro recs. Normal overnight but noted fair vital capacity effort (values still above threshold for intervention.)   GI: PRN bowel regimen & antiemetics. Zantac BID. Tums PRN.  : Normal Cr on admit & repeat[PM1.1] 0.75[AS1.1]. No other issues. Ni in place, will remove today and attempt voiding.[PM1.1]   I[PM1.5]D: Neutropenia- secondary to iatrogenic blood draws versus ongoing illness. Stable Plts & Hgb. Continue to monitor closely. Afebrile. 9/14 CSF Cx neg @ OSH[PM1.1]. 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 CSF w/ elevated protein (OSH) & EMG here consistent with GBS diagnosis. MRI/CT (9/10) w/ cervical spine stenosis C6-7, L5-S1. Head CT neg (9/7). Lyme titers, IgA wnl @ OSH. Parneoplasic panel sent previously to [place]; results not currently available.   - s/p 5d IVIG (9/18) w/ small improvement in sx.  - Neurology following; appreciate ongoing input & cares.  - Neuropathy/weakness in both BLE/BUE; improving, continue gabapentin & close monitoring.  - Fascial muscle weakness R> L; facial droop on R; s/p PO course of prednisone 20mg/d; appreciate Neurology recs regarding if further PO steroids indicated.   - q8h Neuro checks per RN  - Negative inspiratory force and FVCs q8 hours, consider intubation if NIF >-20, or FVC <10-15 ml/kg ([id number])  - Hydrocortisone cream TID PRN for mild rash on back that was present prior to  admission; stable.[PM1.1]  - Awaiting OT/PT assessments[PM1.5]    PPX: Lovenox PPX. SCDs too painful per pt & declines these. Encourage ambulation, IS.     Lines[PM1.1]/Drains[PM1.5]: PICC, LLQ drain &[PM1.1] mendez[PM1.5]    Disposition: Discharge when meeting discharge goals after surgery including tolerating regular diet without nausea/emesis, pain well controlled on PO medications, voiding/ambulating without issue, passing flatus, vitals within normal limits. Anticipate discharge on POD#3-4 (9/30-10/1).     Viky Ott MD, MPH  OB/GYN PGY4  9/28/2017[PM1.1] 5:56 AM[PM1.6]   Gyn Onc Pager [phone][PM1.1]      Provider Disclosure:   I agree with above History, Review of Systems, Physical exam and Plan. I have reviewed the content of the documentation and have edited it as needed. I have personally performed the services documented here and the documentation accurately represents those services and the decisions I have made.     Electronically signed by:   Shahana Cordero MD   Gynecologic Oncology   [facility][SM1.1]         Revision History        User Key Date/Time User Provider Type Action    > SM1.1 10/2/2017  9:08 AM Shahana Codrero MD Physician Sign     [N/A] 9/28/2017  6:47 PM Viky Ott MD Resident Sign     AS1.1 9/28/2017  4:08 PM Mary Zarco MD Resident Share     PM1.2 9/28/2017  6:12 AM Viky Ott MD Resident Share     PM1.6 9/28/2017  5:56 AM Viky Ott MD Resident Share     PM1.4 9/28/2017  5:54 AM Viky Ott MD Resident      PM1.3 9/28/2017  5:51 AM Viky Ott MD Resident      PM1.5 9/28/2017 12:50 AM Viky Ott MD Resident Share     PM1.1 9/28/2017 12:22 AM Viky Ott MD Resident                      Procedure Notes      Procedures by Lizzette Garcia MD at 9/25/2017  3:08 PM     Author:  Lizzette Garcia MD Service:  Gyn/Onc Author Type:  Resident    Filed:  10/1/2017  2:29 PM Date of Service:  9/25/2017  3:08 PM Creation  Time:  10/1/2017  2:18 PM    Status:  Signed :  Lizzette Garcia MD (Resident)     Procedures:    1. REMOVE DRAIN [UCP9995 (Custom)]               Pre-procedure diagnoses: Post-surgical LOURDES drain in place  Post-procedure diagnoses: Same, s/p removal of LOURDES drain    Procedure details: Suture scissors were used to cut the suture at the base of the skin, freeing the LOURDES drain. The patient was instructed to take a deep breath. While exhaling, the LOURDES drain was pulled. The patient tolerated the procedure well.     Lizzette Garcia MD   Resident Physician, PGY2  Obstetrics, Gynecology, and Women's Health[MG1.1]     Revision History        User Key Date/Time User Provider Type Action    > MG1.1 10/1/2017  2:29 PM Lizzette Garcia MD Resident Sign                  Progress Notes - Therapies (Notes from 09/30/17 through 10/03/17)     No notes of this type exist for this encounter.                                          INTERAGENCY TRANSFER FORM - LAB / IMAGING / EKG / EMG RESULTS   9/25/2017                       [address] [facility] BANK: [phone]            Unresulted Labs     None         Lab Results - 3 Days      Methicillin Resistant Staph Aureus PCR [[id number]]  Resulted: 10/03/17 1744, Result status: In process    Ordering provider: Mary Zarco MD  10/03/17 1617 Resulting lab: [facility]    Specimen Information    Type Source Collected On   Nares  10/03/17 1715            Hepatitis B core antibody [[id number]] (Abnormal)  Resulted: 10/03/17 1119, Result status: Final result    Ordering provider: Celio Holden MD  10/02/17 1252 Resulting lab: [facility]    Specimen Information    Type Source Collected On   Blood  10/02/17 1547          Components       Value Reference Range Flag Lab   Hepatitis B Core Maribel Reactive NR^Nonreactive A 51   Comment:         A reactive result indicates acute, chronic or past/resolved hepatitis B   infection.              Hepatitis C Screen Reflex  to HCV RNA Quant and Genotype [[id number]]  Resulted: 10/03/17 1042, Result status: Final result    Ordering provider: Celio Holden MD  10/02/17 1252 Resulting lab: [facility]    Specimen Information    Type Source Collected On   Blood  10/02/17 1547          Components       Value Reference Range Flag Lab   Hepatitis C Antibody Nonreactive NR^Nonreactive  51   Comment:         Assay performance characteristics have not been established for newborns,   infants, and children              HIV Antigen Antibody Combo [[id number]]  Resulted: 10/03/17 1034, Result status: Final result    Ordering provider: Celio Holden MD  10/02/17 1246 Resulting lab: [facility]    Specimen Information    Type Source Collected On   Blood  10/02/17 1547          Components       Value Reference Range Flag Lab   HIV Antigen Antibody Combo Nonreactive NR^Nonreactive      51   Comment:  HIV-1 p24 Ag & HIV-1/HIV-2 Ab Not Detected            Hepatitis B surface antigen [[id number]]  Resulted: 10/03/17 1034, Result status: Final result    Ordering provider: Celio Holden MD  10/02/17 1246 Resulting lab: [facility]    Specimen Information    Type Source Collected On   Blood  10/02/17 1547          Components       Value Reference Range Flag Lab   Hep B Surface Agn Nonreactive NR^Nonreactive  51            Hepatitis B Surface Antibody [[id number]] (Abnormal)  Resulted: 10/03/17 1034, Result status: Final result    Ordering provider: Celio Holden MD  10/02/17 1252 Resulting lab: [facility]    Specimen Information    Type Source Collected On   Blood  10/02/17 1547          Components       Value Reference Range Flag Lab   Hepatitis B Surface Antibody 345.81 <8.00 m[IU]/mL H 51   Comment:         Reactive, Patient is considered to be immune to infection with hepatitis B   when the value is  greater than or equal to 12.0 m[IU]/mL.              CBC with platelets differential [[id number]] (Abnormal)  Resulted: 10/03/17 0738, Result status: Final result    Ordering provider: Shahana Ospina MD  10/03/17 0616 Resulting lab: [facility]    Specimen Information    Type Source Collected On   Blood  10/03/17 0651          Components       Value Reference Range Flag Lab   WBC 3.5 4.0 - 11.0 10e9/L L 51   RBC Count 3.57 3.8 - 5.2 10e12/L L 51   Hemoglobin 9.8 11.7 - 15.7 g/dL L 51   Hematocrit 31.4 35.0 - 47.0 % L 51   MCV 88 78 - 100 fl  51   MCH 27.5 26.5 - 33.0 pg  51   MCHC 31.2 31.5 - 36.5 g/dL L 51   RDW 18.4 10.0 - 15.0 % H 51   Platelet Count 301 150 - 450 10e9/L  51   Diff Method Automated Method   51   % Neutrophils 38.9 %  51   % Lymphocytes 37.5 %  51   % Monocytes 16.4 %  51   % Eosinophils 6.3 %  51   % Basophils 0.6 %  51   % Immature Granulocytes 0.3 %  51   Nucleated RBCs 0 0 /100  51   Absolute Neutrophil 1.4 1.6 - 8.3 10e9/L L 51   Absolute Lymphocytes 1.3 0.8 - 5.3 10e9/L  51   Absolute Monocytes 0.6 0.0 - 1.3 10e9/L  51   Absolute Eosinophils 0.2 0.0 - 0.7 10e9/L  51   Absolute Basophils 0.0 0.0 - 0.2 10e9/L  51   Abs Immature Granulocytes 0.0 0 - 0.4 10e9/L  51   Absolute Nucleated RBC 0.0   51            Clostridium difficile toxin B PCR [[id number]]  Resulted: 10/02/17 2343, Result status: Final result    Ordering provider: Shahana Ospina MD  10/02/17 2231 Resulting lab: [facility]    Specimen Information    Type Source Collected On   Feces  10/02/17 2230          Components       Value Reference Range Flag Lab   Specimen Description Feces   51   C Diff Toxin B PCR Negative NEG^Negative  75   Comment:         Negative: Clostridium difficile target DNA sequences NOT detected, presumed   negative for Clostridium difficile toxin B or the number of bacteria present   may be below the limit of detection for the test.  FDA approved  assay performed using [employer] GeneXpert real-time PCR.  A negative result does not exclude actual disease due to Clostridium difficile   and may be due to improper collection, handling and storage of the specimen   or the number of organisms in the specimen is below the detection limit of the   assay.              Vitamin B12 [[id number]]  Resulted: 10/02/17 1714, Result status: Final result    Ordering provider: Celio Holden MD  10/02/17 1154 Resulting lab: [facility]    Specimen Information    Type Source Collected On   Blood  10/02/17 1547          Components       Value Reference Range Flag Lab   Vitamin B12 478 193 - 986 pg/mL  51            Surgical pathology exam [[id number]]  Resulted: 10/02/17 1655, Result status: Final result    Ordering provider: Shahana Cordero MD  09/27/17 1019 Resulting lab: [facility]    Specimen Information    Type Source Collected On   Tissue Other 09/27/17 1016   Comment:  FROZEN TO BE DONE ON OVARIES AND ENDOMETRIUM          Components       Value Reference Range Flag Lab   Copath Report --      Result:         Patient Name: AFIA DUKES  MR#: [id number]  Specimen #: [id number]  Collected: 9/27/2017  Received: 9/27/2017  Reported: 10/2/2017 16:13  Ordering Phy(s): SHAHANA CORDERO    For improved result formatting, select 'View Enhanced Report Format'  under Linked Documents section.    ORIGINAL REPORT:    SPECIMEN(S):  Uterus, cervix, bilateral tubes and ovaries    FINAL DIAGNOSIS:  UTERUS, CERVIX, BILATERAL TUBES AND OVARIES, TOTAL ABDOMINAL  HYSTERECTOMY AND BILATERAL SALPINGO-OOPHORECTOMY::  - Chronic cervicitis  - Weakly proliferative endometrium  - Adenomyosis  - Endometriosis and chronic salpingitis, left fallopian tube  - Endometriotic cyst, left ovary  - Endometriosis and cortical inclusion cysts, right ovary  - Right adnexal endometriosis  - Negative for malignancy    I have personally reviewed all specimens and/or slides, including  "the  listed special stains, and used them with my medical judgement to  determine or confirm the final diagnosis.    Electronically signed out by:    Kyleigh Garcia M.D., PhD, [facility]    CLINICAL HISTORY:  47 year old female with history of Guillain Barr9 Syndrome, with  elevated  to 119, and complex left adnexal mass measuring 10cm on  imaging.  GROSS:  A. The specimen is received fresh with proper patient identification,  labeled \"uterus, cervix, bilateral fallopian tubes and ovaries\".  The  specimen consists of an intact uterus (9.9 cm from fundus to cervix, 6.1  cm from cornu to cornu, 4.5 cm from anterior to posterior) with attached  right ovary (2.0 x 1.1 x 0.5 cm), possible right fallopian tube (3.0 cm  in length, 1.0 cm in greatest diameter), left fallopian tube (2.5 cm in  length, 0.5 cm in greatest diameter), and left ovary (10.0 x 6.0 x 5.0  cm).  Left ovarian mass is received disrupted.  The ectocervix (3.5 x  2.0 cm) is covered by smooth glistening white mucosa.  The external os  is slitlike (1.0 cm).  The endocervical canal (3.5 cm in length) of a  tan herringbone mucosa, and is distorted by a large (7.0 x 5.5 x  5.0 cm)  myometrial nodule.  The endometrial cavity (0.4 cm in thickness) has a  tan-pink hemorrhagic endometrial lining.  The myometrium (1.2 cm in  thickness) is also distorted by myometrial nodule. The myometrial nodule  is tan white and firm, with a whorled cut appearance, without  hemorrhagic or necrosis.  The left fallopian tube is difficult to  identify however the possible tube appears patent with a stellate lumen  and a fimbriated end.  The left adnexal mass is cystic, with smooth  inner and outer surfaces.  The inner lining has loosely adherent  hemorrhagic material, without excrescences appreciated.  The left  possible ovary is yellow white and attached to the adnexal mass.  Focal  aspects of the cyst lining has a moscoso yellow appearance to it.  The  right ovary has a " smooth outer with this with multiple corpora  albicantia.  The right fallopian tube is also difficult to identify,  however it appears of a stellate lumen which is patent as well as an  attached fimbriated en d.    Summary of Sections:  A1 - left ovarian frozen section remnant  A2 - leiomyoma frozen section remnant  A3 - frozen section remnant  A4 - frozen section remnant  A5 - anterior cervix  A6 - posterior cervix  A7 - anterior lower uterine segment  A8 - posterior lower uterine segment  A9 - anterior endomyometrium  A10 - anterior endomyometrium  A11 - posterior endomyometrium  A12-13 - myometrial nodule  A14 - possible left fallopian tube (cross sections with fimbriated end)  A15-17 - sections of left adnexal mass  A18 - possible left ovary  A19 - possible right fallopian tube (cross sections with fimbriated end)  A20 - right ovary    Dictated by: Airam Henderson DO, at 10:05 on 9/29/17.    INTRAOPERATIVE CONSULTATION:  Frozen section is performed on part A. Please refer to Epic Result  History for the Preliminary Intraoperative Diagnosis.    MICROSCOPIC:  Microscopic examination was performed.    CPT Codes:  A: 13948-ZT0, 71711-ZUF, 89275-UHC, 37064-BR, 57001-UVA    TESTING LAB LOCATIO N:  [facility], [facility] [address] [place] [address]   [id number] [phone]    COLLECTION SITE:  Client: [facility]  Location: [facility] (B)                Folate [[id number]]  Resulted: 10/02/17 1646, Result status: Final result    Ordering provider: Celio Holden MD  10/02/17 1154 Resulting lab: [facility]    Specimen Information    Type Source Collected On   Blood  10/02/17 1547          Components       Value Reference Range Flag Lab   Folate 13.8 >5.4 ng/mL  51            Blood Morphology Pathologist Review [[id number]]  Resulted: 10/02/17 1549, Result status: Final result    Ordering provider:  Lizzette Garcia MD  10/01/17 1305 Resulting lab: [facility]    Specimen Information    Type Source Collected On   Blood  10/01/17 1435          Components       Value Reference Range Flag Lab   Copath Report --      Result:         Patient Name: AFIA DUKES  MR#: [id number]  Specimen #: [id number]  Collected: 10/1/2017  Received: 10/2/2017  Reported: 10/2/2017 15:12  Ordering Phy(s): LIZZETTE GARCIA    For improved result formatting, select 'View Enhanced Report Format'  under Linked Documents section.    TEST(S):  Blood Smear Morphology    FINAL DIAGNOSIS:  Peripheral Blood Smear:  -Slight normochromic, normocytic anemia; minimal poikilocytosis  -Moderate leukopenia; neutropenia    I have personally reviewed all specimens and/or slides, including the  listed special stains, and used them with my medical judgment to  determine the final diagnosis.    Electronically signed out by:    Geeta Cantu M.D., [facility]    Technical testing/processing performed at [place], [place]    CLINICAL HISTORY:  47-year-old female. Peripheral smear review requested for leukopenia.    MICROSCOPIC DESCRIPTION:    The red blood cells appear normochromic.  Poikilocyt osis is minimal.  Polychromasia is not increased.  Rouleaux formation is not increased.  The morphology of the platelets is normal, showing a spectrum of size.    SC/EC    CLINICAL LAB RESULTS:  Battery Order No. Lab Test Code Clinical Result Ref. Range Units Result  Date  Hemogram/Diff/PLT  BU WBC Count L 2.6 4.0-11.0 10e9/L 10/1/2017  15:01       RBC Count 3.80 3.8-5.2 10e12/L 10/1/2017 15:01       Hemoglobin L 10.4 11.7-15.7 g/dL 10/1/2017 15:01       Hematocrit L 33.0 35.0-47.0 % 10/1/2017 15:01       MCV 87  fl 10/1/2017 15:01       MCH 27.4 26.5-33.0 pg 10/1/2017 15:01       MCHC 31.5 31.5-36.5 g/dL 10/1/2017 15:01       RDW H 18.8 10.0-15.0 % 10/1/2017 15:01       Platelet Count 294 150-450  10e9/L 10/1/2017 15:01        SEE TEXT   10/1/2017 15:01       Text/Comments:  Automated Method       % Neutrophils 40.6  % 10/1/2017 15:01       % Lymphocytes 43.4  % 10/1/2017 15:01       % Monocytes 12.5  % 10/1/2017 15:01       % Eosinophils 2.7  % 10/1/2017 15:01       % Basop hils 0.8  % 10/1/2017 15:01       % Immature Grans 0.0  % 10/1/2017 15:01       abs Neutrophils L 1.0 1.6-8.3 10e9/L 10/1/2017 15:01       abs Lymphocytes 1.1 0.8-5.3 10e9/L 10/1/2017 15:01       abs Monocytes 0.3 0.0-1.3 10e9/L 10/1/2017 15:01       abs Eosinophils 0.1 0.0-0.7 10e9/L 10/1/2017 15:01       abs Basophils 0.0 0.0-0.2 10e9/L 10/1/2017 15:01       abs Imm Granulocytes 0.0 0-0.4 10e9/L 10/1/2017 15:01    Retic   Retic abs H 99.6 25-95 10e9/L 10/1/2017 14:50    CPT Codes:  A: 50776-GZXUM    TESTING LAB LOCATION:  [facility], [address]   [id number] [phone]    COLLECTION SITE:  Client:  [facility]  Location:  [facility] (B)              Leukemia Lymphoma Evaluation (Flow Cytometry) [[id number]]  Resulted: 10/02/17 1337, Result status: Final result    Ordering provider: Lizzette Garcia MD  10/01/17 1305 Resulting lab: [facility]    Specimen Information    Type Source Collected On   Blood  10/01/17 1315          Components       Value Reference Range Flag Lab   Copath Report --      Result:         Patient Name: AFIA DUKES  MR#: [id number]  Specimen #: [id number]  Collected: 10/1/2017 13:15  Received: 10/2/2017 07:24  Reported: 10/2/2017 13:30  Ordering Phy(s): LIZZETTE GARCIA    For improved result formatting, select 'View Enhanced Report Format'  under Linked Documents section.  _________________________________________    SPECIMEN(S):  Blood    INTERPRETATION:  Blood:       Polytypic B cells       No aberrant immunophenotype on T cells       Rare to absent blasts       See comment    COMMENT:  There is no  immunophenotypic evidence of non-Hodgkin lymphoma, lymphoid  leukemia, or acute leukemia. T cell lymphomas cannot always be detected  by this flow cytometry assay. Circulating neoplastic cells are not  always present in lymphoma and leukemia; therefore, the peripheral blood  findings do not rule out underlying disease elsewhere.  Final  interpretation requires correlation with morphologic and clinical  features.    RESULTS:  Percentages reported below are based on the total nu mber of CD45  positive viable leukocytes. If applicable, percentage of plasma cells is  from total viable nucleated cells.    2% polytypic B cells  32% T cells with a CD4:CD8 ratio of 2.9:1. A subset of the T cells (5%  of leukocytes) express CD3 and CD57, which could represent large  granular lymphocytes.  4% NK cells    CD34 positive blasts are rare to absent.    ANTIBODIES:  Eight, nine, and ten color analyses are performed for the following  antigens: CD2, CD3, CD4, CD5, CD7, CD8, CD10, CD11b, CD13, CD15, CD16,  CD19, CD20, CD33, CD34, CD38, CD45, CD56, CD57, and kappa and lambda  immunoglobulin light chains. Cells are gated to isolate populations  (CD45 versus side scatter and forward scatter versus side scatter), to  exclude debris (forward scatter versus side scatter) and to exclude cell  doublets (forward scatter height versus forward scatter width and side  scatter height versus side scatter width). Forward scatter varies with  cell size. Side scatter varies with the amount of  cytoplasmic granules.  Intensity for CD45 usually increases as hematolymphoid cells mature.    CLINICAL HISTORY:  47 year old female with leukopenia, anemia    I have personally reviewed all specimens and/or slides, including the  listed special stains, and used them with my medical judgment to  determine the final diagnosis.    Electronically signed out by:    YULIA Wilson    Analyte Specific Reagents are used in many laboratory tests  necessary  for standard medical care and generally do not require FDA approval.  This test was developed and its performance characteristics determined  by [facility].  It has not been cleared or approved by the U.S. Food and  Drug Administration.    CPT Codes:  A: 27271-AC, 50060-FSRHAPV, 10309-HYBY>15, 04130-56-KWNK11(19)    TESTING LAB LOCATION:  [address] [address] [phone]     COLLECTION SITE:  Client:  [facility]  [place]:  [facility] [place]              CBC with platelets differential [[id number]] (Abnormal)  Resulted: 10/02/17 0420, Result status: Final result    Ordering provider: Lizzette Garcia MD  10/01/17 2200 Resulting lab: [facility]    Specimen Information    Type Source Collected On   Blood  10/02/17 9723          Components       Value Reference Range Flag Lab   WBC 2.5 4.0 - 11.0 10e9/L L 51   RBC Count 3.54 3.8 - 5.2 10e12/L L 51   Hemoglobin 9.5 11.7 - 15.7 g/dL L 51   Hematocrit 31.3 35.0 - 47.0 % L 51   MCV 88 78 - 100 fl  51   MCH 26.8 26.5 - 33.0 pg  51   MCHC 30.4 31.5 - 36.5 g/dL L 51   RDW 18.6 10.0 - 15.0 % H 51   Platelet Count 273 150 - 450 10e9/L  51   Diff Method Automated Method   51   % Neutrophils 26.7 %  51   % Lymphocytes 48.6 %  51   % Monocytes 17.5 %  51   % Eosinophils 6.0 %  51   % Basophils 1.2 %  51   % Immature Granulocytes 0.0 %  51   Nucleated RBCs 0 0 /100  51   Absolute Neutrophil 0.7 1.6 - 8.3 10e9/L L 51   Absolute Lymphocytes 1.2 0.8 - 5.3 10e9/L  51   Absolute Monocytes 0.4 0.0 - 1.3 10e9/L  51   Absolute Eosinophils 0.2 0.0 - 0.7 10e9/L  51   Absolute Basophils 0.0 0.0 - 0.2 10e9/L  51   Abs Immature Granulocytes 0.0 0 - 0.4 10e9/L  51   Absolute Nucleated RBC 0.0   51            CBC with platelets differential [[id number]] (Abnormal)  Resulted:  10/01/17 1501, Result status: Final result    Ordering provider: Lizzette Garcia MD  10/01/17 1435 Resulting lab: [facility]    Specimen Information    Type Source Collected On     10/01/17 1435          Components       Value Reference Range Flag Lab   WBC 2.6 4.0 - 11.0 10e9/L L 51   RBC Count 3.80 3.8 - 5.2 10e12/L  51   Hemoglobin 10.4 11.7 - 15.7 g/dL L 51   Hematocrit 33.0 35.0 - 47.0 % L 51   MCV 87 78 - 100 fl  51   MCH 27.4 26.5 - 33.0 pg  51   MCHC 31.5 31.5 - 36.5 g/dL  51   RDW 18.8 10.0 - 15.0 % H 51   Platelet Count 294 150 - 450 10e9/L  51   Diff Method Automated Method   51   % Neutrophils 40.6 %  51   % Lymphocytes 43.4 %  51   % Monocytes 12.5 %  51   % Eosinophils 2.7 %  51   % Basophils 0.8 %  51   % Immature Granulocytes 0.0 %  51   Nucleated RBCs 0 0 /100  51   Absolute Neutrophil 1.0 1.6 - 8.3 10e9/L L 51   Absolute Lymphocytes 1.1 0.8 - 5.3 10e9/L  51   Absolute Monocytes 0.3 0.0 - 1.3 10e9/L  51   Absolute Eosinophils 0.1 0.0 - 0.7 10e9/L  51   Absolute Basophils 0.0 0.0 - 0.2 10e9/L  51   Abs Immature Granulocytes 0.0 0 - 0.4 10e9/L  51   Absolute Nucleated RBC 0.0   51            Reticulocyte Count [[id number]] (Abnormal)  Resulted: 10/01/17 1450, Result status: Final result    Ordering provider: Lizzette Garcia MD  10/01/17 1305 Resulting lab: [facility]    Specimen Information    Type Source Collected On   Blood  10/01/17 1435          Components       Value Reference Range Flag Lab   % Retic 2.6 0.5 - 2.0 % H 51   Absolute Retic 99.6 25 - 95 10e9/L H 51            CBC with platelets differential [[id number]] (Abnormal)  Resulted: 10/01/17 0940, Result status: Final result    Ordering provider: Shahla Swann MD  10/01/17 8136 Resulting lab: [facility]    Specimen Information    Type Source Collected On   Blood  10/01/17 0871          Components       Value Reference Range Flag Lab   WBC  1.9 4.0 - 11.0 10e9/L L 51   RBC Count 3.59 3.8 - 5.2 10e12/L L 51   Hemoglobin 9.6 11.7 - 15.7 g/dL L 51   Hematocrit 31.1 35.0 - 47.0 % L 51   MCV 87 78 - 100 fl  51   MCH 26.7 26.5 - 33.0 pg  51   MCHC 30.9 31.5 - 36.5 g/dL L 51   RDW 18.8 10.0 - 15.0 % H 51   Platelet Count 249 150 - 450 10e9/L  51   Diff Method Automated Method   51   % Neutrophils 28.9 %  51   % Lymphocytes 42.9 %  51   % Monocytes 21.5 %  51   % Eosinophils 5.2 %  51   % Basophils 1.0 %  51   % Immature Granulocytes 0.5 %  51   Nucleated RBCs 0 0 /100  51   Absolute Neutrophil 0.6 1.6 - 8.3 10e9/L L 51   Absolute Lymphocytes 0.8 0.8 - 5.3 10e9/L  51   Absolute Monocytes 0.4 0.0 - 1.3 10e9/L  51   Absolute Eosinophils 0.1 0.0 - 0.7 10e9/L  51   Absolute Basophils 0.0 0.0 - 0.2 10e9/L  51   Abs Immature Granulocytes 0.0 0 - 0.4 10e9/L  51   Absolute Nucleated RBC 0.0   51   Platelet Estimate Confirming automated cell count   51            CBC with platelets differential [[id number]] (Abnormal)  Resulted: 09/30/17 1447, Result status: Final result    Ordering provider: Shahla Swann MD  09/30/17 1241 Resulting lab: [facility]    Specimen Information    Type Source Collected On   Blood  09/30/17 1415          Components       Value Reference Range Flag Lab   WBC 1.6 4.0 - 11.0 10e9/L L 51   RBC Count 3.55 3.8 - 5.2 10e12/L L 51   Hemoglobin 9.6 11.7 - 15.7 g/dL L 51   Hematocrit 30.8 35.0 - 47.0 % L 51   MCV 87 78 - 100 fl  51   MCH 27.0 26.5 - 33.0 pg  51   MCHC 31.2 31.5 - 36.5 g/dL L 51   RDW 18.6 10.0 - 15.0 % H 51   Platelet Count 217 150 - 450 10e9/L  51   Diff Method Automated Method   51   % Neutrophils 36.1 %  51   % Lymphocytes 37.3 %  51   % Monocytes 21.7 %  51   % Eosinophils 3.7 %  51   % Basophils 1.2 %  51   % Immature Granulocytes 0.0 %  51   Nucleated RBCs 0 0 /100  51   Absolute Neutrophil 0.6 1.6 - 8.3 10e9/L L 51   Absolute Lymphocytes 0.6 0.8 - 5.3 10e9/L L 51   Absolute Monocytes 0.4 0.0  - 1.3 10e9/L  51   Absolute Eosinophils 0.1 0.0 - 0.7 10e9/L  51   Absolute Basophils 0.0 0.0 - 0.2 10e9/L  51   Abs Immature Granulocytes 0.0 0 - 0.4 10e9/L  51   Absolute Nucleated RBC 0.0   51   Platelet Estimate Confirming automated cell count   51            Basic metabolic panel [[id number]]  Resulted: 09/30/17 1436, Result status: Final result    Ordering provider: Shahla Swann MD  09/30/17 1241 Resulting lab: [facility]    Specimen Information    Type Source Collected On   Blood  09/30/17 1415          Components       Value Reference Range Flag Lab   Sodium 139 133 - 144 mmol/L  51   Potassium 4.4 3.4 - 5.3 mmol/L  51   Chloride 106 94 - 109 mmol/L  51   Carbon Dioxide 27 20 - 32 mmol/L  51   Anion Gap 6 3 - 14 mmol/L  51   Glucose 89 70 - 99 mg/dL  51   Urea Nitrogen 8 7 - 30 mg/dL  51   Creatinine 0.67 0.52 - 1.04 mg/dL  51   GFR Estimate >90 >60 mL/min/1.7m2  51   Comment:  Non  GFR Calc   GFR Estimate If Black >90 >60 mL/min/1.7m2  51   Comment:  African American GFR Calc   Calcium 8.8 8.5 - 10.1 mg/dL  51            Magnesium [[id number]]  Resulted: 09/30/17 1436, Result status: Final result    Ordering provider: Shahla Swann MD  09/30/17 1244 Resulting lab: [facility]    Specimen Information    Type Source Collected On   Blood  09/30/17 1415          Components       Value Reference Range Flag Lab   Magnesium 2.3 1.6 - 2.3 mg/dL  51            Phosphorus [[id number]]  Resulted: 09/30/17 1436, Result status: Final result    Ordering provider: Shahla Swann MD  09/30/17 1244 Resulting lab: [facility]    Specimen Information    Type Source Collected On   Blood  09/30/17 1415          Components       Value Reference Range Flag Lab   Phosphorus 3.8 2.5 - 4.5 mg/dL  51            Testing Performed By     Lab - Abbreviation Name Director Address Valid Date Range    45  [id number]  MISYS Unknown Unknown 01/28/02 0000 - Present    51 - Unknown [facility] Unknown [address] [id number] 12/31/14 1010 - Present    75 - Unknown [facility] Unknown [address] [facility] [id number] 01/15/15 1019 - Present    88 - Unknown [facility] Unknown Unknown 10/30/02 0000 - Present            Encounter-Level Documents:     There are no encounter-level documents.      Order-Level Documents:     There are no order-level documents.

## 2017-09-25 NOTE — IP AVS SNAPSHOT
[address] [address]    Phone:  [phone]                                       After Visit Summary   9/25/2017    Lynne Llanos    MRN: [id number]           After Visit Summary Signature Page     I have received my discharge instructions, and my questions have been answered. I have discussed any challenges I see with this plan with the nurse or doctor.    ..........................................................................................................................................  Patient/Patient Representative Signature      ..........................................................................................................................................  Patient Representative Print Name and Relationship to Patient    ..................................................               ................................................  Date                                            Time    ..........................................................................................................................................  Reviewed by Signature/Title    ...................................................              ..............................................  Date                                                            Time

## 2017-09-25 NOTE — CONSULTS
[facility]  Neurology Consultation    Patient Name:  Lynne Llanos  MRN:  [id number]    :  1970  Date of Service:  2017  Primary care provider:  No Ref-Primary, Physician      Neurology consultation service was asked to see Lynne Llanos by ANGELINA Sheridan CNP to evaluate probable Guillan-[place].    History of Present Illness:   Ms. Llanos is a 47 year old female with no significant past medical history who presents as a transfer from [facility] for a 3 week history of pain, numbness and weakness in her arm and legs, speech and swallowing difficulties. She states she awoke on  and noticed numbness of her hands and feet followed by a shooting pain from her fingers on her right hand to her elbow. She states numbness in her feet has since extended up to her mid calf. A few days after initial onset she says she began experiencing weakness in her legs with difficulty walking, right-sided facial droop and difficulty closing her right eye fully. She says she eventually developed the inability to walk to the bathroom and eventually started using a cane. She reports has had a few episodes of functional incontinence due to not being able to reach the bathroom in time. She states she noted difficulty speaking clearly and swallowing about 2 weeks prior to presentation, but no shortness of breath. She reports having had a non-productive, chronic cough that developed about a month before the symptoms started but denies fevers, chills, night sweats, weight loss, rashes, nausea, vomiting or diarrhea.     Due to concern for stroke she presented to the ED were she underwent a MRI of brain, cervical, and lumbar spine, and a lumbar puncture. Due to concern for possible Guillan-[place] syndrome supported by clinical history and CSF with albuminocytologic dissociation she was treated with 5 days of IVIG with some however incomplete improvement in most of her symptoms.     The  imaging to evaluate her spine incidentally demonstrated a pelvic mass which has been further characterized by dedicated imaging which is concerning for malignancy, and subsequently raised suspicion for a paraneoplastic etiology of her weakness. She recalls missing her period for the last 4 months but assumed she was perimenopausal. She denies new sexual partners, exposure to STDs, pelvic pain or pressure. Due to the need surgical staging procedure and management of the suspected paraneoplastic GBS symptoms she was transferred to the [facility].     ROS  A 10-point ROS was performed as per HPI. Pertinent negatives: diplopia, dyspnea.     PMH  Past Medical History:   Diagnosis Date     Guillain Barré syndrome Probable perineoplastic      Obesity      Pelvic mass      Past Surgical History:   Procedure Laterality Date     FOOT SURGERY Right      Right knee surgery x 4       TUBAL LIGATION       Medications   Prescriptions Prior to Admission   Medication Sig Dispense Refill Last Dose     HYDROmorphone, STANDARD CONC, (DILAUDID) 1 MG/ML LIQD liquid Inject 1 mg into the vein every 2 hours as needed for moderate to severe pain   9/25/2017 at 1453     GABAPENTIN PO Take 900 mg by mouth 2 times daily   9/24/2017 at 2100     RaNITidine HCl (ZANTAC PO) Take 150 mg by mouth 2 times daily   9/24/2017 at 2100     magnesium hydroxide (MILK OF MAGNESIA) 400 MG/5ML suspension Take 30 mLs by mouth 2 times daily   9/24/2017 at 2100     senna-docusate (SENOKOT-S;PERICOLACE) 8.6-50 MG per tablet Take 2 tablets by mouth 2 times daily   9/24/2017 at 2100     enoxaparin (LOVENOX) 40 MG/0.4ML injection Inject 40 mg Subcutaneous daily   9/24/2017 at 2100     hydrocortisone 2.5 % cream Apply topically 2 times daily   9/24/2017 at 2100     Allergies  Not on File    Social History  Social history has been reviewed.    Family History    Family history has been reviewed.    Physical Examination   Vitals: BP (!) 143/95  Pulse 83   Temp 97.2  F (36.2  C) (Oral)  Resp 16  SpO2 96%  General: Adult, in NAD, cooperative  HEENT: NC/AT, no icterus, R conjunctiva injected, no exudate, op pink and moist  Cardiac: RRR no M  Chest: CTAB no w/c/r  Abdomen: S/NT/ND  Extremities: No LE swelling.  Skin: No rash or lesion.   Psych: Mood pleasant, affect congruent  Neuro:  Mental status: Awake, alert, attentive, oriented to self, time, place, and circumstance. Speech is fluent but slow.  Cranial nerves: Eyes conjugate, PERRL however sluggish, EOM intact but smooth pursuits slowed, face symmetric at rest, asymmetric smile R less activated, bilateral mild ptosis and weak orb oc, R eye not blinking when L does, facial sensation intact, shoulder shrug strong, tongue midline, uvula deviates R, no dysarthria.   Motor: Tone within normal. RUE shoulder abd 4+, elbow flex 4+, ex 5, finger flex 4+, LUE shoulder abd 4-, elbow flex 4-, ex 5, finger flex 4-, BLE hip flex 4, ankle dorsi/plantarflex 5. No atrophy or twitches.   Reflexes: Areflexic patellar, achilles, biceps despite facilitation maneuvers. Toes mute.  Sensory: Decreased sensation to light touch from midcalf down bilaterally, uppers intact to light touch. Hyperalgesia to babinskis and manipulation of feet.  Coordination: FNF slow but no dysmetria, HTS & RAMAN slow  Gait: Not tested    Investigations   IMAGING/LABS  Performed at [facility]:  - CT of chest, abdomen and pelvis: Complex cystic mass of left adnexa, bilateral lower lobe pneumonia/subsegmental atelectasis, spinal stenosis at C6-7, and degenerative disc disease at multiple levels.  - Transvaginal U/S found 5x5x4 cm fibroid, 9x8x12 left adnexal mass and normal right ovary.    - Lumbar puncture: CSF protein elevated at 174, CSF RBC 72, CSF nucleated cells 3.6/mcL, 81% lymphocytes. Cultures negative. Lyme negative  - Lyme titer negative,  119    Impression  3 week history of progressive, sensory and motor neuropathy with bulbar symptoms and  areflexia as well as albuminocytologic dissociation of CSF sample suggestive of Guillan-[place] Syndrome, in the context of possible gynecologic malignancy, thus concerning for paraneoplastic etiology    Recommendations  - NIFs and FVCs q8 hours, consider intubation if NIF >-20, or FVC <10-15 ml/kg ([id number])  - EMG on Tuesday, will assist with coordination with tech/attending  - q4 hour neuro checks     Thank you for involving Neurology in the care of Lynne Llanos.  Please do not hesitate to call with questions/concerns (consult pager [phone]).      I, Dale Estrada, MS3 functioned as a scribe in the preparation of this note.    Patient was discussed with staff, Dr. Horton, and will be seen in the am.     Yumiko Sparks MD  Neurology PGY-2  [phone]      Neurology Staff Addendum  I have seen and evaluated the patient on 9-26-17 and discussed with the resident team and agree with the documentation.    At this point uncertain if Guillian [place] is purely paraneoplastic meaning the antibodies are attacking a similar epitope in the patients nervous system as her malignancy (these paraneoplastic syndromes are more frequently pure motor which is not the clinical case here) or whether the guillian barre occurred in parellel related to the increased inflammatory response from the malignancy. The fact that her weakness and numbness have improved from courses of IVIG would suggest the later.  EMG/NCS should be helpful. Will comment on results.  Would hold off on further IVIG or Plasma Exchange therapy until malignancy is worked up and addressed or if symptoms progress.  Neurology will follow.      MARY KATE HORTON MD

## 2017-09-25 NOTE — H&P
[facility] History and Physical    Lynne Llanos MRN# [id number]   Age: 47 year old YOB: 1970     Date of Admission:  9/25/2017               Chief Complaint:   Pelvic mass   119  Possible paraneoplastic Guillian [place] syndrome          History of Present Illness:   This patient is a 47 year old G 4 P 2-0-2-2 female who was transferred here from [facility] in [facility] for possible paraneoplastic Guillain [place] syndrome with a pelvic mass and an elevated . She notes was doing well until about 9/6/17 when she woke up with some numbness sensation to her hands and feet. She then developed sharp pain sensation shooting from her fingers to her elbow. She was more numb on her left leg compared to her right leg. A few days later she started experiencing weakness and right facial droop. She was evaluated in the [place] [facility] a couple of times and underwent CT of the brain, cervical and lumbar spine. She was referred to [facility] for further work up and evaluation. She was seen by medicine and neurology team. MRI of brain, cervical, thoracic, and lumbar spine completed, LP with CSF studies to eval cell count, protein, glucose, stain and culture and completed a 5 day course of IVIG. Pelvic mass was seen on MRI so a Ct A/P was completed and revealed a left ovarian mass 6x9x10 cm and a posterior fundal fibroid measuring 4 cm. Patient notes she had not had menses over the last 4 months and she assumed it was perimenopausal. No new sexual partners, no exposure to STDs, no pelvic pain or pressure, no change in bowel or bladder function, no early satiety and no weight loss or gain. She had a non-productive, chronic cough that developed about a month before the symptoms started but hasn't experience fevers, chills, night sweats, weight loss, rashes, nausea, vomiting or diarrhea.              Past Medical History:     Past Medical History:   Diagnosis Date     Guillain Barré syndrome  Probable perineoplastic      Obesity      Pelvic mass             Past Surgical History:      Past Surgical History:   Procedure Laterality Date     FOOT SURGERY Right      Right knee surgery x 4       TUBAL LIGATION       Right ovarian surgery possibly cystectomy - Patient does not remember details          Social History:     Social History   Substance Use Topics     Smoking status: No     Smokeless tobacco: No     Alcohol use No     Denies alcohol, tobacco or drug use. She is originally from [place]. She is  for about 10 years. She moved to [place] with a friend to work and is planning to move back to [place] in 3 weeks.          Family History:     Maternal grandmother with hx of malignancy of unknown origin (Had brain and lung masses)  Denies family history of breast, colon, uterine, or ovarian cancer         Allergies:     Allergies   Allergen Reactions     Morphine Itching            Medications:     Current Facility-Administered Medications   Medication     ranitidine (ZANTAC) tablet 150 mg     oxyCODONE (ROXICODONE) IR tablet 5-10 mg     calcium carbonate (TUMS) chewable tablet 500 mg     magnesium hydroxide (MILK OF MAGNESIA) suspension 30 mL     enoxaparin (LOVENOX) injection 40 mg     naloxone (NARCAN) injection 0.1-0.4 mg     senna-docusate (SENOKOT-S;PERICOLACE) 8.6-50 MG per tablet 1-2 tablet     polyethylene glycol (MIRALAX/GLYCOLAX) Packet 17 g     ondansetron (ZOFRAN-ODT) ODT tab 4 mg    Or     ondansetron (ZOFRAN) injection 4 mg     prochlorperazine (COMPAZINE) injection 5-10 mg    Or     prochlorperazine (COMPAZINE) tablet 5-10 mg    Or     prochlorperazine (COMPAZINE) Suppository 25 mg     gabapentin (NEURONTIN) capsule 600 mg     HYDROmorphone (PF) (DILAUDID) injection 0.5 mg            Review of Systems:     CONSTITUTIONAL:  No fever or chills. +Hot flashes  RESPIRATORY:  +Cough with occasional SOB and chest tightness that is relieved by dilaudid and sx have improved  CARDIOVASCULAR:  NO CP or  "heart palp  GASTROINTESTINAL: +GERD. Recent constipation s/p laxatives and LBM yesterday. No N/V, is eating and drinking well  GENITOURINARY:  No abnormal vaginal discharge. Hx of painful menses but no menses x 4 months. Notes some spotting over the past couple of days         Physical Exam:     Vitals:    09/25/17 1505 09/25/17 1736   BP: (!) 143/95    Pulse: 83    Resp: 16    Temp: 97.2  F (36.2  C)    TempSrc: Oral    SpO2: 96%    Height:  1.753 m (5' 9\")     General: Alert and oriented  CV: HR regular  Resp: LS clear   Abdomen: Soft, NT, BS+  : exam completed by Dr Haider. External vulva and urethra within normal limits. Speculum exam cervix without lesions small amount of blood noted to the vaginal vault  Extremities: No edema  Neuro exam: Alert and oriented. Speech is appropriate but slow at times. CN II - VII intact.          Data:     CT Cervical Spine 09/10/17: No acute fracture. Straightening of the cervical lordosis with mild reversal centered at C4-5, mild anterolisthesis, Central disc protrusion with moderate spinal canal stenosis mild left neural forminal stenosis C6-7, Mild to moderate spinal canal stenosis mild right and mild left to moderate left neural foraminal stenosis C5-6, Mild spinal canal stenosis mild right and mild to moderate left neural foraminal stenosis C4-5, Moderate left neural foraminal stenosis C3-4.  CT head 9/10/17: No acute intracranial hemorrhage, mass or loss of gray-white differentiation  CT Lumbar spine 9/10/17: No acute fracture, disc degeneration, L5-S1 moderate to severe bilateral neural foraminal stenosis and possible impingement of the exiting L5 nerve root    Performed at [facility]:  -MRI Brain - No acute intracranial abnormality  -MRI Cervical Spine: Spinal canal stenosis at the level of C6-7 is caused by left paracentral disc herniation, cervical degenerative disc disease is most pronounced at levels of C4-5, C5-6, and C6-7. There is loss of normal cervical " lordosis.  -MRI Thoracic No evidence of spinal cord compression or neural exit narrowing  -MRI Lumbar: Enhancing pelvic mass, multilevel degenerative disc disease, hypertrophic facet degenerative joint disease L5-S1 neural exit narrowing.  - CT of chest, abdomen and pelvis: Complex cystic mass of left adnexa, bilateral lower lobe pneumonia/subsegmental atelectasis, spinal stenosis at C6-7, and degenerative disc diseaes at multiple levels.  - Lumbar puncture: CSF protein elevated at 174, CSF RBC 72, 81% lymphocytes Cultures negative.   - Transvaginal U/S found 5x5x4 cm fibroid, 9x8x12 left adnexal mass and normal right ovary. Endometrial stripe 1.5 cm    -Lyme titer negative, Ca125 elevated at 119, Total protein elevated at 8.4, IgA 225          Assessment and Plan:   Assessment: 47 year old female with possible paraneoplastic [place] syndrome transferred here d/t pelvic mass and gyn onc care.     Plan:  Dz: 10-12 cm left pelvic mass, 5 cm fibroid, 1.5 cm endometrial stripe. Plan for surgical removal of mass and D&C on Wednesday.   FEN: Regular diet. CMP and Mag. Hyponatremia at outside hospital.   Pain: PRN Oxycodone, dilaudid. Neurontin 600 mg TID  Heme: CBC pending. Lovenox 40 mg sc daily for prophylaxis  CV: NI  Pulm: Monitor negative inspiratory force/FVC every 8 hours per neurology recommendations  GI: Laxatives. PRN antiemetics. Zantac 150 mg PO BID. Tums PRN.  : Commode to void  ID: CBC pending. Afebrile.  Endocrine: NI  Psych/Neuro: Neurology consult, appreciate care. S/P 5 day course of IVIG with small improvement in sx. OT/PT consult  PPX: Lovenox PPX daily. Patient reports SCDs too painful. Encourage OOB.     Crista Bennett, CNP  9/25/2017 1:25 PM

## 2017-09-26 ENCOUNTER — ANESTHESIA EVENT (OUTPATIENT)
Dept: SURGERY | Facility: CLINIC | Age: 47
DRG: 742 | End: 2017-09-26
Payer: COMMERCIAL

## 2017-09-26 PROBLEM — E66.9 OBESITY: Status: ACTIVE | Noted: 2017-09-26

## 2017-09-26 PROBLEM — N94.89 ADNEXAL MASS: Status: ACTIVE | Noted: 2017-09-26

## 2017-09-26 PROBLEM — R29.810 FACIAL DROOP: Status: ACTIVE | Noted: 2017-09-26

## 2017-09-26 PROBLEM — G62.9 NEUROPATHY: Status: ACTIVE | Noted: 2017-09-26

## 2017-09-26 PROBLEM — E87.1 HYPONATREMIA: Status: ACTIVE | Noted: 2017-09-26

## 2017-09-26 PROBLEM — R97.1 ELEVATED CA-125: Status: ACTIVE | Noted: 2017-09-26

## 2017-09-26 PROBLEM — G61.0 GUILLAIN-BARRE SYNDROME (H): Status: ACTIVE | Noted: 2017-09-26

## 2017-09-26 PROBLEM — N95.1 PERIMENOPAUSAL: Status: ACTIVE | Noted: 2017-09-26

## 2017-09-26 PROBLEM — M62.81 GENERALIZED MUSCLE WEAKNESS: Status: ACTIVE | Noted: 2017-09-26

## 2017-09-26 LAB
ABO + RH BLD: NORMAL
ABO + RH BLD: NORMAL
AFP SERPL-MCNC: 2.1 UG/L (ref 0–8)
ANION GAP SERPL CALCULATED.3IONS-SCNC: 6 MMOL/L (ref 3–14)
BLD GP AB SCN SERPL QL: NORMAL
BLOOD BANK CMNT PATIENT-IMP: NORMAL
BUN SERPL-MCNC: 12 MG/DL (ref 7–30)
CALCIUM SERPL-MCNC: 9.3 MG/DL (ref 8.5–10.1)
CANCER AG125 SERPL-ACNC: 61 U/ML (ref 0–30)
CEA SERPL-MCNC: <0.5 UG/L (ref 0–2.5)
CHLORIDE SERPL-SCNC: 101 MMOL/L (ref 94–109)
CO2 SERPL-SCNC: 28 MMOL/L (ref 20–32)
CREAT SERPL-MCNC: 0.64 MG/DL (ref 0.52–1.04)
ERYTHROCYTE [DISTWIDTH] IN BLOOD BY AUTOMATED COUNT: 18.8 % (ref 10–15)
GFR SERPL CREATININE-BSD FRML MDRD: >90 ML/MIN/1.7M2
GLUCOSE SERPL-MCNC: 96 MG/DL (ref 70–99)
HCT VFR BLD AUTO: 34.4 % (ref 35–47)
HGB BLD-MCNC: 11.1 G/DL (ref 11.7–15.7)
INTERPRETATION ECG - MUSE: NORMAL
LDH SERPL L TO P-CCNC: 206 U/L (ref 81–234)
MCH RBC QN AUTO: 27.3 PG (ref 26.5–33)
MCHC RBC AUTO-ENTMCNC: 32.3 G/DL (ref 31.5–36.5)
MCV RBC AUTO: 85 FL (ref 78–100)
PLATELET # BLD AUTO: 188 10E9/L (ref 150–450)
POTASSIUM SERPL-SCNC: 4.1 MMOL/L (ref 3.4–5.3)
RBC # BLD AUTO: 4.07 10E12/L (ref 3.8–5.2)
SODIUM SERPL-SCNC: 135 MMOL/L (ref 133–144)
SPECIMEN EXP DATE BLD: NORMAL
WBC # BLD AUTO: 2.3 10E9/L (ref 4–11)

## 2017-09-26 PROCEDURE — 86301 IMMUNOASSAY TUMOR CA 19-9: CPT | Performed by: STUDENT IN AN ORGANIZED HEALTH CARE EDUCATION/TRAINING PROGRAM

## 2017-09-26 PROCEDURE — 25000128 H RX IP 250 OP 636: Performed by: STUDENT IN AN ORGANIZED HEALTH CARE EDUCATION/TRAINING PROGRAM

## 2017-09-26 PROCEDURE — 36592 COLLECT BLOOD FROM PICC: CPT | Performed by: OBSTETRICS & GYNECOLOGY

## 2017-09-26 PROCEDURE — 83520 IMMUNOASSAY QUANT NOS NONAB: CPT | Performed by: STUDENT IN AN ORGANIZED HEALTH CARE EDUCATION/TRAINING PROGRAM

## 2017-09-26 PROCEDURE — 40000275 ZZH STATISTIC RCP TIME EA 10 MIN

## 2017-09-26 PROCEDURE — 36415 COLL VENOUS BLD VENIPUNCTURE: CPT | Performed by: STUDENT IN AN ORGANIZED HEALTH CARE EDUCATION/TRAINING PROGRAM

## 2017-09-26 PROCEDURE — 84702 CHORIONIC GONADOTROPIN TEST: CPT | Performed by: STUDENT IN AN ORGANIZED HEALTH CARE EDUCATION/TRAINING PROGRAM

## 2017-09-26 PROCEDURE — 25000132 ZZH RX MED GY IP 250 OP 250 PS 637: Performed by: STUDENT IN AN ORGANIZED HEALTH CARE EDUCATION/TRAINING PROGRAM

## 2017-09-26 PROCEDURE — 86850 RBC ANTIBODY SCREEN: CPT | Performed by: NURSE PRACTITIONER

## 2017-09-26 PROCEDURE — 80048 BASIC METABOLIC PNL TOTAL CA: CPT | Performed by: OBSTETRICS & GYNECOLOGY

## 2017-09-26 PROCEDURE — 25000132 ZZH RX MED GY IP 250 OP 250 PS 637: Performed by: OBSTETRICS & GYNECOLOGY

## 2017-09-26 PROCEDURE — 86900 BLOOD TYPING SEROLOGIC ABO: CPT | Performed by: NURSE PRACTITIONER

## 2017-09-26 PROCEDURE — 82378 CARCINOEMBRYONIC ANTIGEN: CPT | Performed by: STUDENT IN AN ORGANIZED HEALTH CARE EDUCATION/TRAINING PROGRAM

## 2017-09-26 PROCEDURE — 25000132 ZZH RX MED GY IP 250 OP 250 PS 637: Performed by: NURSE PRACTITIONER

## 2017-09-26 PROCEDURE — 94150 VITAL CAPACITY TEST: CPT

## 2017-09-26 PROCEDURE — 25000128 H RX IP 250 OP 636: Performed by: NURSE PRACTITIONER

## 2017-09-26 PROCEDURE — 86901 BLOOD TYPING SEROLOGIC RH(D): CPT | Performed by: NURSE PRACTITIONER

## 2017-09-26 PROCEDURE — 36592 COLLECT BLOOD FROM PICC: CPT | Performed by: NURSE PRACTITIONER

## 2017-09-26 PROCEDURE — 99232 SBSQ HOSP IP/OBS MODERATE 35: CPT | Mod: GC | Performed by: OBSTETRICS & GYNECOLOGY

## 2017-09-26 PROCEDURE — 82105 ALPHA-FETOPROTEIN SERUM: CPT | Performed by: STUDENT IN AN ORGANIZED HEALTH CARE EDUCATION/TRAINING PROGRAM

## 2017-09-26 PROCEDURE — 83615 LACTATE (LD) (LDH) ENZYME: CPT | Performed by: OBSTETRICS & GYNECOLOGY

## 2017-09-26 PROCEDURE — 86304 IMMUNOASSAY TUMOR CA 125: CPT | Performed by: STUDENT IN AN ORGANIZED HEALTH CARE EDUCATION/TRAINING PROGRAM

## 2017-09-26 PROCEDURE — 85027 COMPLETE CBC AUTOMATED: CPT | Performed by: OBSTETRICS & GYNECOLOGY

## 2017-09-26 PROCEDURE — 40000802 ZZH SITE CHECK

## 2017-09-26 PROCEDURE — 12000008 ZZH R&B INTERMEDIATE UMMC

## 2017-09-26 RX ORDER — OXYCODONE HYDROCHLORIDE 5 MG/1
5-10 TABLET ORAL EVERY 4 HOURS PRN
Status: DISCONTINUED | OUTPATIENT
Start: 2017-09-26 | End: 2017-09-27

## 2017-09-26 RX ORDER — SODIUM CHLORIDE, SODIUM LACTATE, POTASSIUM CHLORIDE, CALCIUM CHLORIDE 600; 310; 30; 20 MG/100ML; MG/100ML; MG/100ML; MG/100ML
INJECTION, SOLUTION INTRAVENOUS CONTINUOUS
Status: DISCONTINUED | OUTPATIENT
Start: 2017-09-26 | End: 2017-09-26

## 2017-09-26 RX ORDER — HYDROMORPHONE HYDROCHLORIDE 1 MG/ML
.3-.5 INJECTION, SOLUTION INTRAMUSCULAR; INTRAVENOUS; SUBCUTANEOUS EVERY 6 HOURS PRN
Status: DISCONTINUED | OUTPATIENT
Start: 2017-09-26 | End: 2017-09-27

## 2017-09-26 RX ORDER — GABAPENTIN 300 MG/1
900 CAPSULE ORAL 3 TIMES DAILY
Status: DISCONTINUED | OUTPATIENT
Start: 2017-09-26 | End: 2017-09-29

## 2017-09-26 RX ORDER — IBUPROFEN 200 MG
400-600 TABLET ORAL EVERY 6 HOURS PRN
Status: DISCONTINUED | OUTPATIENT
Start: 2017-09-26 | End: 2017-09-27

## 2017-09-26 RX ORDER — SODIUM CHLORIDE, SODIUM LACTATE, POTASSIUM CHLORIDE, CALCIUM CHLORIDE 600; 310; 30; 20 MG/100ML; MG/100ML; MG/100ML; MG/100ML
INJECTION, SOLUTION INTRAVENOUS CONTINUOUS
Status: DISCONTINUED | OUTPATIENT
Start: 2017-09-27 | End: 2017-09-27

## 2017-09-26 RX ORDER — ACETAMINOPHEN 325 MG/1
650 TABLET ORAL EVERY 4 HOURS PRN
Status: DISCONTINUED | OUTPATIENT
Start: 2017-09-26 | End: 2017-09-27

## 2017-09-26 RX ADMIN — IBUPROFEN 600 MG: 200 TABLET, FILM COATED ORAL at 15:41

## 2017-09-26 RX ADMIN — HYDROMORPHONE HYDROCHLORIDE 0.5 MG: 1 INJECTION, SOLUTION INTRAMUSCULAR; INTRAVENOUS; SUBCUTANEOUS at 09:09

## 2017-09-26 RX ADMIN — GABAPENTIN 900 MG: 300 CAPSULE ORAL at 19:42

## 2017-09-26 RX ADMIN — OXYCODONE HYDROCHLORIDE 10 MG: 5 TABLET ORAL at 21:20

## 2017-09-26 RX ADMIN — ACETAMINOPHEN 650 MG: 325 TABLET, FILM COATED ORAL at 13:37

## 2017-09-26 RX ADMIN — MAGNESIUM HYDROXIDE 30 ML: 400 SUSPENSION ORAL at 09:17

## 2017-09-26 RX ADMIN — CARBOXYMETHYLCELLULOSE SODIUM 1 DROP: 5 SOLUTION/ DROPS OPHTHALMIC at 19:42

## 2017-09-26 RX ADMIN — CARBOXYMETHYLCELLULOSE SODIUM 1 DROP: 5 SOLUTION/ DROPS OPHTHALMIC at 03:37

## 2017-09-26 RX ADMIN — GABAPENTIN 900 MG: 300 CAPSULE ORAL at 14:09

## 2017-09-26 RX ADMIN — OXYCODONE HYDROCHLORIDE 10 MG: 5 TABLET ORAL at 03:22

## 2017-09-26 RX ADMIN — OXYCODONE HYDROCHLORIDE 10 MG: 5 TABLET ORAL at 07:27

## 2017-09-26 RX ADMIN — HYDROCORTISONE: 1 CREAM TOPICAL at 19:42

## 2017-09-26 RX ADMIN — HYDROXYZINE HYDROCHLORIDE 25 MG: 25 TABLET ORAL at 15:41

## 2017-09-26 RX ADMIN — HYDROMORPHONE HYDROCHLORIDE 0.5 MG: 1 INJECTION, SOLUTION INTRAMUSCULAR; INTRAVENOUS; SUBCUTANEOUS at 02:19

## 2017-09-26 RX ADMIN — SENNOSIDES AND DOCUSATE SODIUM 2 TABLET: 8.6; 5 TABLET ORAL at 09:16

## 2017-09-26 RX ADMIN — HYDROMORPHONE HYDROCHLORIDE 0.3 MG: 1 INJECTION, SOLUTION INTRAMUSCULAR; INTRAVENOUS; SUBCUTANEOUS at 19:55

## 2017-09-26 RX ADMIN — OXYCODONE HYDROCHLORIDE 10 MG: 5 TABLET ORAL at 16:23

## 2017-09-26 RX ADMIN — HYDROMORPHONE HYDROCHLORIDE 0.5 MG: 1 INJECTION, SOLUTION INTRAMUSCULAR; INTRAVENOUS; SUBCUTANEOUS at 05:39

## 2017-09-26 RX ADMIN — CARBOXYMETHYLCELLULOSE SODIUM 1 DROP: 5 SOLUTION/ DROPS OPHTHALMIC at 14:04

## 2017-09-26 RX ADMIN — GABAPENTIN 900 MG: 300 CAPSULE ORAL at 09:16

## 2017-09-26 RX ADMIN — OXYCODONE HYDROCHLORIDE 10 MG: 5 TABLET ORAL at 12:18

## 2017-09-26 RX ADMIN — RANITIDINE HYDROCHLORIDE 150 MG: 150 TABLET, FILM COATED ORAL at 19:42

## 2017-09-26 RX ADMIN — HYDROMORPHONE HYDROCHLORIDE 0.5 MG: 1 INJECTION, SOLUTION INTRAMUSCULAR; INTRAVENOUS; SUBCUTANEOUS at 00:33

## 2017-09-26 RX ADMIN — HYDROXYZINE HYDROCHLORIDE 25 MG: 25 TABLET ORAL at 18:20

## 2017-09-26 RX ADMIN — ENOXAPARIN SODIUM 40 MG: 40 INJECTION SUBCUTANEOUS at 19:42

## 2017-09-26 RX ADMIN — RANITIDINE HYDROCHLORIDE 150 MG: 150 TABLET, FILM COATED ORAL at 09:16

## 2017-09-26 ASSESSMENT — PAIN DESCRIPTION - DESCRIPTORS
DESCRIPTORS: ACHING
DESCRIPTORS: ACHING
DESCRIPTORS: NUMBNESS
DESCRIPTORS: ACHING;CONSTANT
DESCRIPTORS: ACHING
DESCRIPTORS: ACHING
DESCRIPTORS: NUMBNESS
DESCRIPTORS: ACHING;NUMBNESS;TINGLING
DESCRIPTORS: ACHING;NUMBNESS;TIGHTNESS
DESCRIPTORS: ACHING

## 2017-09-26 NOTE — DISCHARGE SUMMARY
Gynecologic Oncology Discharge Summary    Lynne Llanos  [id number]    Admit Date: 9/25/2017  Discharge Date: 10/4/2017   Admitting Provider: Dr. Cordero  Discharge Provider: Dr. 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  Fascial weakness with right facial droop  Neuropathy in BLE/BUE  Muscle weakness  Hyponatremia; resolved  Perimenopausal, amenorrhea x 4 months  PICC line in place  Neutropenia    Discharge Diagnosis:  As above now s/p below stated procedures  Postoperative status  Leukopenia, improving  Endometriosis  Supsected diverticular abscess  Deconditioned state  History of hepatitis B infection on serology    Patient Active Problem List   Diagnosis     Pelvic mass     Guillain-[place] syndrome (H)     Elevated CA-125     Neuropathy (H)     Facial droop     Hyponatremia     Obesity     Perimenopausal     Adnexal mass     S/P hysterectomy     Procedures:   EKG  CXR  Serial FVC/negative inspiratory force  EMG  General anesthesia  Modified radical hysterectomy, Bilateral Salpingo-Oophrectomy, cystoscopy, proctoscopy, pelvic washings, bilateral ureterolysis.    Ni catheter placement and removal    Prior to Admission Medications:  Prescriptions Prior to Admission   Medication Sig Dispense Refill Last Dose     RaNITidine HCl (ZANTAC PO) Take 150 mg by mouth 2 times daily   9/24/2017 at 2100     magnesium hydroxide (MILK OF MAGNESIA) 400 MG/5ML suspension Take 30 mLs by mouth 2 times daily   9/24/2017 at 2100     senna-docusate (SENOKOT-S;PERICOLACE) 8.6-50 MG per tablet Take 2 tablets by mouth 2 times daily   9/24/2017 at 2100     enoxaparin (LOVENOX) 40 MG/0.4ML injection Inject 40 mg Subcutaneous daily   9/24/2017 at 2100     hydrocortisone 2.5 % cream Apply topically 2 times daily   9/24/2017 at 2100     [DISCONTINUED] HYDROmorphone, STANDARD CONC, (DILAUDID) 1 MG/ML LIQD liquid Inject 1 mg into the vein every  2 hours as needed for moderate to severe pain   9/25/2017 at 1453     [DISCONTINUED] GABAPENTIN PO Take 900 mg by mouth 2 times daily   9/24/2017 at 2100     Discharge Medications:  ciprofloxacin (CIPRO) 500 MG tablet  Take 1 tablet (500 mg) by mouth every 12 hours for 7 days     DULoxetine (CYMBALTA) 30 MG EC capsule  Take 1 capsule (30 mg) by mouth daily     enoxaparin (LOVENOX) 40 MG/0.4ML injection  Inject 40 mg Subcutaneous daily     gabapentin (NEURONTIN) 400 MG capsule  Take 3 capsules (1,200 mg) by mouth 3 times daily     hydrocortisone 2.5 % cream  Apply topically 2 times daily     ibuprofen (ADVIL/MOTRIN) 600 MG tablet  Take 1 tablet (600 mg) by mouth every 6 hours     magnesium hydroxide (MILK OF MAGNESIA) 400 MG/5ML suspension  Take 30 mLs by mouth 2 times daily     methocarbamol (ROBAXIN) 500 MG tablet  Take 1 tablet (500 mg) by mouth every 6 hours as needed for muscle spasms     metroNIDAZOLE (FLAGYL) 500 MG tablet  Take 1 tablet (500 mg) by mouth every 8 hours for 7 days     oxyCODONE (ROXICODONE) 10 MG IR tablet  Take 1-1.5 tablets (10-15 mg) by mouth every 4 hours as needed for moderate to severe pain     RaNITidine HCl (ZANTAC PO)  Take 150 mg by mouth 2 times daily     senna-docusate (SENOKOT-S;PERICOLACE) 8.6-50 MG per tablet  Take 2 tablets by mouth 2 times daily     simethicone (MYLICON) 80 MG chewable tablet  Take 1.5 tablets (120 mg) by mouth every 6 hours       Consultations:  Neurology  Respiratory therapy  Hematology  Physical medicine and rehabilitation  Physical therapy  Occupational therapy  Pain medicine    Brief History of Illness: Lynne Llanos is a 47 year old P 2-0-2-2 female who was transferred here from 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 She noted she was doing well until about 9/6/17 when she woke up with some numbness sensation to her hands and feet. She then developed sharp pain sensation  shooting from her fingers to her elbow. She was more numb on her left leg compared to her right leg. A few days later she started experiencing weakness and right facial droop. She was evaluated in the [facility] a couple of times and underwent CT of the brain, cervical and lumbar spine. She was referred to [facility] for further work up and evaluation. She was seen by medicine and the neurology team. MRI of brain, cervical, thoracic, and lumbar spine completed, LP with CSF studies to eval cell count, protein, glucose, stain and culture and completed a 5 day course of IVIG. CXF showed elevated protein c/w Guillain-[place] and culture was negative for infection. Her pelvic mass was incidentally seen on MRI so a CT A/P was completed and revealed a left ovarian mass 6x9x10 cm and a posterior fundal fibroid measuring 4 cm. Patient noted she had not had menses over the last 4 months and she assumed it was perimenopausal. No new sexual partners, no exposure to STDs, no pelvic pain or pressure, no change in bowel or bladder function, no early satiety and no weight loss or gain. She had a non-productive, chronic cough that developed about a month before the symptoms started but had not experience fevers, chills, night sweats, weight loss, rashes, nausea, vomiting or diarrhea. She was admitted for surgical management of her pelvic mass.      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  Frozen section at the time of the surgery was benign.  Final pathology showed endometriosis.  She will follow-up postoperatively in clinic with Dr. Haley (GYN) in [place], [place] in 1 week from discharge. Please see progress note from day of discharge for full details of her condition. In brief her exam was stable from previous and vital signs were within normal limits.  FEN:   - She had hyponatremia with bri of Na to 127 at OSH.  On admit, her Na had normalized to 135. She had a regular diet prior to surgery. She was placed NPO on evening prior to surgery and maintained on IVF until POD#1, when her diet was slowly advanced.  By discharge, she was tolerating a regular diet without nausea and vomiting and able to maintain adequate PO intake without IVF supplementation. Her electrolytes were within normal when last checked on 9/30.   Pain:   - She had been receiving oxycodone, dilaudid and gabapentin 900mg BID for pain prior to admit. She was continued on these during her stay. After surgery, her pain was initially controlled on Dilaudid PCA which was weaned on POD#2. She had pain medication needs in excess of what was expected and on POD#2 pain team was consulted. She was given q2h PRN IV dilaudid. On POD#3, dilaudid was decreased to TID and then discontinued on POD#4. Once IV dilaudid was weaned, she required up titration of her oxycodone from 10mg Q3 to 15 mg Q4h. It was recommended that she wean down oxycodone to 10mg q4h as soon as able. She was also on scheduled ibuprofen. Her gabapentin was titrated up to 1200mg TID during this time. She was discharged to [facility] on PO pain regimen including oxycodone, Gabapentin, Ibuprofen, robaxin and Cymbalta.  CV:   - She had no history of CV issues. An EKG was completed on admit for preoperative clearance and showed normal sinus rhythm. She had no acute cardiovascular issues during her admission.  PULM:   - She had no history of pulmonary issues. Given Guillian-[place] syndrome diagnosis, her negative inspiratory force and FVC were monitored initially q8h by respiratory therapy per Neurology recommendations and remained within normal parameters. She was transitioned to BID respiratory monitoring while inpatient. CXR was performed on 9/25/17 for PICC line evaluation revealing atelectasis. She continued to use bedside IS for pulmonary toilette. On discharge, her O2 sats were greater than 90% on  RA.   HEME:   - Preoperative Hgb 12.0. EBL was 350mL. Her hgb dropped to 9.0 postoperatively and discharge Hgb was stable at 9.8. She had leukopenia on admit with WBC 3.2 with ANC of 0.6. Hematology was consulted and recommended peripheral smear and flow cytometry. Reticulocyte count was appropriately elevated to 3.2. Smear and flow cytometry was normal. Folate and Vitamin B12 were within normal. Viral panel was obtained and was notable for positive hepatitis B core antibody, with negative surface antigen and positive surface antibodies, revealing immunity due to prior infection. Hematology recommended continued outpatient CBC trending with consideration of outpatient bone marrow biopsy if the unexplained leukocytosis was persistent. WBC on discharge was 3.5 and ANC was 1.4, which were trending up from bri of WBC 1.9 and ANC 0.6. She was afebrile with no sigsn of neutropenic fever. She was given prophylactic lovenox while inpatient and this should be continued for a total of 28 days   GI:   - She was given regular diet other than when she made NPO prior to the procedure. Her home zantac BID was continued while inpatient. On POD#0, her diet was advanced to clear liquids and then advanced slowly as tolerated.  At discharge, she was tolerating a regular diet without nausea and vomiting. On POD#6 she had diarrhea x5, a c dif was collected and negative. She will be discharged with a bowel regimen to prevent constipation in the postoperative period.   :    - Prior to surgery, she was voiding without issues. She had normal Cr 0.6 on admission. A mendez catheter was placed at the time of the surgery.  Once ambulating unassisted, the mendez catheter was removed. She had urinary urgency and UA was not concerning for UTI on 9/29. Her urgency resolved without further intervention. Prior to discharge, the patient was voiding spontaneously without difficulty.  ID:   - Given concern for intraoperative encounter of purulent fluid  there was concern for diverticular abscess vs. TOA, she was treated for diverticular abscess with ciprofloxacin and flagyl PO for a total of 14 days (start date 9/27/17, end date 10/10/17). She was discharged on day 8 of 14. She had LOURDES drain placed in LLQ at time of surgery on 9/27. Her drain had minimal output in postoperative setting and was removed on 10/1 without difficulty. She remained afebrile with no signs of evolving infection.  ENDO:   - No issues during this admission.  PSYCH/NEURO:   - She had diagnosis of Guillian-[place] syndrome on admit felt secondary to possible paraneoplastic syndrome with left adnexal mass. She had undergo work up at outside hospital with negative Lyme serology, paraneoplastic panel ([place]), CSF showing elevated protein and negative culture. She had MRI/CT on 9/10 with cervical and lumbar canal stenosis but no acute intracranial pathology noted. She had received a 5 day course of IVIg that was completed on 9/18/17 prior to admit. She had bilateral upper and lower extremity weakness and neuropathy for which she received gabapentin TID. She had fascial muscle weakness with right facial droop and had received PO prednisone course for this prior to admit. She received no further PO prednisone while admitted here. Neurology saw her on HD#1 and recommended q4h neuro checks, q8h negative inspiratory force/FVC and EMG. She underwent EMG on 9/26 which showed sensorimotor, demyelinating, non-length depending polyneuropathy consistent with GBS. She was seen by OT and PT with recommendations for physical medicine and rehabilitation consult. PMR saw patient and recommended discharge to TCU without patient therapy. Neurology recommended outpatient follow up 1-2 months after discharge.  PPX:    - She was given SCDs but declined these due to pain with use. She used IS and lovenox during her hospital course.  She tolerated these prophylactic interventions without incident.  Lovenox was continued on  discharge for a total of 28 days.     Discharge Instructions and Follow up:  Ms. Lynne Llanos was discharged from the hospital with follow up for:  - Dr. Cordero on 11/14/17 if she would like to return to [place] for follow. She may also follow up with Dr. Haley (GYN) in [place], [place] given distance to return to [place] and limited resources for travel. I spoke with Dr. Haley on 10/3/2017 and she was willing to take over the care of this patient given benign disease and need for postoperative follow up close to [place].   - Neurology follow up within 4-8 weeks from discharge  - Continue ongoing PT/OT rehab at TCU in [place], [place]  - Continue outpatient CBC with differential at least weekly; will need outpatient follow up with hematology for bone marrow biopsy if the leukocytosis does not resolved.    Discharge Diet: Regular    Discharge Activity: No lifting greater than 10-15 lbs, pushing, pulling, or other strenuous activity for 6 weeks. Pelvic rest for 6 weeks including no sexual intercourse, tampons, or douching. No driving until you can slam on the brakes without pain or while on narcotic pain medications.     Discharge Disposition:  Discharged to [facility]; Dr. Melquiades Alonso accepting hospitalist. Discussed transfer of care over phone on 10/3/2017 prior to patient discharging.     Greater than 30 minutes spent on discharge planning.     Mary Zarco MD  Obstetrics and Gyncology, PGY-1  October 4, 2017 , 5:58 AM

## 2017-09-26 NOTE — PROGRESS NOTES
"I was asked by the inpatient Neurology service to comment on the diagnosis and management of Mrs Llanos's neuropathy.  The EMG we did today shows findings that suggest the diagnosis of an acquired demyelinating neuropathy. The fact that she has had symptoms for a little more than 2 weeks, plus cranial neuropathies (right facial palsy), albumin cytologic dissociation, all favor the diagnosis of AIDP (GBS).  The patient has improved after the initial IVIG therapy (she could previously not stand, now she can with assistance).  Second line treatments for GBS after initial round of 5 IVIG doses or plasmapheresis are generally not supported by evidence, and have been poorly studied so far in GBS. In a person with even minor improvement, and no relapse/set back, I do NOT recommend additional treatments.    The discussion about \"paraneoplastic GBS\" is more of an academic discussion with limited benefit to the patient. Paraneoplastic GBS is exceedingly rare and when a patient is found to have a malignancy with GBS, it is probably more likely that the two coexist by chance, rather than by causative association. That said, I concur with the plan to address the Ob/Gyn imaging abnormal findings at the discretion of our Ob/Gyn colleagues.     AIDP should gradually improve over time, but this may take months.   It would be interesting to have a paraneoplastic and a serum ganglioside antibody panel on board, although the management will not change regardless of the results.  NIF/VC should be monitored q6-8 hours as currently ordered, if patient is stable then the frequency of measurements can be reduced accordingly. Also continue regular medical cares (PT/OT/Rehab assessment/DVT prophylaxis, etc)    Thank you    Wayne Biswas MD    "

## 2017-09-26 NOTE — PROGRESS NOTES
"Neurology - Progress Note    Subjective: No major events overnight. This morning patient is sleepy. Said she had good response to IVIG, but still has numbness in legs and still feels weak.    Objective:  /64 (BP Location: Left arm)  Pulse 85  Temp 98  F (36.7  C) (Oral)  Resp 18  Ht 1.753 m (5' 9\")  Wt 100.1 kg (220 lb 11.2 oz)  SpO2 97%  BMI 32.59 kg/m2    Neuro exam: drowsy but awakens to verbal stimulation, follows 2-step commands, fluent speech. EOMI, PERRL, symmetric facial expressions, no dysarthria. Strength exam limited by patient cooperation, but roughly 4+/5 Right abduction, biceps, tricep, , hip flexion & 4-/5 Left abduction, biceps, tricep, , hip flexion . Normal tone. Areflexia throughout. Decreased sensation to LT in lower extremities with gradient over shins.     Last two NIF/FVCs: -40/2.6L, -40/2.62L    Impression & Recommendations:    # Suspected Guillan-[place] syndrome. With hx of subacute progressive ascending weakness & numbness, areflexia, albuminocytologic dissociation on CFS, and improvement in symptoms with 5-day course of IVIG. No hx of recent viral or diarrheal syndrome. Question of paraneoplastic syndrome, in the context of possible gyn malignancy. No signs of respiratory decompensation. Overall, patient's symptoms have improved but not returned to baseline.    - EMG/NCS today inpatient.  - PT/OT/PM&R - patient will likely require therapies. May take weeks-months to improve from GBS.  - Do not recommend any additional treatment for GBS at this time.  - For pain, consider increasing neuropathic pain meds (eg can increase gabapentin dosing to 900mg TID, consider adding another agent like duloxetine) & minimizing use of narcotic pain meds.  - continue checking NIF/FVC Q8H. Consider intubation if NIF >-20 or FVC < 1.5L. It is reassuring that these have been stable on serial evaluations.  - malignancy w/u per primary team.    Discussed with Staff Dr. Soto.    Laura " Abdifatah MASON Neurology    Neurology Staff Addendum  I have seen and evaluated the patient today and discussed with the resident team and agree with the documentation.  Please see my addendum from Consult note also dated today.        MARY KATE HORTON MD

## 2017-09-26 NOTE — ANESTHESIA PREPROCEDURE EVALUATION
Anesthesia Evaluation     . Pt has had prior anesthetic.     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 Weakness and tingling in hands, feet and right facial  droop    Cardiovascular:     (+) ----. : . . . :. . Previous cardiac testing date:results:date: results:ECG reviewed date:9/25/2017 results:NSR date: results:          METS/Exercise Tolerance:  >4 METS   Hematologic:  - neg hematologic  ROS       Musculoskeletal:  - neg musculoskeletal ROS       GI/Hepatic:  - neg GI/hepatic ROS       Renal/Genitourinary:         Endo:     (+) Obesity, .      Psychiatric:  - neg psychiatric ROS       Infectious Disease:  - neg infectious disease ROS       Malignancy:   (+) Malignancy History of Other  Other CA Ovarian cancer - mass measuring 6x9x10 cm  elevated at 119 Active status post         Other:    - neg other ROS                 Physical Exam  Normal systems: cardiovascular, pulmonary and dental    Airway   Mallampati: II  TM distance: >3 FB  Neck ROM: full    Dental     Cardiovascular       Pulmonary                     Anesthesia Plan      History & Physical Review  History and physical reviewed and following examination; no interval change.    ASA Status:  3 .    NPO Status:  > 8 hours    Plan for General, ETT and Periph. Nerve Block for postop pain with Intravenous and Propofol induction. Maintenance will be Inhalation and Balanced.    PONV prophylaxis:  Ondansetron (or other 5HT-3) and Dexamethasone or Solumedrol  Additional equipment: 2nd IV      Postoperative Care  Postoperative pain management:  IV analgesics, Oral pain medications, Multi-modal analgesia and Peripheral nerve block (Single Shot).      Consents  Anesthetic plan, risks, benefits and alternatives discussed with:  Patient..        Procedure:  Procedure(s):  Laparotomy Exploratory, Total Abdominal Hysterectomy, Bilateral Salpingectomy, Left Oophrectomy, Possible Staging and possible Right Oophrectomy.  Anesthesia Block - Wound Class:    - Wound Class:     HPI: 47 year old female with pelvic mass who requires anesthesia for Procedure(s):  Laparotomy Exploratory, Total Abdominal Hysterectomy, Bilateral Salpingectomy, Left Oophrectomy, Possible Staging and possible Right Oophrectomy.  Anesthesia Block - Wound Class:    - Wound Class: .     Pt without previous significant PMHx. Pt with numbness, tingling, and weakness in hands and feet starting on 9/6. Pt then with sharp shooting pain from fingers to elbows and progressive weakness of legs. Right facial droop noted several days later and patient went to ER in [place]. Pt also with noted speech and swallowing difficulties. Pt diagnosed with GBS and transferred to [facility]. Pt given 5 day course of IVIG with some improvement noted and is now able to stand again. Pt currently tolerating regular diet. Pt incidently found to have large pelvic/ovarian mass. GBS syndrome thought to be paraneoplastic in nature. Pt transferred to [facility] for further management on 9/25.    PMH/PSH:  Past Medical History:   Diagnosis Date     Guillain Barr[place] syndrome Probable perineoplastic      Obesity      Pelvic mass        Past Surgical History:   Procedure Laterality Date     FOOT SURGERY Right      Right knee surgery x 4       TUBAL LIGATION           No current facility-administered medications on file prior to encounter.   No current outpatient prescriptions on file prior to encounter.    SH:   Social History   Substance Use Topics     Smoking status: Not on file     Smokeless tobacco: Not on file     Alcohol use Not on file       Allergies:   Allergies   Allergen Reactions     Morphine Itching       NPO Status: Per ASA Guidelines    Labs:    Blood Bank:  Lab Results   Component Value Date    ABO A 09/26/2017    RH Pos  09/26/2017    AS Neg 09/26/2017     BMP:  Recent Labs   Lab Test  09/26/17   0322   NA  135   POTASSIUM  4.1   CHLORIDE  101   CO2  28   BUN  12   CR  0.64   GLC  96   BONNIE  9.3     CBC:   Recent Labs   Lab Test  09/26/17   0322   WBC  2.3*   RBC  4.07   HGB  11.1*   HCT  34.4*   MCV  85   MCH  27.3   MCHC  32.3   RDW  18.8*   PLT  188     Coags:  No results for input(s): INR, PTT, FIBR in the last 35055 hours.    To be discussed with staff.   - ASA 3  - GETA with standard ASA monitors, IV induction, balanced anesthetic  - PIVx2  - Peripheral nerve block for postoperative pain control  - Will avoid succinylcholine given GBS   - Possibility of prolonged intubation postoperatively given GBS   - Antibiotics per surgery  - PONV prophylaxis    DO AIDAN Villanueva-1   Department of Anesthesiology  P: [phone]

## 2017-09-26 NOTE — PROGRESS NOTES
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 She is wondering if there is a way to not have so many interruptions in her sleep.  Pain is well controlled with current pain medications as long as she takes them on time.  Tolerating regualr diet without nausea or vomiting.  Passing flatus, no BM.  Voiding spontaneously. Ambulating with some weakness but improving overall from previous.     Objective:   Patient Vitals for the past 24 hrs:   BP Temp Temp src Pulse Resp SpO2 Height Weight   09/25/17 2247 122/75 98.5  F (36.9  C) Oral 95 16 96 % - -   09/25/17 1925 120/83 98.2  F (36.8  C) Oral 79 16 96 % - 100.1 kg (220 lb 11.2 oz)   09/25/17 1736 - - - - - - 1.753 m (5' 9\") -   09/25/17 1505 (!) 143/95 97.2  F (36.2  C) Oral 83 16 96 % - -     EXAM:   General: appears fatigued, minimally interactive as she notes she is tired, in NAD  CV: regular rate  Resp: no increased work of breathing on room air  Abdomen: soft, non tender, non distended  MSK/Neuro: right side weaker than left per RN, able to move all extremities, gross movements are slow but intentional  Extremities: nontender, no edema, SCDs in place  I/O last 3 completed shifts:  In: 260 [P.O.:240; I.V.:20]  Out: 120 [Urine:120]  24h fluid balance:  +140mL (3 unmeasured voids)    UOP: 25 mL/hr since midnight (has been sleeping and not up to void recently)    Drains: none    Labs:   Hgb 11.1 (OSH) >12 > 11.1  Plts 188 > 188  Na 127 > 132 (OSH) " > 135> 135  WBC 3.87 (OSH) > 3.2. 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 She was transferred for surgical management of left adnexal mass w/ concern for possible CA. Plan for diagnostic laparoscopy, BSO, possible hysterectomy & staging, possible open procedure on 9/27 at 0730 with Dr. Cordero.     Dz: Left complex, cystic adnexal mass, measuring 6 x 9 x10 cm, with thick EMS 1.5 cm. Elevated  with concern for malignancy requiring definitive management with surgery. Currently scheduled for 9/27 0730 Dx Lap, removal of mass, D&C to evaluate for malignancy.  FEN: Regular diet. Hyponatremia; now resolved. Continue to monitor closely. AM BMP stable.   Pain: PRN Oxycodone, dilaudid. Neurontin 900mg BID. LFTs at upper limit of normal; will hold tylenol for now.   Heme: Hgb & Plts wnl on last check. Continue  Lovenox ppx for DVT prevention.  CV: No issues; EKG 9/25 NSR (prelim read).   Pulm: Monitor negative inspiratory force/FVC q8h per neuro recs. Normal overnight.   GI: PRN bowel regimen & antiemetics. Zantac BID. Tums PRN.  : Normal Cr on admit. No other issues. Voiding w/o issues.   ID: Neutropenia- secondary to iatrogenic blood draws versus ongoing illness. AM CBC with worsening leukopenia, stable Plts & Hgb. Continue to monitor closely. Afebrile. 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 CSF w/ elevated protein (OSH). MRI/CT (9/10) w/ cervical spine stenosis C6-7, L5-S1. Head CT neg (9/7). Lyme titers, IgA wnl @ OSH.  - s/p 5d IVIG (9/18) w/ small improvement in sx.  - Neurology following; appreciate ongoing input & cares.  - Neuropathy/weakness in both BLE/BUE; improving, continue gabapentin & close monitoring. EMG planned for 9/26.   - Fascial muscle weakness R> L; facial droop on R; s/p PO course of prednisone 20mg/d; appreciate Neurology recs regarding if further PO steroids indicated.   - q4h Neuro checks per RN  - Negative inspiratory force and FVCs q8 hours, consider intubation if NIF >-20, or FVC <10-15 ml/kg (3061-0900)  - Hydrocortisone cream TID PRN for mild rash on back that was present prior to admission  PPX: Lovenox PPX. SCDs too painful per pt & declines these. Encourage ambulation.   Lines: PICC s/p CXR to confirm placement; appears normal on preliminary read    Disposition: Discharge when meeting discharge goals after surgery including tolerating regular diet without nausea/emesis, pain well controlled on PO medications, voiding/ambulating without issue, passing flatus, vitals within normal limits. Anticipate discharge on POD#3-4 (9/30-10/1).     Viky tOt MD, MPH  OB/GYN PGY4  9/26/2017 5:52 AM    Gyn Onc Pager [phone]      Provider Disclosure:   I agree with above History, Review of Systems, Physical exam and Plan. I have reviewed the content of the documentation and have edited it as needed. I have personally performed the services documented here and the documentation accurately represents those services and the decisions I have made.     Electronically signed by:   Shahana Cordero MD   Gynecologic Oncology   [facility]

## 2017-09-26 NOTE — PROGRESS NOTES
"Social Work: Assessment with Discharge Plan    Patient Name:  Lynne Llanos  :  1970  Age:  47 year old  MRN:  [id number]  Risk/Complexity Score:  Filed Complexity Screen Score: 6  Completed assessment with:  Pt, Unit SW, daughter sleeping in bedside chair.    Presenting Information   Reason for Referral:  Discharge plan, Potential need for community services upon discharge, Financial issues and Transfer plans to originating hospital  Date of Intake:  2017  Referral Source:  Unit SW  Decision Maker:  Pt when able  Alternate Decision Maker:  Pt verbally states she wants her daughter More as her NOK decision maker.  Health Care Directive:  Patient considering completing and Provided education.  Discussed using short form HCD while pt is hospitalized.  Pt encouraged to complete a HCD from [place] when she returns to home.  Living Situation:  House  Previous Functional Status:  Independent - pt states she was working full time overnight.  Patient and family understanding of hospitalization:  Pt states that she has been having \"symptoms\" for the last two weeks.  Pt states she was taken to [facility] after three previous attempts at the [facility] and PCP to get her pain symptoms resolved.  Pt states [facility] transferred the pt to [facility] for higher level of care work up.    Cultural/Language/Spiritual Considerations:  None reported this assessment.  Adjustment to Illness:  Pt adjusting appropriately at bedside.  Pt states she has had a \"long day\" yesterday coming from [place] to [facility].    Physical Health  Reason for Admission:  No diagnosis found.  Services Needed/Recommended:  Other:  TBD pending procedure tomorrow.    Mental Health/Chemical Dependency  Diagnosis:  None reported.  Support/Services in Place:  NA  Services Needed/Recommended:  NA    Support System  Significant relationship at present time:  Pt states her daughter More will be staying with her in the pt's room.  Pt states " "that she has an estranged relationship with her ex .  Pt reports that her ex- was \"controlling\" which prompted her to leave [place] and move to [place] about three years ago.  Pt reports she does have a \"finalized divorce\" from her ex-.  Family of origin is available for support:  Pt reports her daughter More, and friends in [place] who can continue to care for her after her procedure/surgery.  Other support available:  Yes  Gaps in support system:  Pt reports that she does not have funds to pay for her daughter's meals at [facility].  Unit SW will work with accommodations for a short term meal card.  Pt also reports that while at [facility], her sending physician discussed with her that the [facility] will pay for her return after she has completed cares at [facility].  Writer and unit SW will work with medical teams on coordinating returns to [place] once pt is medically cleared.  No transfer agreement has been signed and received at [facility] at this time.  Patient is caregiver to:  None, Pt has two daughters (ages 19 - More, and 20).  Pt states she does not talk to her 21 yo daughter as this daughter continues to live with her ex-.  Pt reports her relationship with her 20 year old daughter is strained due to conflicts with the ex-.  Pt reporting that she prefers to have More be the decision maker for any emergencies.    Provider Information   Primary Care Physician:  No Ref-Primary, Physician   None - pt reports she was seeing a PCP in [place], [place].  Pt reports she will not be going back to see him as he \"yelled at me\" and per pt provided poor care.  Clinic:        :  None    Financial   Income Source:  Pt working nights.  Pt will be faxing a copy of her STD forms to the unit to have medical team complete.  Pt reports that her daughter does not need any documentation for schooling.  Financial Concerns:  Pt reporting concerns with meals for her daughter.  Pt reports " that More's employer/school paid for her to fly to [place] and be with the pt for one week.  More will need to return to [place] after that.  Unit SW working with accommodations to provide short term meals for daughter.  Insurance:    Payor/Plan Subscriber Name Rel Member # Group #   [employer] DUKES  [id number]       [id number]       Discharge Plan   Patient and family discharge goal:  Treatment and rehab care in [place] is preferred, if needed.  Provided education on discharge plan:  YES  Patient agreeable to discharge plan:  YES  A list of Medicare Certified Facilities was provided to the patient and/or family to encourage patient choice. Patient's choices for facility are:  NA - will discuss this further after procedure/surgery.  Will NH provide Skilled rehabilitation or complex medical:  NA  General information regarding anticipated insurance coverage and possible out of pocket cost was discussed. Patient and patient's family are aware patient may incur the cost of transportation to the facility, pending insurance payment: YES  Barriers to discharge:  Medical stability and care needs post surgery/procedure.    Discharge Recommendations   Anticipated Disposition:  TBD  Transportation Needs:  Other:  Pt reports the sending physician from [facility] agreed to have the hospital pay for her return via ambulance.  Will need to verify this with [facility] prior to pt's discharge.  Name of Transportation Company and Phone:  TBD pending discussions with [facility].    Additional comments:   Writer met with pt and unit SW to introduce self and role in consult.  Pt states that she was transferred to [facility] as a higher level of care transfer.  Pt reports that the plan she discussed with her sending physician was to return to [facility] after her care was completed at [facility].  Pt reports she prefers to get rehab care and further medical care in [place] as she did not have a positive  experience with the care system in [facility].  Pt reports that she has friends who could care for her after her hospitalization at [facility].      Unit SW will be following pt for continued care and accommodations per pt's requests.  Unit SW also to follow up on short form HCD and notary for surgery tomorrow.  Writer available for support of unit SW as needed.    ARIANE Scanlon, APSW  6C Unit  - coverage for 7C SW  Phone: [phone]  Pager: [phone]  Unit: [phone]

## 2017-09-26 NOTE — PLAN OF CARE
Spoke with DARIUSZ Ca about PICC line. Pt admitted with PICC line that was placed at an outside facility, no documented measurements or tip confirmation in Epic. Concerns for possible line re-insertion during dressing change from float RN. Relayed concerns to primary RN suggested chest xray to confirm tip location.

## 2017-09-26 NOTE — PLAN OF CARE
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 Ordered for neuro checks q 4. Pt has inspirative treatments by RT ordered q 8hr. Numbness/tingling all over, pt declines socks so per pt \"I can feel the cold on the floor\". Up with assist of 2 + walker for anything further than the bedside, pt able to use commode independently. Voiding spont with adequate UOP, had 2 loose BMs 9/25. Regular diet with small/moderate appetite. Rash on back (unchanged upon admission, hydrocortisone cream at bedside, pt felt better after showering/atarax as well). PICC SL in between IV meds. Please refer to previous note regarding PICC - dressing changed x2 this shift, new securement device placed, x-ray verified appropriate placement, however pt's skin still irritated underneath dressing (had many dressing changes between OSH & 9/26). Please change cap today of PICC.       "

## 2017-09-26 NOTE — PROGRESS NOTES
Social Work Services Progress Note    Hospital Day: 2  Date of Initial Social Work Evaluation:  9/26/17  Collaborated with:  Pt, Pt's dtr (More), Accommodations (Keesha)    Data:  Pt is 46 y/o female admitted to [facility] on 9/25/17. Pt is having surgery tomorrow morning.    Intervention:  SW provided Pt with honoring choices HCD to complete per her request. SW also scheduled a notary so that the document is in place prior to her surgery tomorrow AM.    SW also received approval from [facility] for meal voucher for Pt's dtr. SW provided Dtr with this information and instructed her on how to get meal card.     Assessment:  As above    Plan:    Anticipated Disposition:  TBD    Barriers to d/c plan:  Medical stability, safe DC plan    Follow Up:  SW to continue to follow and assist with DC plan    IBRD Clayton, ARIANE   Surgical Oncology Unit   [phone]  Pager: [phone]

## 2017-09-26 NOTE — PROGRESS NOTES
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   Pt and daughter oriented to room, call light and phone use. Entry point assessment completed. VSS. Prn IV dilaudid and oxycodone given for c/o's gen body pain. Pt stated she feels numbness/tingling all over. SBA/walker to bathroom, \"wobbly on my feet\", pt refused gaitbelt. Had medium soft stool and unmeasured urine-commode ordered at bedside. Pt is tolerating regular diet. Rash(had it at OSH) noted in back, MD aware-meds ordered. PICC Sl'd in between iv meds. Daughter to stay with pt.   P: continue to monitor pain, VS, safety and gen status and continue with POC.  "

## 2017-09-26 NOTE — PLAN OF CARE
Problem: Patient Care Overview  Goal: Plan of Care/Patient Progress Review  Outcome: No Change  HD2 admitted from OSH in [place] with concern for Guillain [place] Syndrome and pelvic mass. A&Ox4, VSS on room air, Neuros intact, UW1 and walker to commode. Voiding adequately, passing gas, no BM today. LS clear, BS active, tolerating a regular diet. Pain is controlled with PO oxycodone, ibuprofen, gabapentin and IV dilaudid. PICC is SL. Plan is for surgery in the morning to remove pelvic mass.

## 2017-09-27 ENCOUNTER — ANESTHESIA (OUTPATIENT)
Dept: SURGERY | Facility: CLINIC | Age: 47
DRG: 742 | End: 2017-09-27
Payer: COMMERCIAL

## 2017-09-27 ENCOUNTER — SURGERY (OUTPATIENT)
Age: 47
End: 2017-09-27
Payer: COMMERCIAL

## 2017-09-27 PROBLEM — Z90.710 S/P HYSTERECTOMY: Status: ACTIVE | Noted: 2017-09-27

## 2017-09-27 LAB
HCG UR QL: NEGATIVE
HCG-TM SERPL-ACNC: <3 IU/L

## 2017-09-27 PROCEDURE — 25000125 ZZHC RX 250: Performed by: STUDENT IN AN ORGANIZED HEALTH CARE EDUCATION/TRAINING PROGRAM

## 2017-09-27 PROCEDURE — 27110028 ZZH OR GENERAL SUPPLY NON-STERILE: Performed by: OBSTETRICS & GYNECOLOGY

## 2017-09-27 PROCEDURE — 27210794 ZZH OR GENERAL SUPPLY STERILE: Performed by: OBSTETRICS & GYNECOLOGY

## 2017-09-27 PROCEDURE — 0DNP0ZZ RELEASE RECTUM, OPEN APPROACH: ICD-10-PCS | Performed by: OBSTETRICS & GYNECOLOGY

## 2017-09-27 PROCEDURE — 40000275 ZZH STATISTIC RCP TIME EA 10 MIN

## 2017-09-27 PROCEDURE — 25000128 H RX IP 250 OP 636: Performed by: STUDENT IN AN ORGANIZED HEALTH CARE EDUCATION/TRAINING PROGRAM

## 2017-09-27 PROCEDURE — 25000125 ZZHC RX 250: Performed by: OBSTETRICS & GYNECOLOGY

## 2017-09-27 PROCEDURE — 88305 TISSUE EXAM BY PATHOLOGIST: CPT | Performed by: OBSTETRICS & GYNECOLOGY

## 2017-09-27 PROCEDURE — 88331 PATH CONSLTJ SURG 1 BLK 1SPC: CPT | Performed by: OBSTETRICS & GYNECOLOGY

## 2017-09-27 PROCEDURE — 0UT70ZZ RESECTION OF BILATERAL FALLOPIAN TUBES, OPEN APPROACH: ICD-10-PCS | Performed by: OBSTETRICS & GYNECOLOGY

## 2017-09-27 PROCEDURE — 25000132 ZZH RX MED GY IP 250 OP 250 PS 637: Performed by: OBSTETRICS & GYNECOLOGY

## 2017-09-27 PROCEDURE — C9399 UNCLASSIFIED DRUGS OR BIOLOG: HCPCS | Performed by: STUDENT IN AN ORGANIZED HEALTH CARE EDUCATION/TRAINING PROGRAM

## 2017-09-27 PROCEDURE — 37000008 ZZH ANESTHESIA TECHNICAL FEE, 1ST 30 MIN: Performed by: OBSTETRICS & GYNECOLOGY

## 2017-09-27 PROCEDURE — C9290 INJ, BUPIVACAINE LIPOSOME: HCPCS | Performed by: STUDENT IN AN ORGANIZED HEALTH CARE EDUCATION/TRAINING PROGRAM

## 2017-09-27 PROCEDURE — 25000132 ZZH RX MED GY IP 250 OP 250 PS 637: Performed by: STUDENT IN AN ORGANIZED HEALTH CARE EDUCATION/TRAINING PROGRAM

## 2017-09-27 PROCEDURE — 36000059 ZZH SURGERY LEVEL 3 EA 15 ADDTL MIN UMMC: Performed by: OBSTETRICS & GYNECOLOGY

## 2017-09-27 PROCEDURE — 88332 PATH CONSLTJ SURG EA ADD BLK: CPT | Performed by: OBSTETRICS & GYNECOLOGY

## 2017-09-27 PROCEDURE — 58150 TOTAL HYSTERECTOMY: CPT | Mod: GC | Performed by: OBSTETRICS & GYNECOLOGY

## 2017-09-27 PROCEDURE — 88309 TISSUE EXAM BY PATHOLOGIST: CPT | Performed by: OBSTETRICS & GYNECOLOGY

## 2017-09-27 PROCEDURE — 0TN70ZZ RELEASE LEFT URETER, OPEN APPROACH: ICD-10-PCS | Performed by: OBSTETRICS & GYNECOLOGY

## 2017-09-27 PROCEDURE — 71000015 ZZH RECOVERY PHASE 1 LEVEL 2 EA ADDTL HR: Performed by: OBSTETRICS & GYNECOLOGY

## 2017-09-27 PROCEDURE — 00000155 ZZHCL STATISTIC H-CELL BLOCK W/STAIN: Performed by: OBSTETRICS & GYNECOLOGY

## 2017-09-27 PROCEDURE — 0TJB8ZZ INSPECTION OF BLADDER, VIA NATURAL OR ARTIFICIAL OPENING ENDOSCOPIC: ICD-10-PCS | Performed by: OBSTETRICS & GYNECOLOGY

## 2017-09-27 PROCEDURE — 12000008 ZZH R&B INTERMEDIATE UMMC

## 2017-09-27 PROCEDURE — 99232 SBSQ HOSP IP/OBS MODERATE 35: CPT | Mod: GC | Performed by: OBSTETRICS & GYNECOLOGY

## 2017-09-27 PROCEDURE — 25000128 H RX IP 250 OP 636: Performed by: OBSTETRICS & GYNECOLOGY

## 2017-09-27 PROCEDURE — 94150 VITAL CAPACITY TEST: CPT

## 2017-09-27 PROCEDURE — 0TN60ZZ RELEASE RIGHT URETER, OPEN APPROACH: ICD-10-PCS | Performed by: OBSTETRICS & GYNECOLOGY

## 2017-09-27 PROCEDURE — 71000014 ZZH RECOVERY PHASE 1 LEVEL 2 FIRST HR: Performed by: OBSTETRICS & GYNECOLOGY

## 2017-09-27 PROCEDURE — 37000009 ZZH ANESTHESIA TECHNICAL FEE, EACH ADDTL 15 MIN: Performed by: OBSTETRICS & GYNECOLOGY

## 2017-09-27 PROCEDURE — 0DJD8ZZ INSPECTION OF LOWER INTESTINAL TRACT, VIA NATURAL OR ARTIFICIAL OPENING ENDOSCOPIC: ICD-10-PCS | Performed by: OBSTETRICS & GYNECOLOGY

## 2017-09-27 PROCEDURE — 25000125 ZZHC RX 250: Performed by: RESIDENTIAL TREATMENT FACILITY, PHYSICAL DISABILITIES

## 2017-09-27 PROCEDURE — 88112 CYTOPATH CELL ENHANCE TECH: CPT | Performed by: OBSTETRICS & GYNECOLOGY

## 2017-09-27 PROCEDURE — 81025 URINE PREGNANCY TEST: CPT | Performed by: OBSTETRICS & GYNECOLOGY

## 2017-09-27 PROCEDURE — 0UT90ZZ RESECTION OF UTERUS, OPEN APPROACH: ICD-10-PCS | Performed by: OBSTETRICS & GYNECOLOGY

## 2017-09-27 PROCEDURE — 0DNN0ZZ RELEASE SIGMOID COLON, OPEN APPROACH: ICD-10-PCS | Performed by: OBSTETRICS & GYNECOLOGY

## 2017-09-27 PROCEDURE — 40000170 ZZH STATISTIC PRE-PROCEDURE ASSESSMENT II: Performed by: OBSTETRICS & GYNECOLOGY

## 2017-09-27 PROCEDURE — 36000057 ZZH SURGERY LEVEL 3 1ST 30 MIN - UMMC: Performed by: OBSTETRICS & GYNECOLOGY

## 2017-09-27 PROCEDURE — 0UT20ZZ RESECTION OF BILATERAL OVARIES, OPEN APPROACH: ICD-10-PCS | Performed by: OBSTETRICS & GYNECOLOGY

## 2017-09-27 PROCEDURE — 0UTC0ZZ RESECTION OF CERVIX, OPEN APPROACH: ICD-10-PCS | Performed by: OBSTETRICS & GYNECOLOGY

## 2017-09-27 PROCEDURE — 25000566 ZZH SEVOFLURANE, EA 15 MIN: Performed by: OBSTETRICS & GYNECOLOGY

## 2017-09-27 RX ORDER — HYDROMORPHONE HYDROCHLORIDE 1 MG/ML
.3-.5 INJECTION, SOLUTION INTRAMUSCULAR; INTRAVENOUS; SUBCUTANEOUS EVERY 5 MIN PRN
Status: DISCONTINUED | OUTPATIENT
Start: 2017-09-27 | End: 2017-09-27 | Stop reason: HOSPADM

## 2017-09-27 RX ORDER — HEPARIN SODIUM 5000 [USP'U]/.5ML
5000 INJECTION, SOLUTION INTRAVENOUS; SUBCUTANEOUS
Status: COMPLETED | OUTPATIENT
Start: 2017-09-27 | End: 2017-09-27

## 2017-09-27 RX ORDER — BUPIVACAINE HYDROCHLORIDE AND EPINEPHRINE 2.5; 5 MG/ML; UG/ML
INJECTION, SOLUTION INFILTRATION; PERINEURAL PRN
Status: DISCONTINUED | OUTPATIENT
Start: 2017-09-27 | End: 2017-09-27

## 2017-09-27 RX ORDER — SIMETHICONE 80 MG
80 TABLET,CHEWABLE ORAL 4 TIMES DAILY PRN
Status: DISCONTINUED | OUTPATIENT
Start: 2017-09-27 | End: 2017-09-28

## 2017-09-27 RX ORDER — SODIUM CHLORIDE, SODIUM LACTATE, POTASSIUM CHLORIDE, CALCIUM CHLORIDE 600; 310; 30; 20 MG/100ML; MG/100ML; MG/100ML; MG/100ML
INJECTION, SOLUTION INTRAVENOUS CONTINUOUS
Status: DISCONTINUED | OUTPATIENT
Start: 2017-09-27 | End: 2017-09-27 | Stop reason: HOSPADM

## 2017-09-27 RX ORDER — SODIUM CHLORIDE, SODIUM LACTATE, POTASSIUM CHLORIDE, CALCIUM CHLORIDE 600; 310; 30; 20 MG/100ML; MG/100ML; MG/100ML; MG/100ML
INJECTION, SOLUTION INTRAVENOUS CONTINUOUS
Status: DISCONTINUED | OUTPATIENT
Start: 2017-09-27 | End: 2017-09-28

## 2017-09-27 RX ORDER — ONDANSETRON 4 MG/1
4 TABLET, ORALLY DISINTEGRATING ORAL EVERY 8 HOURS PRN
Status: DISCONTINUED | OUTPATIENT
Start: 2017-09-28 | End: 2017-10-04 | Stop reason: HOSPADM

## 2017-09-27 RX ORDER — NALOXONE HYDROCHLORIDE 0.4 MG/ML
.1-.4 INJECTION, SOLUTION INTRAMUSCULAR; INTRAVENOUS; SUBCUTANEOUS
Status: DISCONTINUED | OUTPATIENT
Start: 2017-09-27 | End: 2017-09-27

## 2017-09-27 RX ORDER — IBUPROFEN 600 MG/1
600 TABLET, FILM COATED ORAL EVERY 6 HOURS
Status: DISCONTINUED | OUTPATIENT
Start: 2017-09-27 | End: 2017-10-04 | Stop reason: HOSPADM

## 2017-09-27 RX ORDER — HYDROMORPHONE HCL/0.9% NACL/PF 0.2MG/0.2
.2-.3 SYRINGE (ML) INTRAVENOUS
Status: DISCONTINUED | OUTPATIENT
Start: 2017-09-27 | End: 2017-09-27

## 2017-09-27 RX ORDER — PHENAZOPYRIDINE HYDROCHLORIDE 200 MG/1
200 TABLET, FILM COATED ORAL ONCE
Status: COMPLETED | OUTPATIENT
Start: 2017-09-27 | End: 2017-09-27

## 2017-09-27 RX ORDER — NALOXONE HYDROCHLORIDE 0.4 MG/ML
.1-.4 INJECTION, SOLUTION INTRAMUSCULAR; INTRAVENOUS; SUBCUTANEOUS
Status: DISCONTINUED | OUTPATIENT
Start: 2017-09-27 | End: 2017-09-27 | Stop reason: HOSPADM

## 2017-09-27 RX ORDER — FENTANYL CITRATE 50 UG/ML
25-50 INJECTION, SOLUTION INTRAMUSCULAR; INTRAVENOUS
Status: DISCONTINUED | OUTPATIENT
Start: 2017-09-27 | End: 2017-09-27 | Stop reason: HOSPADM

## 2017-09-27 RX ORDER — ONDANSETRON 4 MG/1
4 TABLET, ORALLY DISINTEGRATING ORAL EVERY 30 MIN PRN
Status: DISCONTINUED | OUTPATIENT
Start: 2017-09-27 | End: 2017-09-27 | Stop reason: HOSPADM

## 2017-09-27 RX ORDER — CEFAZOLIN SODIUM 1 G/3ML
1 INJECTION, POWDER, FOR SOLUTION INTRAMUSCULAR; INTRAVENOUS SEE ADMIN INSTRUCTIONS
Status: DISCONTINUED | OUTPATIENT
Start: 2017-09-27 | End: 2017-09-27 | Stop reason: HOSPADM

## 2017-09-27 RX ORDER — PROPOFOL 10 MG/ML
INJECTION, EMULSION INTRAVENOUS PRN
Status: DISCONTINUED | OUTPATIENT
Start: 2017-09-27 | End: 2017-09-27

## 2017-09-27 RX ORDER — FLUMAZENIL 0.1 MG/ML
0.2 INJECTION, SOLUTION INTRAVENOUS
Status: DISCONTINUED | OUTPATIENT
Start: 2017-09-27 | End: 2017-09-27 | Stop reason: HOSPADM

## 2017-09-27 RX ORDER — OXYCODONE HYDROCHLORIDE 5 MG/1
5-10 TABLET ORAL
Status: DISCONTINUED | OUTPATIENT
Start: 2017-09-27 | End: 2017-09-27

## 2017-09-27 RX ORDER — LIDOCAINE 40 MG/G
CREAM TOPICAL
Status: DISCONTINUED | OUTPATIENT
Start: 2017-09-27 | End: 2017-10-04 | Stop reason: HOSPADM

## 2017-09-27 RX ORDER — LABETALOL HYDROCHLORIDE 5 MG/ML
10 INJECTION, SOLUTION INTRAVENOUS
Status: DISCONTINUED | OUTPATIENT
Start: 2017-09-27 | End: 2017-09-27 | Stop reason: HOSPADM

## 2017-09-27 RX ORDER — DEXAMETHASONE SODIUM PHOSPHATE 4 MG/ML
INJECTION, SOLUTION INTRA-ARTICULAR; INTRALESIONAL; INTRAMUSCULAR; INTRAVENOUS; SOFT TISSUE PRN
Status: DISCONTINUED | OUTPATIENT
Start: 2017-09-27 | End: 2017-09-27

## 2017-09-27 RX ORDER — ACETAMINOPHEN 325 MG/1
650 TABLET ORAL EVERY 6 HOURS
Status: DISCONTINUED | OUTPATIENT
Start: 2017-09-27 | End: 2017-09-29

## 2017-09-27 RX ORDER — CEFAZOLIN SODIUM 2 G/100ML
2 INJECTION, SOLUTION INTRAVENOUS
Status: COMPLETED | OUTPATIENT
Start: 2017-09-27 | End: 2017-09-27

## 2017-09-27 RX ORDER — LIDOCAINE HYDROCHLORIDE 20 MG/ML
INJECTION, SOLUTION INFILTRATION; PERINEURAL PRN
Status: DISCONTINUED | OUTPATIENT
Start: 2017-09-27 | End: 2017-09-27

## 2017-09-27 RX ORDER — KETOROLAC TROMETHAMINE 30 MG/ML
INJECTION, SOLUTION INTRAMUSCULAR; INTRAVENOUS PRN
Status: DISCONTINUED | OUTPATIENT
Start: 2017-09-27 | End: 2017-09-27

## 2017-09-27 RX ORDER — CIPROFLOXACIN 500 MG/1
500 TABLET, FILM COATED ORAL EVERY 12 HOURS SCHEDULED
Status: DISCONTINUED | OUTPATIENT
Start: 2017-09-27 | End: 2017-10-04 | Stop reason: HOSPADM

## 2017-09-27 RX ORDER — HYDRALAZINE HYDROCHLORIDE 20 MG/ML
2.5-5 INJECTION INTRAMUSCULAR; INTRAVENOUS EVERY 10 MIN PRN
Status: DISCONTINUED | OUTPATIENT
Start: 2017-09-27 | End: 2017-09-27 | Stop reason: HOSPADM

## 2017-09-27 RX ORDER — DEXTROSE, SODIUM CHLORIDE, SODIUM LACTATE, POTASSIUM CHLORIDE, AND CALCIUM CHLORIDE 5; .6; .31; .03; .02 G/100ML; G/100ML; G/100ML; G/100ML; G/100ML
INJECTION, SOLUTION INTRAVENOUS CONTINUOUS
Status: DISCONTINUED | OUTPATIENT
Start: 2017-09-27 | End: 2017-09-27

## 2017-09-27 RX ORDER — ONDANSETRON 2 MG/ML
INJECTION INTRAMUSCULAR; INTRAVENOUS PRN
Status: DISCONTINUED | OUTPATIENT
Start: 2017-09-27 | End: 2017-09-27

## 2017-09-27 RX ORDER — AMOXICILLIN 250 MG
1-2 CAPSULE ORAL 2 TIMES DAILY
Status: DISCONTINUED | OUTPATIENT
Start: 2017-09-27 | End: 2017-10-04 | Stop reason: HOSPADM

## 2017-09-27 RX ORDER — ONDANSETRON 4 MG/1
4 TABLET, ORALLY DISINTEGRATING ORAL EVERY 8 HOURS
Status: COMPLETED | OUTPATIENT
Start: 2017-09-27 | End: 2017-09-28

## 2017-09-27 RX ORDER — BISACODYL 10 MG
10 SUPPOSITORY, RECTAL RECTAL DAILY
Status: DISCONTINUED | OUTPATIENT
Start: 2017-09-27 | End: 2017-10-04 | Stop reason: HOSPADM

## 2017-09-27 RX ORDER — HYDROMORPHONE HYDROCHLORIDE 1 MG/ML
0.5 INJECTION, SOLUTION INTRAMUSCULAR; INTRAVENOUS; SUBCUTANEOUS ONCE
Status: DISCONTINUED | OUTPATIENT
Start: 2017-09-27 | End: 2017-10-01

## 2017-09-27 RX ORDER — ONDANSETRON 2 MG/ML
4 INJECTION INTRAMUSCULAR; INTRAVENOUS EVERY 30 MIN PRN
Status: DISCONTINUED | OUTPATIENT
Start: 2017-09-27 | End: 2017-09-27 | Stop reason: HOSPADM

## 2017-09-27 RX ORDER — NALOXONE HYDROCHLORIDE 0.4 MG/ML
.1-.4 INJECTION, SOLUTION INTRAMUSCULAR; INTRAVENOUS; SUBCUTANEOUS
Status: DISCONTINUED | OUTPATIENT
Start: 2017-09-27 | End: 2017-10-04 | Stop reason: HOSPADM

## 2017-09-27 RX ORDER — METRONIDAZOLE 500 MG/1
500 TABLET ORAL EVERY 8 HOURS SCHEDULED
Status: DISCONTINUED | OUTPATIENT
Start: 2017-09-27 | End: 2017-10-04 | Stop reason: HOSPADM

## 2017-09-27 RX ADMIN — Medication 0.3 MG: at 14:57

## 2017-09-27 RX ADMIN — ONDANSETRON 4 MG: 4 TABLET, ORALLY DISINTEGRATING ORAL at 21:00

## 2017-09-27 RX ADMIN — Medication 0.3 MG: at 15:59

## 2017-09-27 RX ADMIN — HYDROMORPHONE HYDROCHLORIDE 0.4 MG: 1 INJECTION, SOLUTION INTRAMUSCULAR; INTRAVENOUS; SUBCUTANEOUS at 13:42

## 2017-09-27 RX ADMIN — PROPOFOL 50 MG: 10 INJECTION, EMULSION INTRAVENOUS at 08:46

## 2017-09-27 RX ADMIN — GABAPENTIN 900 MG: 300 CAPSULE ORAL at 21:00

## 2017-09-27 RX ADMIN — HYDROMORPHONE HYDROCHLORIDE 0.3 MG: 1 INJECTION, SOLUTION INTRAMUSCULAR; INTRAVENOUS; SUBCUTANEOUS at 13:33

## 2017-09-27 RX ADMIN — HYDROMORPHONE HYDROCHLORIDE 0.3 MG: 1 INJECTION, SOLUTION INTRAMUSCULAR; INTRAVENOUS; SUBCUTANEOUS at 13:26

## 2017-09-27 RX ADMIN — PHENYLEPHRINE HYDROCHLORIDE 100 MCG: 10 INJECTION, SOLUTION INTRAMUSCULAR; INTRAVENOUS; SUBCUTANEOUS at 09:57

## 2017-09-27 RX ADMIN — PHENYLEPHRINE HYDROCHLORIDE 200 MCG: 10 INJECTION, SOLUTION INTRAMUSCULAR; INTRAVENOUS; SUBCUTANEOUS at 09:14

## 2017-09-27 RX ADMIN — FENTANYL CITRATE 50 MCG: 50 INJECTION, SOLUTION INTRAMUSCULAR; INTRAVENOUS at 08:47

## 2017-09-27 RX ADMIN — FENTANYL CITRATE 25 MCG: 50 INJECTION, SOLUTION INTRAMUSCULAR; INTRAVENOUS at 12:01

## 2017-09-27 RX ADMIN — FENTANYL CITRATE 25 MCG: 50 INJECTION, SOLUTION INTRAMUSCULAR; INTRAVENOUS at 10:36

## 2017-09-27 RX ADMIN — BUPIVACAINE 20 ML: 13.3 INJECTION, SUSPENSION, LIPOSOMAL INFILTRATION at 12:16

## 2017-09-27 RX ADMIN — FENTANYL CITRATE 50 MCG: 50 INJECTION, SOLUTION INTRAMUSCULAR; INTRAVENOUS at 12:21

## 2017-09-27 RX ADMIN — MIDAZOLAM HYDROCHLORIDE 1 MG: 1 INJECTION, SOLUTION INTRAMUSCULAR; INTRAVENOUS at 08:02

## 2017-09-27 RX ADMIN — PHENYLEPHRINE HYDROCHLORIDE 0.3 MCG/KG/MIN: 10 INJECTION, SOLUTION INTRAMUSCULAR; INTRAVENOUS; SUBCUTANEOUS at 10:01

## 2017-09-27 RX ADMIN — FENTANYL CITRATE 25 MCG: 50 INJECTION, SOLUTION INTRAMUSCULAR; INTRAVENOUS at 12:46

## 2017-09-27 RX ADMIN — OXYCODONE HYDROCHLORIDE 10 MG: 5 TABLET ORAL at 15:43

## 2017-09-27 RX ADMIN — FENTANYL CITRATE 25 MCG: 50 INJECTION, SOLUTION INTRAMUSCULAR; INTRAVENOUS at 10:02

## 2017-09-27 RX ADMIN — HYDROXYZINE HYDROCHLORIDE 50 MG: 25 TABLET ORAL at 22:31

## 2017-09-27 RX ADMIN — SODIUM CHLORIDE, POTASSIUM CHLORIDE, SODIUM LACTATE AND CALCIUM CHLORIDE: 600; 310; 30; 20 INJECTION, SOLUTION INTRAVENOUS at 09:52

## 2017-09-27 RX ADMIN — DEXAMETHASONE SODIUM PHOSPHATE 4 MG: 4 INJECTION, SOLUTION INTRA-ARTICULAR; INTRALESIONAL; INTRAMUSCULAR; INTRAVENOUS; SOFT TISSUE at 08:34

## 2017-09-27 RX ADMIN — ONDANSETRON 4 MG: 4 TABLET, ORALLY DISINTEGRATING ORAL at 15:43

## 2017-09-27 RX ADMIN — IBUPROFEN 600 MG: 600 TABLET ORAL at 17:35

## 2017-09-27 RX ADMIN — ONDANSETRON 4 MG: 2 INJECTION INTRAMUSCULAR; INTRAVENOUS at 12:08

## 2017-09-27 RX ADMIN — HEPARIN SODIUM 5000 UNITS: 5000 INJECTION, SOLUTION INTRAVENOUS; SUBCUTANEOUS at 07:55

## 2017-09-27 RX ADMIN — GABAPENTIN 900 MG: 300 CAPSULE ORAL at 14:48

## 2017-09-27 RX ADMIN — PHENYLEPHRINE HYDROCHLORIDE 100 MCG: 10 INJECTION, SOLUTION INTRAMUSCULAR; INTRAVENOUS; SUBCUTANEOUS at 09:39

## 2017-09-27 RX ADMIN — SIMETHICONE CHEW TAB 80 MG 80 MG: 80 TABLET ORAL at 21:00

## 2017-09-27 RX ADMIN — SODIUM CHLORIDE, POTASSIUM CHLORIDE, SODIUM LACTATE AND CALCIUM CHLORIDE: 600; 310; 30; 20 INJECTION, SOLUTION INTRAVENOUS at 08:48

## 2017-09-27 RX ADMIN — ROCURONIUM BROMIDE 20 MG: 10 INJECTION INTRAVENOUS at 10:34

## 2017-09-27 RX ADMIN — FENTANYL CITRATE 50 MCG: 50 INJECTION, SOLUTION INTRAMUSCULAR; INTRAVENOUS at 12:08

## 2017-09-27 RX ADMIN — SODIUM CHLORIDE, POTASSIUM CHLORIDE, SODIUM LACTATE AND CALCIUM CHLORIDE: 600; 310; 30; 20 INJECTION, SOLUTION INTRAVENOUS at 00:59

## 2017-09-27 RX ADMIN — FENTANYL CITRATE 100 MCG: 50 INJECTION, SOLUTION INTRAMUSCULAR; INTRAVENOUS at 08:13

## 2017-09-27 RX ADMIN — HYDROMORPHONE HYDROCHLORIDE: 10 INJECTION, SOLUTION INTRAMUSCULAR; INTRAVENOUS; SUBCUTANEOUS at 17:32

## 2017-09-27 RX ADMIN — RANITIDINE 150 MG: 150 TABLET ORAL at 21:01

## 2017-09-27 RX ADMIN — ROCURONIUM BROMIDE 20 MG: 10 INJECTION INTRAVENOUS at 10:02

## 2017-09-27 RX ADMIN — SODIUM CHLORIDE, POTASSIUM CHLORIDE, SODIUM LACTATE AND CALCIUM CHLORIDE: 600; 310; 30; 20 INJECTION, SOLUTION INTRAVENOUS at 14:47

## 2017-09-27 RX ADMIN — ROCURONIUM BROMIDE 10 MG: 10 INJECTION INTRAVENOUS at 09:00

## 2017-09-27 RX ADMIN — FENTANYL CITRATE 50 MCG: 50 INJECTION, SOLUTION INTRAMUSCULAR; INTRAVENOUS at 13:14

## 2017-09-27 RX ADMIN — LIDOCAINE HYDROCHLORIDE 80 MG: 20 INJECTION, SOLUTION INFILTRATION; PERINEURAL at 08:13

## 2017-09-27 RX ADMIN — SODIUM CHLORIDE, POTASSIUM CHLORIDE, SODIUM LACTATE AND CALCIUM CHLORIDE: 600; 310; 30; 20 INJECTION, SOLUTION INTRAVENOUS at 08:02

## 2017-09-27 RX ADMIN — ATROPA BELLADONNA AND OPIUM 1 SUPPOSITORY: 16.2; 3 SUPPOSITORY RECTAL at 13:25

## 2017-09-27 RX ADMIN — MIDAZOLAM HYDROCHLORIDE 1 MG: 1 INJECTION, SOLUTION INTRAMUSCULAR; INTRAVENOUS at 08:12

## 2017-09-27 RX ADMIN — PHENYLEPHRINE HYDROCHLORIDE 100 MCG: 10 INJECTION, SOLUTION INTRAMUSCULAR; INTRAVENOUS; SUBCUTANEOUS at 09:48

## 2017-09-27 RX ADMIN — Medication 7.5 MG: at 18:09

## 2017-09-27 RX ADMIN — CIPROFLOXACIN HYDROCHLORIDE 500 MG: 500 TABLET, FILM COATED ORAL at 21:01

## 2017-09-27 RX ADMIN — GABAPENTIN 900 MG: 300 CAPSULE ORAL at 06:33

## 2017-09-27 RX ADMIN — FENTANYL CITRATE 25 MCG: 50 INJECTION, SOLUTION INTRAMUSCULAR; INTRAVENOUS at 12:41

## 2017-09-27 RX ADMIN — PHENYLEPHRINE HYDROCHLORIDE 100 MCG: 10 INJECTION, SOLUTION INTRAMUSCULAR; INTRAVENOUS; SUBCUTANEOUS at 10:28

## 2017-09-27 RX ADMIN — SUGAMMADEX 200 MG: 100 INJECTION, SOLUTION INTRAVENOUS at 11:59

## 2017-09-27 RX ADMIN — SENNOSIDES AND DOCUSATE SODIUM 1 TABLET: 8.6; 5 TABLET ORAL at 21:01

## 2017-09-27 RX ADMIN — ROCURONIUM BROMIDE 10 MG: 10 INJECTION INTRAVENOUS at 08:46

## 2017-09-27 RX ADMIN — PHENYLEPHRINE HYDROCHLORIDE 100 MCG: 10 INJECTION, SOLUTION INTRAMUSCULAR; INTRAVENOUS; SUBCUTANEOUS at 09:03

## 2017-09-27 RX ADMIN — FENTANYL CITRATE 50 MCG: 50 INJECTION, SOLUTION INTRAMUSCULAR; INTRAVENOUS at 12:53

## 2017-09-27 RX ADMIN — CEFAZOLIN SODIUM 2 G: 2 INJECTION, SOLUTION INTRAVENOUS at 08:30

## 2017-09-27 RX ADMIN — PHENYLEPHRINE HYDROCHLORIDE 150 MCG: 10 INJECTION, SOLUTION INTRAMUSCULAR; INTRAVENOUS; SUBCUTANEOUS at 08:56

## 2017-09-27 RX ADMIN — KETOROLAC TROMETHAMINE 15 MG: 30 INJECTION, SOLUTION INTRAMUSCULAR at 11:42

## 2017-09-27 RX ADMIN — BUPIVACAINE HYDROCHLORIDE AND EPINEPHRINE BITARTRATE 20 ML: 2.5; .005 INJECTION, SOLUTION INFILTRATION; PERINEURAL at 12:16

## 2017-09-27 RX ADMIN — CEFAZOLIN 1 G: 1 INJECTION, POWDER, FOR SOLUTION INTRAMUSCULAR; INTRAVENOUS at 10:36

## 2017-09-27 RX ADMIN — PROPOFOL 50 MG: 10 INJECTION, EMULSION INTRAVENOUS at 12:06

## 2017-09-27 RX ADMIN — PHENYLEPHRINE HYDROCHLORIDE 200 MCG: 10 INJECTION, SOLUTION INTRAMUSCULAR; INTRAVENOUS; SUBCUTANEOUS at 09:24

## 2017-09-27 RX ADMIN — HYDROMORPHONE HYDROCHLORIDE 0.5 MG: 1 INJECTION, SOLUTION INTRAMUSCULAR; INTRAVENOUS; SUBCUTANEOUS at 13:53

## 2017-09-27 RX ADMIN — PHENYLEPHRINE HYDROCHLORIDE 50 MCG: 10 INJECTION, SOLUTION INTRAMUSCULAR; INTRAVENOUS; SUBCUTANEOUS at 08:16

## 2017-09-27 RX ADMIN — PHENYLEPHRINE HYDROCHLORIDE 100 MCG: 10 INJECTION, SOLUTION INTRAMUSCULAR; INTRAVENOUS; SUBCUTANEOUS at 09:06

## 2017-09-27 RX ADMIN — PROPOFOL 150 MG: 10 INJECTION, EMULSION INTRAVENOUS at 08:13

## 2017-09-27 RX ADMIN — FENTANYL CITRATE 50 MCG: 50 INJECTION, SOLUTION INTRAMUSCULAR; INTRAVENOUS at 13:03

## 2017-09-27 RX ADMIN — ROCURONIUM BROMIDE 40 MG: 10 INJECTION INTRAVENOUS at 08:14

## 2017-09-27 RX ADMIN — METRONIDAZOLE 500 MG: 500 TABLET ORAL at 17:35

## 2017-09-27 RX ADMIN — PHENAZOPYRIDINE HYDROCHLORIDE 200 MG: 200 TABLET, COATED ORAL at 07:41

## 2017-09-27 RX ADMIN — FENTANYL CITRATE 25 MCG: 50 INJECTION, SOLUTION INTRAMUSCULAR; INTRAVENOUS at 11:26

## 2017-09-27 ASSESSMENT — PAIN DESCRIPTION - DESCRIPTORS
DESCRIPTORS: CRAMPING;CONSTANT;BURNING
DESCRIPTORS: ACHING;SORE;SHARP
DESCRIPTORS: CRAMPING

## 2017-09-27 NOTE — PROGRESS NOTES
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 Feels like she can sleep a little more. Pain is not well controlled and she notes a little help with increased gabapentin. She notes her pain is \"all over and bad.\"  NPO for surgery without nausea or vomiting.  Passing flatus and had a BM.  Voiding spontaneously. Ambulating with some weakness but note she can walk to bathroom with less difficulty now.     Objective:   Patient Vitals for the past 24 hrs:   BP Temp Temp src Pulse Resp SpO2   09/26/17 2249 110/71 97.8  F (36.6  C) Oral 80 18 96 %   09/26/17 1553 111/55 97.2  F (36.2  C) Oral 90 18 93 %   09/26/17 0743 111/64 98  F (36.7  C) Oral 85 18 97 %     EXAM:   General: appears fatigued, minimally interactive as she notes she is tired, in NAD  CV: regular rate  Resp: no increased work of breathing on room air  Abdomen: soft, non tender, non distended  MSK/Neuro: right side weaker than left per RN neuro checks, able to move all extremities, gross movements are slow but intentional  Extremities: nontender, no edema, SCDs in place    I/O last 3 completed shifts:  In: 800 [P.O.:780; I.V.:20]  Out: 1050 [Urine:450; Other:600]   600 cc was mixed urine/stool;  UOP appears falsely low given 5 unmeasured voids    24h fluid balance:  +507mL    Drains: none    Labs:   Hgb 11.1 (OSH) >12 " > 11.1  Plts 188 > 188  Na 127 > 132 (OSH) > 135> 135  WBC 3.87 (OSH) > 3.2. 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 She was transferred for surgical management of left adnexal mass w/ concern for possible CA. Plan for LISA, BS, LO, poss RO, staging on 9/27 at 0750 with Dr. Cordero. NPO since MN for procedure, consent signed after r/b/a discussed.     Dz: Left complex, cystic adnexal mass, measuring 6 x 9 x10 cm, with thick EMS 1.5 cm. Elevated  with concern for malignancy requiring definitive management with surgery. Currently scheduled for 9/27 0730 Dx Lap, removal of mass, D&C to evaluate for malignancy.  FEN: NPO since MN, IVF LR 100ml/h while NPO. Hyponatremia prior to admit; now resolved. No AM labs.  Pain: PRN Oxycodone, dilaudid. Neurontin 900mg TID. LFTs at upper limit of normal; will hold tylenol for now.   Heme: Hgb & Plts wnl on last check. Continue  Lovenox ppx for DVT prevention.  CV: No issues; EKG 9/25 NSR.   Pulm: Monitor negative inspiratory force/FVC q8h per neuro recs. Normal overnight.   GI: PRN bowel regimen & antiemetics. Zantac BID. Tums PRN.  : Normal Cr on admit. No other issues. Voiding w/o issues.   ID: Neutropenia- secondary to iatrogenic blood draws versus ongoing illness. Stable Plts & Hgb. Continue to monitor closely. Afebrile. 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 CSF w/ elevated protein (OSH) & EMG here consistent with GBS diagnosis. MRI/CT (9/10) w/ cervical spine stenosis C6-7, L5-S1. Head CT neg (9/7). Lyme titers, IgA wnl @ OSH. Parneoplasic panel sent previously to [facility]; results not currently available.   - s/p 5d IVIG (9/18) w/ small improvement in sx.  - Neurology following; appreciate ongoing input & cares.  - Neuropathy/weakness in both BLE/BUE; improving, continue gabapentin & close monitoring.  - Fascial muscle weakness R> L; facial droop on R; s/p PO course of prednisone 20mg/d; appreciate Neurology recs regarding if further PO steroids indicated.   - q8h Neuro checks per RN  - Negative inspiratory force and FVCs q8 hours, consider intubation if NIF >-20, or FVC <10-15 ml/kg (8639-2933)  - Hydrocortisone cream TID PRN for mild rash on back that was present prior to admission; stable.  PPX: Lovenox PPX. SCDs too painful per pt & declines these. Encourage ambulation.   Lines: PICC     Disposition: Discharge when meeting discharge goals after surgery including tolerating regular diet without nausea/emesis, pain well controlled on PO medications, voiding/ambulating without issue, passing flatus, vitals within normal limits. Anticipate discharge on POD#3-4 (9/30-10/1).     Viky Ott MD, MPH  OB/GYN PGY4  9/27/2017 5:52 AM   Gyn Onc Pager [phone]      Provider Disclosure:   I agree with above History, Review of Systems, Physical exam and Plan. I have reviewed the content of the documentation and have edited it as needed. I have personally performed the services documented here and the documentation accurately represents those services and the decisions I have made.     Electronically signed by:   Melchor Dawkins MD   Gynecologic Oncology   [facility]

## 2017-09-27 NOTE — PLAN OF CARE
Problem: Patient Care Overview  Goal: Plan of Care/Patient Progress Review  Outcome: No Change  Pt received from PACU alert and oriented,s/p LISA/BSO.C/O severe pain upon arrival,dilaudid 0.3 mg given with minimal decrease in pain.Neurontin po also given.C/O intermittent abdominal cramping.VSS for pt.mendez with adequate UV,LOURDES drain patent.Abdominal incision with dressing in place.Scant drainage marked.Lung sounds clear.Daughter with pt.Explained plan of care for tonight,both with good understanding.MD informed of inadequate pain control.Frequency increased.

## 2017-09-27 NOTE — PLAN OF CARE
Problem: Patient Care Overview  Goal: Plan of Care/Patient Progress Review  Outcome: No Change  VSS. Pain previously managed with tylenol, ibuprofen, atarax, oxycodone. Pt did not request any pain meds overnight. Neuros intact. Numbness and tingling present in hands and feet. Rash on back, hydrocortisone cream for itching. Voiding spont, not saving; urine sample sent to lab. Up with 1 and walker to bathroom. Single-lumen PICC infusing /hr. NPO since midnight. Pre-op scrubs complete x2. Daughter at bedside. Plan for procedure this AM.

## 2017-09-27 NOTE — ANESTHESIA PROCEDURE NOTES
Peripheral Nerve Block Procedure Note    Staff:     Anesthesiologist:  EVELINA HARRISON    Resident/CRNA:  ANAYA WAGGONER    Block performed by resident/CRNA in the presence of a teaching physician    Location: OR AFTER induction  Procedure Start/Stop TImes:      9/27/2017 12:10 PM     9/27/2017 12:18 PM    patient identified, IV checked, site marked, risks and benefits discussed, informed consent, monitors and equipment checked, pre-op evaluation, at physician/surgeon's request and post-op pain management      Correct Patient: Yes      Correct Position: Yes      Correct Site: Yes      Correct Procedure: Yes      Correct Laterality:  Yes    Site Marked:  Yes  Procedure details:     Procedure:  Rectus Sheath    ASA:  3    Diagnosis:  Post operative pain    Laterality:  Bilateral    Position:  Supine    Sterile Prep: chloraprep, mask and sterile gloves      Local skin infiltration:  None    Needle:  Insulated and short bevel    Needle length (mm):  100    Ultrasound: Yes      Ultrasound used to identify targeted nerve, plexus, or vascular structure and placed a needle adjacent to it      Permanent Image entered into patiient's record      Abnormal pain on injection: No      Blood Aspirated: No      Paresthesias:  No    Bleeding at site: No      Bolus via:  Needle    Infusion Method:  Single Shot    Blood aspirated via catheter: No      Complications:  None  Assessment/Narrative:     Injection made incrementally with aspirations every (mL):  5

## 2017-09-27 NOTE — OP NOTE
[facility], [place]  Full Operative Note  Date of Service 9/27/17  Patient Lynne Llanos  MRN [id number]     Pre-operative diagnosis:  - Paraneoplastic syndrome  - Guillan Perry Syndrome  - Complex adnexal mass     Post-operative diagnosis:  - Benign on frozen  - Endometriosis  - Same as above  - Possible TOA vs. Diverticular abscess     Procedure:    Modified radical hysterectomy, Bilateral Salpingo-Oophrectomy, cystoscopy, proctoscopy, pelvic washings, bilateral ureterolysis.      Surgeon:      * Shahana Cordero MD - Primary     * Hernan Haider MD - Fellow     * Shahana Ospina MD    Anesthesia: Combined general with block                Estimated blood loss: 350 mL  UOP: 400 mL  IVF: 1400 mL cystalloid  Drains: Dre-Lazar    Specimens:                 ID Type Source Tests Collected by Time Destination   1 : pelvic washings  Washings Pelvis CYTOLOGY NON GYN Hernan Haider MD 9/27/2017  9:00 AM     A : utreus,cervix, bilateral  fallopian tubes and ovaries. Tissue Other SURGICAL PATHOLOGY EXAM Shahana Cordero MD 9/27/2017 10:16 AM        Findings: Dense adhesions of sigmoid and rectum to the uterus and bilateral adnexa, obliterating the posterior cul de sac. Adnexal densely adhered to bilateral pelvic sidewalls requiring bilateral ureterolysis. Normal ovarian and fallopian architecture is obscured bilaterally due to adhesive disease. Bilateral ureteral peristalsis visualized. Purulent abscess entered between bowel and left adnexa, consistent with TOA vs. Diverticular abscess. Excellent hemostasis. No passage of air on rectal insufflation with proctoscopy. Bilateral ureteral efflux on cystoscopy with no foreign body.     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 She was  admitted from outside hospital [facility] in [place], [place]. She had received IVIG x5 doses prior to transfer with ongoing symptomatology. Paraneoplastic panel was pending. She was counseled on options including the procedure noted above. Risks were discussed and consent signed.  Procedure:   Following informed consent, the patient was taken to the operating room where she was placed in dorsal lithotomy. She was prepped and draped in the normal sterile fashion. A patient safety time out was performed. A mendez catheter was placed.  A vertical midline incision was made with the scalpel from the pubic symphysis to 2cm above the umbilicus. The skin was incised with the scalpel, and the incision was carried to the level of the fascia with the electrocautery. The fascia was incised with the scalpel and extended superiorly and inferiorly with electrocautery. The peritoneum was identified, grasped, and entered sharply and carefully extended with electrocautery. Pelvic washings were collected. The bookwalter and retractors were placed and the bowel was packed away.  The uterus was grasped with kocher clamps on bilateral cornua. Extensive lysis of adhesions was performed to free the rectosigmoid from the posterior uterine body. This was accomplished with controlled traction and sharp dissection. Purulent fluid was encountered between the rectosigmoid adhesions and the uterus, concerning for possible diverticular abscess. The left round ligament was clamped, suture ligated, and divided with electrocautery. The ureter was identified on the medial leaf of the broad ligament, however extensive ureterolysis was performed for adequate ureteral visualization. The left infundibulopelvic ligament was skeletonized, doubly clamped, excised and tied with 2 ties of 0 Vicryl.   The right round ligament was clamped, suture ligated and divided with electrocautery. The retroperitoneal space was entered by dividing the peritoneum lateral to  infundibopelvic ligament. The ureter was identified along the medial leaf of the broad ligament. Extensive ureterolysis was performed for adequate ureteral visualization. The infundibulopelvic ligament was skeletonized, doubly clamped and divided. The pedicle was tied with 2 free ties of 0 Vicryl. Attention was then turned to the posterior cul de sac where the bowel was again sharply dissected carefully away from the posterior uterine body.   The anterior leaf of the broad ligament was then opened anteriorly towards the bladder to create the bladder flap. The uterine vessels were then skeletonized.  This was done in a similar fashion on the right. The uterine vessels on each side were clamped, divided, and suture ligated with 0 Vicryl after verifying visually and by palpation that the bilateral ureters were well below the level of the vessels. The bilateral cardinal and uterosacral ligaments were likewise clamped, divided and suture ligated. The cervicovaginal junction was then incised circumferentially with the Rodger scissors. Kocher clamps were placed at the bilateral vaginal cuff angles. The specimen was passed off the field. The vagina was swabbed with Betadine, and the pelvis was copiously irrigated with warm saline. The cuff was then closed with a interrupted figure of X sutures of 0 Vicryl.   Cystoscopy was performed revealing bilateral ureteral peristalsis. The abdomen was irrigated and proctoscopy was performed with rectal insufflation. No air passage was noted.   The bowel was unpacked and run again from the ileocecal valve to the ligament of Treitz. The dome of the liver, head of the pancreas, stomach, spleen, and bilateral hemidiaphragms were palpably normal. The small bowel was run from the ileocecal valve to the Ligament of Treitz, no obvious abnormalities or tumor implants were noted.   The abdomen and pelvis were copiously irrigated. The vaginal cuff and vascular pedicles were reinspected and  verified to be hemostatic. No residual ovarian tissue was present. Fascia was closed with two looped 0 PDS sutures. The subcutaneous tissues were irrigated with warm saline and reapproximated with a interrupted sutures of 2-0 Vicryl. Skin was closed with a running subcuticular stitch of 4-0 Monocryl and a sterile dressing was placed.   The patient tolerated the procedure well and was transferred to PACU extubated and in stable condition. All sponge, needle, and instrument counts were correct x3.     Dr. Shahana Cordero was present and scrubbed throughout the entire procedure.     Shahana Ospina MD  OB GYN PGY-3    Attending Attestation:  I was present and scrubbed for the entire surgical procedure.  I have reviewed and edited above note and agree with findings as documented.    Shahana Cordero MD  Gynecologic Oncology  [facility]

## 2017-09-27 NOTE — ANESTHESIA POSTPROCEDURE EVALUATION
Patient: Lynne Llanos    Procedure(s):  , modified radical Abdominal Hysterectomy, laparotomy exploratory,modified radical hysterctomy Bilateral Salpingo-Oophrectomy, cystoscopy, proctoscopy,pelvic washings, bilateral ureterolysis.  - Wound Class: II-Clean Contaminated   - Wound Class: II-Clean Contaminated   - Wound Class: II-Clean Contaminated   - Wound Class: II-Clean Contaminated    Diagnosis:pelvic mass   Diagnosis Additional Information: No value filed.    Anesthesia Type:  General, ETT, Periph. Nerve Block for postop pain    Note:  Anesthesia Post Evaluation    Patient location during evaluation: PACU  Patient participation: Able to fully participate in evaluation  Level of consciousness: awake and alert  Pain management: adequate  Airway patency: patent  Cardiovascular status: acceptable  Respiratory status: acceptable  Hydration status: acceptable  PONV: none             Last vitals:  Vitals:    09/26/17 0743 09/26/17 1553 09/26/17 2249   BP: 111/64 111/55 110/71   Pulse: 85 90 80   Resp: 18 18 18   Temp: 36.7  C (98  F) 36.2  C (97.2  F) 36.6  C (97.8  F)   SpO2: 97% 93% 96%         Electronically Signed By: Joe Galeano MD  September 27, 2017  12:52 PM

## 2017-09-27 NOTE — PLAN OF CARE
"Problem: Patient Care Overview  Goal: Plan of Care/Patient Progress Review  Outcome: No Change  /55 (BP Location: Left arm)  Pulse 90  Temp 97.2  F (36.2  C) (Oral)  Resp 18  Ht 1.753 m (5' 9\")  Wt 100.1 kg (220 lb 11.2 oz)  SpO2 93%  BMI 32.59 kg/m2     Pain managed with atarax, ibuprofen, oxycodone. Pt refused to take tylenol. Gave dilaudid once due to increased pain however per eMAR this should be the last resort for pain management. Neuros intact. Pt c/o of numbness/tingling in hands and feet. Pt uses walker to walk to bathroom. Refused to use wear socks for ambulation because she states \"I can't feel myself walk when I wear the socks.\" Rash on back acne-like, using cream for itching. Having diarrhea, held stool softner. Voiding spont, not saving. PICC saline locked. Regular diet tolerating well. NPO at MN for pelvic mass removal in the AM. First pre op scrub complete. Daughter at bedside.        "

## 2017-09-27 NOTE — PLAN OF CARE
Problem: Patient Care Overview  Goal: Plan of Care/Patient Progress Review  OT 7C: cancel, pt in OR for majority of day, will reschedule.

## 2017-09-27 NOTE — PLAN OF CARE
Problem: Patient Care Overview  Goal: Plan of Care/Patient Progress Review  PT/7C: Initial evaluation orders received, pt in OR in AM. Will reschedule evaluation for tomorrow. Cancel therapy this day

## 2017-09-27 NOTE — BRIEF OP NOTE
[facility], [place]    Brief Operative Note    Pre-operative diagnosis:  - Pelvic mass  - Suspected paraneoplastic syndrome  Post-operative diagnosis:  - Benign pelvic mass  - s/p procedure below  - Endometriosis  - Suspected TOA vs. Diverticular abscess  Procedure: Procedure(s):  Modified radical hysterectomy, Bilateral Salpingo-Oophrectomy, cystoscopy, proctoscopy, pelvic washings, bilateral ureterolysis.     - Wound Class: II-Clean Contaminated  Surgeon: Surgeon(s) and Role:     * Shahana Cordero MD - Primary     * Shahana Ospina MD     * Hernan Haider MD  Anesthesia: Combined General with Block   Estimated blood loss: 350 mL  UOP: 400 mL  IVF: 1400 mL cystalloid  Drains: Dre-Lazar  Specimens:   ID Type Source Tests Collected by Time Destination   1 : pelvic washings  Washings Pelvis CYTOLOGY NON GYN Hernan Haider MD 9/27/2017  9:00 AM    A : utreus,cervix, bilateral  fallopian tubes and ovaries. Tissue Other SURGICAL PATHOLOGY EXAM Shahana Cordero MD 9/27/2017 10:16 AM      Findings: Dense adhesions of sigmoid and rectum to the uterus and bilateral adnexa, obliterating the posterior cul de sac. Adnexal densely adhered to bilateral pelvic sidewalls requiring bilateral ureterolysis. Bilateral ureteral peristalsis visualized. Purulent abscess entered between bowel and left adnexa, consistent with TOA vs. Diverticular abscess. Excellent hemostasis. No passage of air on rectal insufflation with proctoscopy. Bilateral ureteral efflux on cystoscopy with no foreign body.   Complications: None.  Implants: None.    Shahana Ospina MD  OB GYN PGY-3  9/27/2017  12:15 PM

## 2017-09-27 NOTE — PROGRESS NOTES
"Gyn Postop Check    S: Reports poorly controlled pain, 20/10, but then laughs and says its more like 7/10 but still too much for her to handle. Pt is able to sleep. Sipping. No N/V. Denies dizziness, chest pain, SOB.    O:  Vital signs:  Temp: 97.9  F (36.6  C) Temp src: Oral BP: 124/85 Pulse: 80 Heart Rate: 100 Resp: 12 SpO2: 98 % O2 Device: None (Room air) Oxygen Delivery: 2 LPM Height: 175.3 cm (5' 9\") Weight: 100.1 kg (220 lb 11.2 oz)  Estimated body mass index is 32.59 kg/(m^2) as calculated from the following:    Height as of this encounter: 1.753 m (5' 9\").    Weight as of this encounter: 100.1 kg (220 lb 11.2 oz).      Gen: NAD, a/o  CV: RRR - minimally tachycardic to 102 max on my exams  Pulm: No increased WOB  Abd: Incision c/d/i, no guarding, no rebound; minimally tender  Ext: No edema, nontender      Intake/Output Summary (Last 24 hours) at 09/27/17 1822  Last data filed at 09/27/17 1614   Gross per 24 hour   Intake          3631.67 ml   Output             2070 ml   Net          1561.67 ml     UOP 30cc/hr, has not been charted since 1600    A/P: Lynne Llanos is a 47 year old POD#0 s/p modified radical LISA, BSO, cysto, procto, bilateral ureterolysis. Pain control issues postoperatively, likely secondary to chronic opioid use as she now states she was on opioids even prior to her admission to the OSH. Will start PCA as she has not responded to increased dilaudid bumps. Encouraged ibuprofen and tylenol usage as well - scheduled accordingly. No s/s of ongoing intra abdominal bleeding on exam and VSS stable.     Shahana Ospina MD  OB GYN PGY-3  9/27/2017  6:25 PM    "

## 2017-09-27 NOTE — ANESTHESIA CARE TRANSFER NOTE
Patient: Lynne Llanos    Procedure(s):  , modified radical Abdominal Hysterectomy, laparotomy exploratory,modified radical hysterctomy Bilateral Salpingo-Oophrectomy, cystoscopy, proctoscopy,pelvic washings, bilateral ureterolysis.  - Wound Class: II-Clean Contaminated   - Wound Class: II-Clean Contaminated   - Wound Class: II-Clean Contaminated   - Wound Class: II-Clean Contaminated    Diagnosis: pelvic mass   Diagnosis Additional Information: No value filed.    Anesthesia Type:   General, ETT, Periph. Nerve Block for postop pain     Note:  Airway :Face Mask  Patient transferred to:PACU        Vitals: (Last set prior to Anesthesia Care Transfer)    CRNA VITALS  9/27/2017 1155 - 9/27/2017 1253      9/27/2017             Pulse: 97    Ht Rate: 101    SpO2: 100 %    Resp Rate (observed): 11                Electronically Signed By: Joe Galeano MD  September 27, 2017  12:53 PM

## 2017-09-28 ENCOUNTER — APPOINTMENT (OUTPATIENT)
Dept: PHYSICAL THERAPY | Facility: CLINIC | Age: 47
DRG: 742 | End: 2017-09-28
Attending: NURSE PRACTITIONER
Payer: COMMERCIAL

## 2017-09-28 ENCOUNTER — APPOINTMENT (OUTPATIENT)
Dept: OCCUPATIONAL THERAPY | Facility: CLINIC | Age: 47
DRG: 742 | End: 2017-09-28
Attending: OBSTETRICS & GYNECOLOGY
Payer: COMMERCIAL

## 2017-09-28 LAB
ALBUMIN SERPL-MCNC: 2.1 G/DL (ref 3.4–5)
ALP SERPL-CCNC: 41 U/L (ref 40–150)
ALT SERPL W P-5'-P-CCNC: 21 U/L (ref 0–50)
ANION GAP SERPL CALCULATED.3IONS-SCNC: 7 MMOL/L (ref 3–14)
AST SERPL W P-5'-P-CCNC: 21 U/L (ref 0–45)
BASOPHILS # BLD AUTO: 0 10E9/L (ref 0–0.2)
BASOPHILS NFR BLD AUTO: 0.3 %
BILIRUB SERPL-MCNC: 0.3 MG/DL (ref 0.2–1.3)
BUN SERPL-MCNC: 9 MG/DL (ref 7–30)
CALCIUM SERPL-MCNC: 8.3 MG/DL (ref 8.5–10.1)
CANCER AG19-9 SERPL-ACNC: 41 U/ML (ref 0–37)
CHLORIDE SERPL-SCNC: 105 MMOL/L (ref 94–109)
CO2 SERPL-SCNC: 25 MMOL/L (ref 20–32)
COPATH REPORT: NORMAL
CREAT SERPL-MCNC: 0.75 MG/DL (ref 0.52–1.04)
DIFFERENTIAL METHOD BLD: ABNORMAL
EOSINOPHIL # BLD AUTO: 0 10E9/L (ref 0–0.7)
EOSINOPHIL NFR BLD AUTO: 0.3 %
ERYTHROCYTE [DISTWIDTH] IN BLOOD BY AUTOMATED COUNT: 19 % (ref 10–15)
GFR SERPL CREATININE-BSD FRML MDRD: 83 ML/MIN/1.7M2
GLUCOSE BLDC GLUCOMTR-MCNC: 99 MG/DL (ref 70–99)
GLUCOSE SERPL-MCNC: 112 MG/DL (ref 70–99)
HCT VFR BLD AUTO: 28.7 % (ref 35–47)
HGB BLD-MCNC: 9 G/DL (ref 11.7–15.7)
IMM GRANULOCYTES # BLD: 0 10E9/L (ref 0–0.4)
IMM GRANULOCYTES NFR BLD: 0.3 %
LYMPHOCYTES # BLD AUTO: 1.2 10E9/L (ref 0.8–5.3)
LYMPHOCYTES NFR BLD AUTO: 31.1 %
MCH RBC QN AUTO: 26.7 PG (ref 26.5–33)
MCHC RBC AUTO-ENTMCNC: 31.4 G/DL (ref 31.5–36.5)
MCV RBC AUTO: 85 FL (ref 78–100)
MONOCYTES # BLD AUTO: 0.5 10E9/L (ref 0–1.3)
MONOCYTES NFR BLD AUTO: 13.4 %
NEUTROPHILS # BLD AUTO: 2.1 10E9/L (ref 1.6–8.3)
NEUTROPHILS NFR BLD AUTO: 54.6 %
NRBC # BLD AUTO: 0 10*3/UL
NRBC BLD AUTO-RTO: 0 /100
PLATELET # BLD AUTO: 182 10E9/L (ref 150–450)
POTASSIUM SERPL-SCNC: 3.7 MMOL/L (ref 3.4–5.3)
PROT SERPL-MCNC: 6 G/DL (ref 6.8–8.8)
RBC # BLD AUTO: 3.37 10E12/L (ref 3.8–5.2)
SODIUM SERPL-SCNC: 138 MMOL/L (ref 133–144)
WBC # BLD AUTO: 3.9 10E9/L (ref 4–11)

## 2017-09-28 PROCEDURE — 36592 COLLECT BLOOD FROM PICC: CPT | Performed by: OBSTETRICS & GYNECOLOGY

## 2017-09-28 PROCEDURE — 25000128 H RX IP 250 OP 636: Performed by: OBSTETRICS & GYNECOLOGY

## 2017-09-28 PROCEDURE — 12000008 ZZH R&B INTERMEDIATE UMMC

## 2017-09-28 PROCEDURE — 25000125 ZZHC RX 250: Performed by: OBSTETRICS & GYNECOLOGY

## 2017-09-28 PROCEDURE — 00000146 ZZHCL STATISTIC GLUCOSE BY METER IP

## 2017-09-28 PROCEDURE — 25000132 ZZH RX MED GY IP 250 OP 250 PS 637: Performed by: OBSTETRICS & GYNECOLOGY

## 2017-09-28 PROCEDURE — 80053 COMPREHEN METABOLIC PANEL: CPT | Performed by: OBSTETRICS & GYNECOLOGY

## 2017-09-28 PROCEDURE — 25000132 ZZH RX MED GY IP 250 OP 250 PS 637: Performed by: NURSE PRACTITIONER

## 2017-09-28 PROCEDURE — 97530 THERAPEUTIC ACTIVITIES: CPT | Mod: GP | Performed by: PHYSICAL THERAPIST

## 2017-09-28 PROCEDURE — 97116 GAIT TRAINING THERAPY: CPT | Mod: GP | Performed by: PHYSICAL THERAPIST

## 2017-09-28 PROCEDURE — 94150 VITAL CAPACITY TEST: CPT

## 2017-09-28 PROCEDURE — 97166 OT EVAL MOD COMPLEX 45 MIN: CPT | Mod: GO

## 2017-09-28 PROCEDURE — 85025 COMPLETE CBC W/AUTO DIFF WBC: CPT | Performed by: OBSTETRICS & GYNECOLOGY

## 2017-09-28 PROCEDURE — 40000133 ZZH STATISTIC OT WARD VISIT

## 2017-09-28 PROCEDURE — 99232 SBSQ HOSP IP/OBS MODERATE 35: CPT | Mod: GC | Performed by: OBSTETRICS & GYNECOLOGY

## 2017-09-28 PROCEDURE — 25000128 H RX IP 250 OP 636: Performed by: STUDENT IN AN ORGANIZED HEALTH CARE EDUCATION/TRAINING PROGRAM

## 2017-09-28 PROCEDURE — 99231 SBSQ HOSP IP/OBS SF/LOW 25: CPT | Performed by: NURSE PRACTITIONER

## 2017-09-28 PROCEDURE — 97530 THERAPEUTIC ACTIVITIES: CPT | Mod: GO

## 2017-09-28 PROCEDURE — 97162 PT EVAL MOD COMPLEX 30 MIN: CPT | Mod: GP | Performed by: PHYSICAL THERAPIST

## 2017-09-28 PROCEDURE — 40000275 ZZH STATISTIC RCP TIME EA 10 MIN

## 2017-09-28 PROCEDURE — 40000802 ZZH SITE CHECK

## 2017-09-28 PROCEDURE — 40000193 ZZH STATISTIC PT WARD VISIT: Performed by: PHYSICAL THERAPIST

## 2017-09-28 PROCEDURE — 25000132 ZZH RX MED GY IP 250 OP 250 PS 637: Performed by: STUDENT IN AN ORGANIZED HEALTH CARE EDUCATION/TRAINING PROGRAM

## 2017-09-28 RX ORDER — CARBOXYMETHYLCELLULOSE SODIUM 5 MG/ML
1 SOLUTION/ DROPS OPHTHALMIC 3 TIMES DAILY PRN
Status: DISCONTINUED | OUTPATIENT
Start: 2017-09-28 | End: 2017-10-04 | Stop reason: HOSPADM

## 2017-09-28 RX ORDER — OXYCODONE HYDROCHLORIDE 5 MG/1
5-10 TABLET ORAL
Status: DISCONTINUED | OUTPATIENT
Start: 2017-09-28 | End: 2017-09-30

## 2017-09-28 RX ORDER — HYDROMORPHONE HYDROCHLORIDE 1 MG/ML
0.5 INJECTION, SOLUTION INTRAMUSCULAR; INTRAVENOUS; SUBCUTANEOUS ONCE
Status: COMPLETED | OUTPATIENT
Start: 2017-09-28 | End: 2017-09-28

## 2017-09-28 RX ORDER — HYDROMORPHONE HYDROCHLORIDE 1 MG/ML
.3-.5 INJECTION, SOLUTION INTRAMUSCULAR; INTRAVENOUS; SUBCUTANEOUS
Status: DISCONTINUED | OUTPATIENT
Start: 2017-09-28 | End: 2017-09-30

## 2017-09-28 RX ADMIN — IBUPROFEN 600 MG: 600 TABLET ORAL at 12:14

## 2017-09-28 RX ADMIN — OXYCODONE HYDROCHLORIDE 10 MG: 5 TABLET ORAL at 19:28

## 2017-09-28 RX ADMIN — ONDANSETRON 4 MG: 4 TABLET, ORALLY DISINTEGRATING ORAL at 09:12

## 2017-09-28 RX ADMIN — SENNOSIDES AND DOCUSATE SODIUM 1 TABLET: 8.6; 5 TABLET ORAL at 09:02

## 2017-09-28 RX ADMIN — METRONIDAZOLE 500 MG: 500 TABLET ORAL at 09:02

## 2017-09-28 RX ADMIN — OXYCODONE HYDROCHLORIDE 10 MG: 5 TABLET ORAL at 22:31

## 2017-09-28 RX ADMIN — OXYCODONE HYDROCHLORIDE 10 MG: 5 TABLET ORAL at 16:21

## 2017-09-28 RX ADMIN — ENOXAPARIN SODIUM 40 MG: 40 INJECTION SUBCUTANEOUS at 16:23

## 2017-09-28 RX ADMIN — IBUPROFEN 600 MG: 600 TABLET ORAL at 17:44

## 2017-09-28 RX ADMIN — GABAPENTIN 900 MG: 300 CAPSULE ORAL at 14:15

## 2017-09-28 RX ADMIN — Medication 120 MG: at 21:46

## 2017-09-28 RX ADMIN — RANITIDINE 150 MG: 150 TABLET ORAL at 22:32

## 2017-09-28 RX ADMIN — METRONIDAZOLE 500 MG: 500 TABLET ORAL at 01:07

## 2017-09-28 RX ADMIN — OXYCODONE HYDROCHLORIDE 10 MG: 5 TABLET ORAL at 13:15

## 2017-09-28 RX ADMIN — GABAPENTIN 900 MG: 300 CAPSULE ORAL at 09:01

## 2017-09-28 RX ADMIN — GABAPENTIN 900 MG: 300 CAPSULE ORAL at 21:13

## 2017-09-28 RX ADMIN — RANITIDINE 150 MG: 150 TABLET ORAL at 09:02

## 2017-09-28 RX ADMIN — HYDROXYZINE HYDROCHLORIDE 25 MG: 25 TABLET ORAL at 19:28

## 2017-09-28 RX ADMIN — SENNOSIDES AND DOCUSATE SODIUM 1 TABLET: 8.6; 5 TABLET ORAL at 21:13

## 2017-09-28 RX ADMIN — METRONIDAZOLE 500 MG: 500 TABLET ORAL at 17:44

## 2017-09-28 RX ADMIN — IBUPROFEN 600 MG: 600 TABLET ORAL at 01:07

## 2017-09-28 RX ADMIN — CIPROFLOXACIN HYDROCHLORIDE 500 MG: 500 TABLET, FILM COATED ORAL at 21:13

## 2017-09-28 RX ADMIN — HYDROMORPHONE HYDROCHLORIDE 0.5 MG: 1 INJECTION, SOLUTION INTRAMUSCULAR; INTRAVENOUS; SUBCUTANEOUS at 23:43

## 2017-09-28 RX ADMIN — CIPROFLOXACIN HYDROCHLORIDE 500 MG: 500 TABLET, FILM COATED ORAL at 09:02

## 2017-09-28 RX ADMIN — IBUPROFEN 600 MG: 600 TABLET ORAL at 06:15

## 2017-09-28 RX ADMIN — CARBOXYMETHYLCELLULOSE SODIUM 1 DROP: 5 SOLUTION/ DROPS OPHTHALMIC at 16:30

## 2017-09-28 RX ADMIN — HYDROXYZINE HYDROCHLORIDE 25 MG: 25 TABLET ORAL at 14:15

## 2017-09-28 RX ADMIN — HYDROMORPHONE HYDROCHLORIDE 0.5 MG: 1 INJECTION, SOLUTION INTRAMUSCULAR; INTRAVENOUS; SUBCUTANEOUS at 21:06

## 2017-09-28 ASSESSMENT — PAIN DESCRIPTION - DESCRIPTORS
DESCRIPTORS: ACHING;CRAMPING
DESCRIPTORS: SORE
DESCRIPTORS: ACHING;DULL;CONSTANT
DESCRIPTORS: SORE
DESCRIPTORS: CONSTANT;ACHING
DESCRIPTORS: SORE

## 2017-09-28 ASSESSMENT — ACTIVITIES OF DAILY LIVING (ADL)
IADL_COMMENTS: PT WAS PREVIOUSLY INDEPENDENT IN ALL IADLS DURING PLOF.
PREVIOUS_RESPONSIBILITIES: MEAL PREP;HOUSEKEEPING;LAUNDRY;SHOPPING;MEDICATION MANAGEMENT;FINANCES;DRIVING;WORK

## 2017-09-28 NOTE — PROGRESS NOTES
Gynecology Oncology Progress Note    Date: 9/29/2017     HD#5, POD# 2  Procedure: Modified Radical LISA, BSO, bilateral ureterolysis, cystoscopy, proctoscopy    Disease: Left complex cystic mass of left adnexa, benign endometriosis on frozen    24 hour events:   - PCA dilaudid weaned, working on adequate pain control with oral medications  - IV dilaudid bumps ordered overnight for pain  - Simethicone increased to 120mg & scheduled q6h  - Inspiratory force/FVC q12h monitoring normal overnight  - RN neuro checks spaced to q12h  - PT/OT consults recommend acute rehab therapy on discharge  - PMR consult ordered per Neuro recs  - Social work consult to facilitate placement on discharge  - Ni out & voiding spontaneously  - Increased frequency & low abdominal pain, UA ordered this AM  - Ovarian tumor markers inhibin B pending    Subjective: Patient is feeling better this AM. She feels her pain is just now being better controlled. Pain is still present and mainly in low abdomen. She is worried about transitioning off dilaudid too quickly this AM. She understands she needs to move toward PO meds and is willing to taper dilaudid later on this afternoon. Tolerating reguarl diet without nausea or vomiting. Not passing flatus and no BM. Voiding spontaneously without issues & frequently. Ambulating as able.      Objective:   Patient Vitals for the past 24 hrs:   BP Temp Temp src Pulse Heart Rate Resp SpO2   09/28/17 2236 131/79 97.6  F (36.4  C) Oral 88 88 16 95 %   09/28/17 1943 141/62 98  F (36.7  C) Oral 84 84 16 98 %   09/28/17 1440 115/69 97.9  F (36.6  C) Oral 98 98 16 93 %     EXAM:   General: appears fatigued but interactive, in NAD  CV: regular rate  Resp: no increased work of breathing on room air  Abdomen: soft, appropriately tender, mildly distended  Incision: sterile bandage in place with minimal shadowing, clean/dry otherwise with no evidence of surrounding erythema, drainage or induration.   MSK/Neuro: able  to move all extremities, gross movements are slow but intentional  Extremities: nontender, no edema, SCDs in place    I/O last 3 completed shifts:  In: 2127.17 [P.O.:650; I.V.:1477.17]  Out: 3725 [Urine:3700; Drains:25]      cc since MN, 150 cc/h    24h fluid balance:  -1598 mL    Drains: LLQ abdominal drain    Labs:   Hgb 11.1 (OSH) >12 > 11.1>9.0  Plts 188 > 188>182  Na 127 > 132 (OSH) > 135> 135>138  WBC 3.87 (OSH) > 3.2. > 2.3>3.9    Pending cultures: none; CSF culture negative at OSH    Imaging: none    Active Problem list:  Postoperative state  Suboptimal pain control  Left complex cystic adnexal mass, elevated - c/w endometriosis on frozen  Thick Endometrial stripe  Gullian-[place] syndrome  Fascial weakness with right facial droop  Neuropathy in BLE/BUE  Generalized deconditioning/weakness  Hyponatremia; resovled    Assessment/Plan: Lynne Llanos is a  48 yo female HD#5 after [facility] from [facility] ([place], [place]) where she was admitted for Guillan [place] Syndrome (felt secondary to 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 She was transferred for surgical management of left adnexal mass w/ concern for possible CA. Now POD#2 s/p modified radical LISA, BS, LO, poss RO, staging on 9/27 with Dr. Cordero. She is appropriate for early postop course, still working on increasing ambulating and return of bowel function.     Dz: Left complex, cystic adnexal mass, measuring 6 x 9 x10 cm, with thick EMS 1.5 cm. Elevated  with concern for malignancy requiring definitive management with surgery. Frozen pathology c/w endometriomas. On laparotomy, adhesions and purulent fluid concerning for diverticular abscess, less likely TOA.   FEN: ADAT, IVF LR 100ml/h until adequate PO intake. Hyponatremia prior to admit; now resolved. AM CMP stable.  Pain: s/p TAP blocks & dialudid PCA with  still poor pain control postop. IV dilaudid 0.5mg q2h overnight for pain control in addition to 10mg xxycodone q3h. She was on narcotics prior to admit and may have developed some tolerance. Will consider increasing oxycodone to 15mg versus obtaining pain consult for multimodal approach. Neurontin 900mg TID. Ibuprofen. LFTs at upper limit of normal; tylenol ordred given poor pain control. Will get CMP in AM to ensure not worsening.   Heme: Hgb & Plts stable postop. EBL 300mL. No symptoms of acute blood loss anemia. Continue Lovenox ppx for DVT prevention.  CV: No issues; EKG 9/25 NSR.   Pulm: Monitor negative inspiratory force/FVC q12h per neuro recs. Normal overnight.  GI: PRN bowel regimen & antiemetics. Zantac BID. Tums PRN.  : Normal Cr on admit & repeat stable. No other issues. S/p Ni (9/28) & voiding w/o issues. Given persistent increased frequency & low abdominal pain, UA ordered for this AM.   ID: Neutropenia- secondary to iatrogenic blood draws versus ongoing illness. Stable Plts & Hgb. Continue to monitor closely. Afebrile. 9/14 CSF Cx neg @ OSH. LLQ drain given concern for diverticular abscess on laparotomy, D#3/14 flagyl/cipro (start date 9/27; end date 10/10)  Endocrine: NI  Psych/Neuro/MSK: Guillain-T[place] Syndrome felt 2/2 paraneoplastic syndrome (L ovarian mass). CSF w/ elevated protein (OSH) & EMG here consistent with GBS diagnosis. MRI/CT (9/10) w/ cervical spine stenosis C6-7, L5-S1. Head CT neg (9/7). Lyme titers, IgA wnl @ OSH. Paraneoplasic panel sent previously to [place]; results not currently available.   - s/p 5d IVIG (9/18) w/ improvement in symptoms.  - Neurology following; appreciate ongoing input & cares.  - Neuropathy/weakness in both BLE/BUE; improving, continue gabapentin & close monitoring.  - Fascial muscle weakness R> L; facial droop on R; s/p PO course of prednisone 20mg/d; appreciate Neurology recs regarding if further PO steroids indicated.   - q12h Neuro checks per RN  -  Negative inspiratory force and FVCs q 12 hours, consider intubation if NIF >-20, or FVC <10-15 ml/kg ([id number])  - Hydrocortisone cream TID PRN for mild rash on back that was present prior to admission; stable.  - OT/PT assessments recommend acute rehab; PMR consult pending.     PPX: Lovenox PPX. SCDs too painful per pt & declines these. Encourage ambulation, IS.     Lines/Drains: PICC, LLQ drain    Disposition: Discharge when meeting discharge goals after surgery including tolerating regular diet without nausea/emesis, pain well controlled on PO medications, voiding/ambulating without issue, passing flatus, vitals within normal limits. Anticipate discharge on POD#3-4 (9/30-10/1).     Viky Ott MD, MPH  OB/GYN PGY4  9/29/2017 6:10 AM    Gyn Onc Pager [phone]      Provider Disclosure:   I agree with above History, Review of Systems, Physical exam and Plan. I have reviewed the content of the documentation and have edited it as needed. I have personally performed the services documented here and the documentation accurately represents those services and the decisions I have made.     Electronically signed by:   Melchor Dawkins MD   Gynecologic Oncology   [facility]

## 2017-09-28 NOTE — PLAN OF CARE
Problem: Patient Care Overview  Goal: Plan of Care/Patient Progress Review  Discharge Planner OT   Patient plan for discharge: ARU   Current status: Pt currently requires assistance for all functional mobility and ADLs, SBA for bed mobility, CGA for sit<>stand transfers, very close CGA/Unique for ambulation. MaxA for LE dressing. Pt fatigues very easily, ambulated ~50 feet before needing to return to bed.   Barriers to return to prior living situation: Pt currently requires 24 hour cares and assist for all functional activities, pt would not have adequate support in home envrionment to return home safely.   Recommendations for discharge: ARU  Rationale for recommendations: To increase safety and independence with ADLs and functional mobility, pt has recently been diagnosed with Guillan-[place] syndrome and underwent a total hysterectomy (POD #1), pt will require high intensity therapy course in order to return to PLOF. Pt is willing and motivated to improve function, agreeable to ARU recommendation.        Entered by: Lo Sloan 09/28/2017 3:09 PM

## 2017-09-28 NOTE — PROGRESS NOTES
"Social Work Services Progress Note    Hospital Day: 4  Date of Initial Social Work Evaluation:  9/26/17  Collaborated with:  Pt    Data:  Pt is 46 y/o female admitted to [facility] on 9/25/17.     Intervention:  SW met with Pt to see how she is coping following her surgery yesterday. Pt reports she is doing \"okay.\" Pt reports being in pain. Pt said that she worked with both PT and OT today and that it went \"alright.\" Pt was having cares complete and was unable to speak with SW more in depth at this time. Pt said it would be fine for SW to check in with her tomorrow morning. SW to continue following.    Assessment:  As above    Plan:    Anticipated Disposition:  TBD    Barriers to d/c plan:  Medical stability, DC recommendations needed    Follow Up:  SW to continue to follow and assist with DC plan.    BIRD Clayton, LGSW   Surgical Oncology Unit   [phone]  Pager: [phone]    "

## 2017-09-28 NOTE — PROGRESS NOTES
"REGIONAL ANESTHESIA PAIN SERVICE  SUBJECTIVE: Reports moderate pain control with analgesics and B/L rectus sheath nerve block injections.  Patient is  tolerating a diet,  denies nausea.  Denies any weakness, paresthesias, circumoral numbness, metallic taste or tinnitus.       Clinically Aligned Pain Assessment (CAPA):  Comfort (How is your pain?): Tolerable with discomfort  Change in Pain (Since your last medication/intervention?): About the same  Pain Control (How are your pain treatments working?):  Partially effective pain control    Medications related to Pain Management (Future)    Start     Dose/Rate Route Frequency Ordered Stop    09/27/17 2100  HYDROmorphone (DILAUDID) PCA 1 mg/mL       Intravenous PCA 09/27/17 2049 09/27/17 2000  senna-docusate (SENOKOT-S;PERICOLACE) 8.6-50 MG per tablet 1-2 tablet      1-2 tablet Oral 2 TIMES DAILY 09/27/17 1437 09/27/17 1730  HYDROmorphone (PF) (DILAUDID) injection 0.5 mg      0.5 mg Intravenous ONCE 09/27/17 1724      09/27/17 1445  acetaminophen (TYLENOL) tablet 650 mg      650 mg Oral EVERY 6 HOURS 09/27/17 1437      09/27/17 1445  bisacodyl (DULCOLAX) Suppository 10 mg      10 mg Rectal DAILY 09/27/17 1437      09/27/17 1445  ibuprofen (ADVIL/MOTRIN) tablet 600 mg      600 mg Oral EVERY 6 HOURS 09/27/17 1437      09/27/17 1437  lidocaine 1 % 1 mL      1 mL Other EVERY 1 HOUR PRN 09/27/17 1437      09/27/17 1437  lidocaine (LMX4) kit       Topical EVERY 1 HOUR PRN 09/27/17 1437      09/27/17 1437  bupivacaine liposome (EXPAREL) LONG ACTING injection was administered into the infiltration site to produce postsurgical analgesia. Duration of action is up to 72 hours, and other \"akhil\" medications should not be given for 96 hours with the exception of the lidocaine 5% patch (LIDODERM) and the lidocaine 10mg in potassium infusions. This entry is for INFORMATION ONLY.       Does not apply CONTINUOUS PRN 09/27/17 1437 10/01/17 1436    09/27/17 1437  simethicone " "(MYLICON) chewable tablet 80 mg      80 mg Oral 4 TIMES DAILY PRN 09/27/17 1437      09/26/17 1400  gabapentin (NEURONTIN) capsule 900 mg      900 mg Oral 3 TIMES DAILY 09/26/17 1238      09/25/17 1856  hydrOXYzine (ATARAX) tablet 25 mg      25 mg Oral EVERY 6 HOURS PRN 09/25/17 1856      09/25/17 1856  hydrOXYzine (ATARAX) tablet 50 mg      50 mg Oral EVERY 6 HOURS PRN 09/25/17 1856            OBJECTIVE:    Blood pressure 94/55, pulse 83, temperature 97.8  F (36.6  C), temperature source Oral, resp. rate 16, height 1.753 m (5' 9\"), weight 100.1 kg (220 lb 11.2 oz), SpO2 95 %.        ASSESSMENT/PLAN:    Lynne Llanos is a 47 year old female with h/o L) cystic mass of L) adnexa, now POD #1 s/p  LAPAROSCOPY DIAGNOSTIC (GYN)  LAPAROTOMY EXPLORATORY COMBINED HYSTERECTOMY TOTAL ABDOMINAL, SALPINGO-OOPHORECTOMY, NODE DISSECTION with single shot B/L rectus sheath nerve block injections.  Total bupivacaine 0.25% with epinephrine 1:200,000 20mL total, then liposome bupivacaine (Exparel) 1.3% 20mL total administered 9/27/17 for postop pain control.  Pt is ambulating without difficulty.  No weakness or paresthesias.  No evidence of adverse side effects associated with B/L rectus sheath nerve block injections.  Pt acheiving adequate pain control with nerve block and oral and IV analgesics.  Anticipate up to 72 hours of pain control with long-acting local anesthetic. Additionally, pt will continue to require opioid/nonopioid analgesics for visceral and muscle pain not controlled with local anesthetic.      - NO other local anesthetic use within 96 hours of liposome bupivacaine (Exparel)  - patient received verbal and written instructions about liposome bupivacaine and counseling about pharmacologic and nonpharmacologic measures for acute postoperative pain management  - please call if questions or concerns      ANGELINA Arndt CNP  Regional Anesthesia Pain Service  9/28/2017 12:46 PM    24 hour Job Code Pager.  For in-house " use only.     Dial * * [phone] and  [place]:  [phone]  [place]: [phone]  Peds:  [phone]  Enter call-back number  May text page using [employer], but NOT [employer].

## 2017-09-28 NOTE — PROGRESS NOTES
Gynecology Oncology Progress Note    Date: 9/28/2017     HD# 4, POD#1   Procedure: Modified Radical LISA, BSO, bilateral ureterolysis, cystoscopy, proctoscopy    Disease: Left complex cystic mass of left adnexa, benign endometriosis on frozen    24 hour events:   - S/p above surgery  - Started on flagyl/cipro due to concern for diverticular abscess, plan for 10-14 day course  - Poor pain control, PCA dilaudid in use- has used 0.4mg in last hour, no attempted doses not given, total 4.2mg used since PCA started  - LLQ drain with 45 cc output   - Ni in place, UOP adequate  - Inspiratory force/FVC q8h monitoring normal overnight (VC effort reported as fair but VC in mL still above desired threshhold)  -  Continued RN q8h neuro checks  - PT/OT consults pending, not yet seen  - Ovarian tumor markers  BHCG < 3, inhibin B pending    Subjective: Patient is feeling ok this AM. Pain more controlled. She notes her pain is still present and mainly in her abdomen. Tolerating clear liquid diet without nausea or vomiting. Passing flatus and no BM. Ni in place. Ambulating minimally.      Objective:   Patient Vitals for the past 24 hrs:   BP Temp Temp src Heart Rate Resp SpO2   09/27/17 2300 107/54 98  F (36.7  C) Oral 94 14 93 %   09/27/17 2035 128/73 - - 105 - -   09/27/17 1935 113/66 - - 110 - -   09/27/17 1735 124/85 - - 100 - 98 %   09/27/17 1635 123/83 - - 97 - -   09/27/17 1605 129/83 - - 98 - -   09/27/17 1535 118/78 - - 88 - -   09/27/17 1520 125/79 - - 93 - -   09/27/17 1505 130/86 - - 94 - -   09/27/17 1450 132/86 - - 97 - -   09/27/17 1435 127/85 97.9  F (36.6  C) Oral 93 12 96 %   09/27/17 1400 119/86 - - 96 16 99 %   09/27/17 1345 120/88 - - 92 16 99 %   09/27/17 1330 125/88 - - 91 13 100 %   09/27/17 1315 (!) 131/95 - - 95 12 100 %   09/27/17 1300 128/88 98.1  F (36.7  C) Oral 92 13 99 %   09/27/17 1245 123/87 - - 97 16 99 %   09/27/17 1230 126/88 98.7  F (37.1  C) Oral 101 17 100 %     EXAM:   General:  appears fatigued but interactive, in NAD  CV: regular rate  Resp: no increased work of breathing on room air  Abdomen: soft, appropriately tender, mildly distended  Incision: sterile bandage in place with minimal shadowing, clean/dry otherwise with no evidence of surrounding erythema, drainage or induration.   MSK/Neuro: right side weaker than left per RN neuro checks, able to move all extremities, gross movements are slow but intentional  Extremities: nontender, no edema, SCDs in place    I/O last 3 completed shifts:  In: 4751.67 [P.O.:620; I.V.:4131.67]  Out: 2870 [Urine:2475; Drains:45; Blood:350]      cc since MN, 50 cc/h    24h fluid balance:  +1882 mL    Drains: mendez, LLQ abdominal drain    Labs:   Hgb 11.1 (OSH) >12 > 11.1>9.0  Plts 188 > 188>182  Na 127 > 132 (OSH) > 135> 135>138  WBC 3.87 (OSH) > 3.2. > 2.3>3.9    Pending cultures: none; CSF culture negative at OSH    Imaging: none    Active Problem list:  Postoperative state  Left complex cystic adnexal mass, elevated - c/w endometriosis on frozen  Thick Endometrial stripe  Gullian-Rockwell syndrome  Fascial weakness with right facial droop  Neuropathy in BLE/BUE  Hyponatremia; resovled    Assessment/Plan: Lynne Llanos is a  46 yo female HD#4 after KELLY from [facility] ([place], [place]) where she was admitted for Guillan [place] Syndrome (felt secondary to 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 She was transferred for surgical management of left adnexal mass w/ concern for possible CA. Now POD#1 s/p modified radical LISA, BS, LO, poss RO, staging on 9/27 with Dr. Cordero. She is appropriate for early postop course, still working on ambulating, voiding, advancing diet and return of bowel function. EBL 300mL.    Dz: Left complex, cystic adnexal mass, measuring 6 x 9 x10 cm, with thick EMS 1.5 cm. Elevated  with  concern for malignancy requiring definitive management with surgery. Frozen pathology c/w endometriomas. On laparotomy, adhesions and purulent fluid concerning for diverticular abscess, less likely TOA.   FEN: ADAT, IVF LR 100ml/h until adequate PO intake. Hyponatremia prior to admit; now resolved. AM CMP stable.  Pain: s/p TAP blocks. PCA bumps 0.2-0.3mg, total 1.8 hourly due to poor pain control postop. Will wean PCA & resume Oxycodone once PCA dilaudid stopped. Neurontin 900mg TID. Ibuprofen. LFTs at upper limit of normal; tylenol ordred given poor pain control. Will get CMP in AM to ensure not worsening.   Heme: Hgb & Plts **. No symptoms of acute blood loss anemia. Continue Lovenox ppx for DVT prevention.  CV: No issues; EKG 9/25 NSR.   Pulm: Monitor negative inspiratory force/FVC q8h per neuro recs. Normal overnight but noted fair vital capacity effort (values still above threshold for intervention.)   GI: PRN bowel regimen & antiemetics. Zantac BID. Tums PRN.  : Normal Cr on admit & repeat 0.75. No other issues. Ni in place, will remove today and attempt voiding.   ID: Neutropenia- secondary to iatrogenic blood draws versus ongoing illness. Stable Plts & Hgb. Continue to monitor closely. Afebrile. 9/14 CSF Cx neg @ OSH. 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 CSF w/ elevated protein (OSH) & EMG here consistent with GBS diagnosis. MRI/CT (9/10) w/ cervical spine stenosis C6-7, L5-S1. Head CT neg (9/7). Lyme titers, IgA wnl @ OSH. Parneoplasic panel sent previously to [place]; results not currently available.   - s/p 5d IVIG (9/18) w/ small improvement in sx.  - Neurology following; appreciate ongoing input & cares.  - Neuropathy/weakness in both BLE/BUE; improving, continue gabapentin & close monitoring.  - Fascial  muscle weakness R> L; facial droop on R; s/p PO course of prednisone 20mg/d; appreciate Neurology recs regarding if further PO steroids indicated.   - q8h Neuro checks per RN  - Negative inspiratory force and FVCs q8 hours, consider intubation if NIF >-20, or FVC <10-15 ml/kg (2392-7634)  - Hydrocortisone cream TID PRN for mild rash on back that was present prior to admission; stable.  - Awaiting OT/PT assessments    PPX: Lovenox PPX. SCDs too painful per pt & declines these. Encourage ambulation, IS.     Lines/Drains: PICC, LLQ drain & mendez    Disposition: Discharge when meeting discharge goals after surgery including tolerating regular diet without nausea/emesis, pain well controlled on PO medications, voiding/ambulating without issue, passing flatus, vitals within normal limits. Anticipate discharge on POD#3-4 (9/30-10/1).     Viky Ott MD, MPH  OB/GYN PGY4  9/28/2017 5:56 AM   Gyn Onc Pager [phone]      Provider Disclosure:   I agree with above History, Review of Systems, Physical exam and Plan. I have reviewed the content of the documentation and have edited it as needed. I have personally performed the services documented here and the documentation accurately represents those services and the decisions I have made.     Electronically signed by:   Shahana Cordero MD   Gynecologic Oncology   [facility]

## 2017-09-28 NOTE — PLAN OF CARE
Problem: Patient Care Overview  Goal: Plan of Care/Patient Progress Review  PT 7C: Initial PT evaluation completed, treatment initiated. Pt presents with numbness/tingling in B hands/feet, impaired balance and LE weakness. Educated on abdominal precautions, pt transfers supine>sitting with SBA with HOB at 60 degree and use of bed railing. EOB<>BSC with CGA-min A. Pt ambulated 70 ft with FWW, CGA. Pt declines use of gait belt/socks despite education on importance for safety/infection control.     Recommend: ARU for intensive PT/OT to improve strength, balance, endurance and independence with functional activities/ADLs.        Refilled by [facility].

## 2017-09-28 NOTE — PLAN OF CARE
Problem: Patient Care Overview  Goal: Plan of Care/Patient Progress Review  Outcome: Therapy, progress towards functional goals is fair  VSS. Pain managed with PCA, scheduled ibuprofen. Pt refused getting OOB. Able to position in bed, bend knees, lift hips off bed. Denies nausea. LOURDES with dark red output. Ni in place, adequate output. Scant amount of serosanguinous vaginal drainage. Pt resting comfortably. Daughter at bedside. Continue POC.

## 2017-09-28 NOTE — PROGRESS NOTES
09/28/17 1400   Quick Adds   Type of Visit Initial Occupational Therapy Evaluation   Living Environment   Lives With other (see comments)  (roommates )   Living Arrangements mobile home   Home Accessibility stairs to enter home;tub/shower is not walk in;bed and bath on same level   Number of Stairs to Enter Home 4   Number of Stairs Within Home 0   Transportation Available car;family or friend will provide   Living Environment Comment Pt lives in a mobile home with 2 roommates, she states there are 4 stairs to enter and all needs can be met on single level of home.    Self-Care   Usual Activity Tolerance moderate   Current Activity Tolerance fair   Regular Exercise no   Equipment Currently Used at Home none   Activity/Exercise/Self-Care Comment Pt does not report usnig any AE at baseline in home envrionment.    Functional Level Prior   Ambulation 0-->independent   Transferring 0-->independent   Toileting 0-->independent   Bathing 0-->independent   Dressing 0-->independent   Eating 0-->independent   Communication 0-->understands/communicates without difficulty   Swallowing 0-->swallows foods/liquids without difficulty   Cognition 0 - no cognition issues reported   Fall history within last six months no   Which of the above functional risks had a recent onset or change? ambulation;transferring;toileting;bathing;dressing   Prior Functional Level Comment Pt states she was independent in all ADLs during PLOF but states functional mobility and ADLs have become much more difficult recently 2/2 significant increase in weakness and numbness in hands/feet. Pt states she noticed her functional abilties were declining when she could not transfer into her truck to go to work one day.    General Information   Onset of Illness/Injury or Date of Surgery - Date 09/25/17   Referring Physician Shahana Ospina MD   Patient/Family Goals Statement Regain strength, increase independence    Additional Occupational Profile Info/Pertinent  History of Current Problem Assessment/Plan: Lynne Llanos is a  48 yo female HD#3 after KELLY from [facility] ([place], [place]) where she was admitted for Guillan [place] Syndrome (felt secondary to 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 She was transferred for surgical management of left adnexal mass w/ concern for possible CA. Plan for LISA, BS, LO, poss RO, staging on 9/27 (POD #1).    Precautions/Limitations fall precautions;abdominal precautions   Weight-Bearing Status - LUE other (see comments)  (10#)   Weight-Bearing Status - RUE other (see comments)  (10#)   Weight-Bearing Status - LLE full weight-bearing   Weight-Bearing Status - RLE full weight-bearing   General Info Comments Activity: ambulate with assist    Cognitive Status Examination   Orientation orientation to person, place and time   Level of Consciousness lethargic/somnolent   Able to Follow Commands WNL/WFL   Personal Safety (Cognitive) mild impairment   Memory intact   Attention Quiet environment required   Cognitive Comment Pt is lethargic during eval, speaks slowly but this seems to have improved. No major cognition deficits noted but will continue to monitor progress very closely, will implement cognition testing if necessary.    Visual Perception   Visual Perception No deficits were identified   Visual Perception Comments Pt does not report any recent changes in vision.    Sensory Examination   Sensory Comments Pt has severe numbness in hands and feet, seems like this has localized distally, pt does not report sensory deficits anywhere else at this time.    Pain Assessment   Patient Currently in Pain Yes, see Vital Sign flowsheet   Integumentary/Edema   Integumentary/Edema no deficits were identifed   Posture   Posture not impaired   Range of Motion (ROM)   ROM Comment BUEs WFL    Strength   Strength Comments BUEs  WFL    Hand Strength   Hand Strength Comments bilateral  strength was not formally assessed but has diminished, still within functional limits at this time    Coordination   Upper Extremity Coordination No deficits were identified   Gross Motor Coordination Impaired    Fine Motor Coordination Not tested    Mobility   Bed Mobility Comments SBA    Transfer Skill: Bed to Chair/Chair to Bed   Level of [place]: Bed to Chair contact guard   Transfer Skill: Sit to Stand   Level of [place]: Sit/Stand contact guard   Balance   Balance Comments Pt has significant balance deficits, required close CGA and fww for short ambulation.    Instrumental Activities of Daily Living (IADL)   Previous Responsibilities meal prep;housekeeping;laundry;shopping;medication management;finances;driving;work   IADL Comments Pt was previously independent in all IADLs during PLOF.    General Therapy Interventions   Planned Therapy Interventions ADL retraining;IADL retraining   Clinical Impression   Criteria for Skilled Therapeutic Interventions Met yes, treatment indicated   OT Diagnosis Decreased ADL-I    Influenced by the following impairments general deconditioning, fatigue, pain, impaired balance and coordination, post-surgical precautions, sensory deficits    Assessment of Occupational Performance 5 or more Performance Deficits   Identified Performance Deficits bathing, dressing, toileting, ambulation, transferring, home management, community mobility   Clinical Decision Making (Complexity) Moderate complexity   Therapy Frequency daily   Predicted Duration of Therapy Intervention (days/wks) 10/5/2017   Anticipated Discharge Disposition Acute Rehabilitation Facility   Risks and Benefits of Treatment have been explained. Yes   Patient, Family & other staff in agreement with plan of care Yes   Clinical Impression Comments Pt presents to OT today with general deconditioning, fatigue, pain, impaired balance and coordination,  "post-surgical precautions, and sensory deficits, all leading to decreased ADL-I. Pt to benefit from skilled OT services to address the following problem list.    [facility] AM-PAC  \"6 Clicks\" Daily Activity Inpatient Short Form   1. Putting on and taking off regular lower body clothing? 2 - A Lot   2. Bathing (including washing, rinsing, drying)? 2 - A Lot   3. Toileting, which includes using toilet, bedpan or urinal? 3 - A Little   4. Putting on and taking off regular upper body clothing? 3 - A Little   5. Taking care of personal grooming such as brushing teeth? 3 - A Little   6. Eating meals? 4 - None   Daily Activity Raw Score (Score out of 24.Lower scores equate to lower levels of function) 17   Total Evaluation Time   Total Evaluation Time (Minutes) 7     "

## 2017-09-28 NOTE — PLAN OF CARE
"Problem: Patient Care Overview  Goal: Plan of Care/Patient Progress Review  Outcome: No Change  /73 (BP Location: Left arm)  Pulse 80  Temp 97.9  F (36.6  C) (Oral)  Resp 12  Ht 1.753 m (5' 9\")  Wt 100.1 kg (220 lb 11.2 oz)  SpO2 98%  BMI 32.59 kg/m2     At beginning of shift pt verbalized cramping/sharp abdominal pain. Was not relieved with PO oxycodone, ibuprofen and two doses of IV dilaudid. Pt was switched over to dilaudid PCA at 0.2mg q 10min. Denies nausea, tolerating a regular diet. Ate 50% of her meal tonight. PICC infusing MIVF. Abd inc covered with primapore with scant drainage. LOURDES to bulb suction with dark red drainage. Ni with adequate output. Pt having numbness and tingling in hands and feet. Refused to dangle. Compromised with pt and she stated she would be able to move back and forth in bed to removed hover mat. Daughter at bedside.       "

## 2017-09-28 NOTE — PLAN OF CARE
Problem: Patient Care Overview  Goal: Plan of Care/Patient Progress Review  Hypotensive this am,other vss for pt.hypotension improved this afternoon.Reluctant,later agreeable to participate in cares,ambulated with PT/OT.Incision is covered,old drainage marked.LOURDES patent with sero sanguinous drainage.Ni discontinued,voiding adequate amounts.hypo bowel sounds.Oxycodone started,PCA discontinued.Pt reluctant to d/c pca.Pt has been refusing tylenol,has taken ibuprofen.Teaching done re: benefits of po pain meds,tylenol.Pt states she isn't happy about the change but would try.Atarax 25 mg given for potentiation of oxycodone.Lung sounds clear,using IS with encouragement. Denies nausea,tolerating regular diet.

## 2017-09-28 NOTE — PROGRESS NOTES
" 09/28/17 1341   Quick Adds   Type of Visit Initial PT Evaluation   Living Environment   Lives With other (see comments)  (roommates)   Living Arrangements mobile home   Home Accessibility tub/shower is not walk in;stairs to enter home   Number of Stairs to Enter Home 4   Number of Stairs Within Home 0   Stair Railings at Home outside, present on right side   Transportation Available car;family or friend will provide   Living Environment Comment Pt lives with 2 roomates   Self-Care   Usual Activity Tolerance moderate   Current Activity Tolerance fair   Regular Exercise no   Equipment Currently Used at Home none   Activity/Exercise/Self-Care Comment Pt works at a grocery store, standing at cash register. Does  objects for scanning price, occassionally assisting with stocking grocery items when whe works overnights.   Functional Level Prior   Ambulation 0-->independent   Transferring 0-->independent   Toileting 0-->independent   Bathing 0-->independent   Dressing 0-->independent   Eating 0-->independent   Communication 0-->understands/communicates without difficulty   Swallowing 0-->swallows foods/liquids without difficulty   Cognition 0 - no cognition issues reported   Fall history within last six months no   Which of the above functional risks had a recent onset or change? ambulation;transferring   Prior Functional Level Comment Pt reports whe work up with numbness/tingling in her hands and feet ~ 3 weeks ago. Initially thought she had a \"pinched nerve\". Notied decrease in strength ~1 week ago. Also progressive increase in difficulty with ambulation 2/2 change in sensation in feet.    General Information   Onset of Illness/Injury or Date of Surgery - Date 09/26/17   Referring Physician Meredith Grossman MD   Patient/Family Goals Statement get stronger   Pertinent History of Current Problem (include personal factors and/or comorbidities that impact the POC) Pt is a 47 year old POD#0 s/p modified radical LISA, BSO, " cysto, procto, bilateral ureterolysis. Pain control issues postoperatively, likely secondary to chronic opioid use as she now states she was on opioids even prior to her admission to the OSH. Will start PCA as she has not responded to increased dilaudid bumps. Encouraged ibuprofen and tylenol usage as well - scheduled accordingly. No s/s of ongoing intra abdominal bleeding on exam and VSS stable. 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 Pt wearing abdominal binder.    General Info Comments Activity: Ambulate with assist 4x/day   Cognitive Status Examination   Orientation orientation to person, place and time   Level of Consciousness alert   Follows Commands and Answers Questions 100% of the time   Personal Safety and Judgment intact   Memory intact   Pain Assessment   Patient Currently in Pain Yes, see Vital Sign flowsheet   Integumentary/Edema   Integumentary/Edema Comments Non-pitting LE edema from MTP<>knee crease, pt reports chronic history, no change from baseline.    Posture    Posture Kyphosis  (trunk flexion in standing)   Range of Motion (ROM)   ROM Comment BLR ROM WFL   Strength   Strength Comments BLE strength ankle/knee ROM >3/5 per observation of functional strength, no MMT 2/2 abdominal precautions. Ptt ability to clear feet off ground likely indicating hip weakness.    Bed Mobility   Bed Mobility Comments Supine>sitting with SBA, pt puts HOB up to 60 degrees, uses hand railing, noted valsalva.    Transfer Skills   Transfer Comments Sit>stand with SBA, noted trunk flexion and valsalva.    Gait   Gait Comments Pt ambulated 5 ft with FWW, CGA. Noted decreased juma, decreased step length, WBOS, Pt completes 3 point gait pattern with FWW-moves FWW then LLE then RLE. Non-continuous stepping pattern.    Balance   Balance Comments Requires UE support for balance 2/2 change in LE  "sensation.    Sensory Examination   Sensory Perception Comments Numbness/tingling in hands/arms up to elbows bilaterally, numbness/tingling in feet/LEs up to mid shin bilaterally   General Therapy Interventions   Planned Therapy Interventions balance training;bed mobility training;progressive activity/exercise;home program guidelines;transfer training;strengthening   Clinical Impression   Criteria for Skilled Therapeutic Intervention yes, treatment indicated   PT Diagnosis impaired functional mobility   Influenced by the following impairments impaired integument at abdominal incision, impaired sensation, impaired balance, decreased LE strength, decreased activity tolerance.    Functional limitations due to impairments difficulty with bed mobility, transfers, ambulation, stairs   Clinical Presentation Evolving/Changing   Clinical Presentation Rationale medical complexity, PMH, previously independent   Clinical Decision Making (Complexity) Moderate complexity   Therapy Frequency` daily   Predicted Duration of Therapy Intervention (days/wks) 2 weeks   Anticipated Discharge Disposition Acute Rehabilitation Facility   Risk & Benefits of therapy have been explained Yes   Patient, Family & other staff in agreement with plan of care Yes   [facility] TM \"6 Clicks\"   2016, Trustees of [facility], under license to [employer].  All rights reserved.   6 Clicks Short Forms Basic Mobility Inpatient Short Form   [facility] AM-PAC  \"6 Clicks\" V.2 Basic Mobility Inpatient Short Form   1. Turning from your back to your side while in a flat bed without using bedrails? 3 - A Little   2. Moving from lying on your back to sitting on the side of a flat bed without using bedrails? 3 - A Little   3. Moving to and from a bed to a chair (including a wheelchair)? 3 - A Little   4. Standing up from a chair using your arms (e.g., wheelchair, or bedside chair)? 3 - A Little   5. To walk in hospital room? 3 - A Little "   6. Climbing 3-5 steps with a railing? 2 - A Lot   Basic Mobility Raw Score (Score out of 24.Lower scores equate to lower levels of function) 17   Total Evaluation Time   Total Evaluation Time (Minutes) 6

## 2017-09-29 ENCOUNTER — APPOINTMENT (OUTPATIENT)
Dept: PHYSICAL THERAPY | Facility: CLINIC | Age: 47
DRG: 742 | End: 2017-09-29
Attending: OBSTETRICS & GYNECOLOGY
Payer: COMMERCIAL

## 2017-09-29 ENCOUNTER — APPOINTMENT (OUTPATIENT)
Dept: OCCUPATIONAL THERAPY | Facility: CLINIC | Age: 47
DRG: 742 | End: 2017-09-29
Attending: OBSTETRICS & GYNECOLOGY
Payer: COMMERCIAL

## 2017-09-29 LAB
ALBUMIN UR-MCNC: NEGATIVE MG/DL
APPEARANCE UR: ABNORMAL
BILIRUB UR QL STRIP: NEGATIVE
COLOR UR AUTO: YELLOW
GLUCOSE UR STRIP-MCNC: NEGATIVE MG/DL
HGB UR QL STRIP: ABNORMAL
INHIBIN B SERPL-MCNC: 16 PG/ML
KETONES UR STRIP-MCNC: NEGATIVE MG/DL
LEUKOCYTE ESTERASE UR QL STRIP: ABNORMAL
MUCOUS THREADS #/AREA URNS LPF: PRESENT /LPF
NITRATE UR QL: NEGATIVE
PH UR STRIP: 7.5 PH (ref 5–7)
RBC #/AREA URNS AUTO: 4 /HPF (ref 0–2)
SOURCE: ABNORMAL
SP GR UR STRIP: 1.01 (ref 1–1.03)
SQUAMOUS #/AREA URNS AUTO: 9 /HPF (ref 0–1)
UROBILINOGEN UR STRIP-MCNC: NORMAL MG/DL (ref 0–2)
WBC #/AREA URNS AUTO: 2 /HPF (ref 0–2)

## 2017-09-29 PROCEDURE — 99232 SBSQ HOSP IP/OBS MODERATE 35: CPT | Mod: GC | Performed by: OBSTETRICS & GYNECOLOGY

## 2017-09-29 PROCEDURE — 40000275 ZZH STATISTIC RCP TIME EA 10 MIN

## 2017-09-29 PROCEDURE — 97530 THERAPEUTIC ACTIVITIES: CPT | Mod: GP | Performed by: PHYSICAL THERAPIST

## 2017-09-29 PROCEDURE — 25000132 ZZH RX MED GY IP 250 OP 250 PS 637: Performed by: OBSTETRICS & GYNECOLOGY

## 2017-09-29 PROCEDURE — 25000125 ZZHC RX 250: Performed by: OBSTETRICS & GYNECOLOGY

## 2017-09-29 PROCEDURE — 99207 ZZC CONSULT E&M CHANGED TO INITIAL LEVEL: CPT | Performed by: NURSE PRACTITIONER

## 2017-09-29 PROCEDURE — 25000128 H RX IP 250 OP 636: Performed by: STUDENT IN AN ORGANIZED HEALTH CARE EDUCATION/TRAINING PROGRAM

## 2017-09-29 PROCEDURE — 97116 GAIT TRAINING THERAPY: CPT | Mod: GP | Performed by: PHYSICAL THERAPIST

## 2017-09-29 PROCEDURE — 25000128 H RX IP 250 OP 636: Performed by: OBSTETRICS & GYNECOLOGY

## 2017-09-29 PROCEDURE — 81001 URINALYSIS AUTO W/SCOPE: CPT | Performed by: OBSTETRICS & GYNECOLOGY

## 2017-09-29 PROCEDURE — 12000001 ZZH R&B MED SURG/OB UMMC

## 2017-09-29 PROCEDURE — 40000193 ZZH STATISTIC PT WARD VISIT: Performed by: PHYSICAL THERAPIST

## 2017-09-29 PROCEDURE — 40000133 ZZH STATISTIC OT WARD VISIT

## 2017-09-29 PROCEDURE — 25000132 ZZH RX MED GY IP 250 OP 250 PS 637: Performed by: NURSE PRACTITIONER

## 2017-09-29 PROCEDURE — 94150 VITAL CAPACITY TEST: CPT

## 2017-09-29 PROCEDURE — 99223 1ST HOSP IP/OBS HIGH 75: CPT | Performed by: NURSE PRACTITIONER

## 2017-09-29 PROCEDURE — 40000802 ZZH SITE CHECK

## 2017-09-29 PROCEDURE — 97110 THERAPEUTIC EXERCISES: CPT | Mod: GP | Performed by: PHYSICAL THERAPIST

## 2017-09-29 PROCEDURE — 97535 SELF CARE MNGMENT TRAINING: CPT | Mod: GO

## 2017-09-29 PROCEDURE — 25000132 ZZH RX MED GY IP 250 OP 250 PS 637: Performed by: STUDENT IN AN ORGANIZED HEALTH CARE EDUCATION/TRAINING PROGRAM

## 2017-09-29 PROCEDURE — 40000558 ZZH STATISTIC CVC DRESSING CHANGE

## 2017-09-29 RX ORDER — GABAPENTIN 400 MG/1
1200 CAPSULE ORAL AT BEDTIME
Status: DISCONTINUED | OUTPATIENT
Start: 2017-09-29 | End: 2017-10-01

## 2017-09-29 RX ORDER — ACETAMINOPHEN 325 MG/1
650 TABLET ORAL EVERY 6 HOURS PRN
Status: DISCONTINUED | OUTPATIENT
Start: 2017-09-29 | End: 2017-10-04 | Stop reason: HOSPADM

## 2017-09-29 RX ORDER — DULOXETIN HYDROCHLORIDE 30 MG/1
30 CAPSULE, DELAYED RELEASE ORAL DAILY
Status: DISCONTINUED | OUTPATIENT
Start: 2017-09-29 | End: 2017-10-04 | Stop reason: HOSPADM

## 2017-09-29 RX ORDER — METHOCARBAMOL 500 MG/1
500 TABLET, FILM COATED ORAL EVERY 6 HOURS PRN
Status: DISCONTINUED | OUTPATIENT
Start: 2017-09-29 | End: 2017-10-04 | Stop reason: HOSPADM

## 2017-09-29 RX ORDER — GABAPENTIN 300 MG/1
900 CAPSULE ORAL 2 TIMES DAILY
Status: DISCONTINUED | OUTPATIENT
Start: 2017-09-30 | End: 2017-10-01

## 2017-09-29 RX ADMIN — OXYCODONE HYDROCHLORIDE 10 MG: 5 TABLET ORAL at 22:36

## 2017-09-29 RX ADMIN — HYDROMORPHONE HYDROCHLORIDE 0.5 MG: 1 INJECTION, SOLUTION INTRAMUSCULAR; INTRAVENOUS; SUBCUTANEOUS at 17:24

## 2017-09-29 RX ADMIN — METRONIDAZOLE 500 MG: 500 TABLET ORAL at 00:15

## 2017-09-29 RX ADMIN — CARBOXYMETHYLCELLULOSE SODIUM 1 DROP: 5 SOLUTION/ DROPS OPHTHALMIC at 18:49

## 2017-09-29 RX ADMIN — CARBOXYMETHYLCELLULOSE SODIUM 1 DROP: 5 SOLUTION/ DROPS OPHTHALMIC at 09:14

## 2017-09-29 RX ADMIN — Medication 120 MG: at 03:54

## 2017-09-29 RX ADMIN — CIPROFLOXACIN HYDROCHLORIDE 500 MG: 500 TABLET, FILM COATED ORAL at 08:31

## 2017-09-29 RX ADMIN — HYDROMORPHONE HYDROCHLORIDE 0.5 MG: 1 INJECTION, SOLUTION INTRAMUSCULAR; INTRAVENOUS; SUBCUTANEOUS at 21:56

## 2017-09-29 RX ADMIN — HYDROMORPHONE HYDROCHLORIDE 0.5 MG: 1 INJECTION, SOLUTION INTRAMUSCULAR; INTRAVENOUS; SUBCUTANEOUS at 06:18

## 2017-09-29 RX ADMIN — HYDROMORPHONE HYDROCHLORIDE 0.5 MG: 1 INJECTION, SOLUTION INTRAMUSCULAR; INTRAVENOUS; SUBCUTANEOUS at 01:46

## 2017-09-29 RX ADMIN — CIPROFLOXACIN HYDROCHLORIDE 500 MG: 500 TABLET, FILM COATED ORAL at 19:40

## 2017-09-29 RX ADMIN — HYDROMORPHONE HYDROCHLORIDE 0.5 MG: 1 INJECTION, SOLUTION INTRAMUSCULAR; INTRAVENOUS; SUBCUTANEOUS at 13:12

## 2017-09-29 RX ADMIN — HYDROMORPHONE HYDROCHLORIDE 0.5 MG: 1 INJECTION, SOLUTION INTRAMUSCULAR; INTRAVENOUS; SUBCUTANEOUS at 19:49

## 2017-09-29 RX ADMIN — RANITIDINE 150 MG: 150 TABLET ORAL at 22:36

## 2017-09-29 RX ADMIN — OXYCODONE HYDROCHLORIDE 10 MG: 5 TABLET ORAL at 05:22

## 2017-09-29 RX ADMIN — METRONIDAZOLE 500 MG: 500 TABLET ORAL at 08:31

## 2017-09-29 RX ADMIN — Medication 120 MG: at 14:34

## 2017-09-29 RX ADMIN — RANITIDINE 150 MG: 150 TABLET ORAL at 08:31

## 2017-09-29 RX ADMIN — HYDROMORPHONE HYDROCHLORIDE 0.5 MG: 1 INJECTION, SOLUTION INTRAMUSCULAR; INTRAVENOUS; SUBCUTANEOUS at 10:44

## 2017-09-29 RX ADMIN — MAGNESIUM HYDROXIDE 30 ML: 400 SUSPENSION ORAL at 00:15

## 2017-09-29 RX ADMIN — IBUPROFEN 600 MG: 600 TABLET ORAL at 06:18

## 2017-09-29 RX ADMIN — HYDROMORPHONE HYDROCHLORIDE 0.5 MG: 1 INJECTION, SOLUTION INTRAMUSCULAR; INTRAVENOUS; SUBCUTANEOUS at 03:54

## 2017-09-29 RX ADMIN — GABAPENTIN 900 MG: 300 CAPSULE ORAL at 14:34

## 2017-09-29 RX ADMIN — HYDROMORPHONE HYDROCHLORIDE 0.5 MG: 1 INJECTION, SOLUTION INTRAMUSCULAR; INTRAVENOUS; SUBCUTANEOUS at 08:31

## 2017-09-29 RX ADMIN — GABAPENTIN 900 MG: 300 CAPSULE ORAL at 08:30

## 2017-09-29 RX ADMIN — IBUPROFEN 600 MG: 600 TABLET ORAL at 00:15

## 2017-09-29 RX ADMIN — Medication 120 MG: at 08:31

## 2017-09-29 RX ADMIN — OXYCODONE HYDROCHLORIDE 10 MG: 5 TABLET ORAL at 09:14

## 2017-09-29 RX ADMIN — SENNOSIDES AND DOCUSATE SODIUM 2 TABLET: 8.6; 5 TABLET ORAL at 08:31

## 2017-09-29 RX ADMIN — MENTHOL 1 PATCH: 205.5 PATCH TOPICAL at 19:39

## 2017-09-29 RX ADMIN — Medication 120 MG: at 20:16

## 2017-09-29 RX ADMIN — GABAPENTIN 900 MG: 300 CAPSULE ORAL at 19:40

## 2017-09-29 RX ADMIN — OXYCODONE HYDROCHLORIDE 10 MG: 5 TABLET ORAL at 18:45

## 2017-09-29 RX ADMIN — HYDROMORPHONE HYDROCHLORIDE 0.5 MG: 1 INJECTION, SOLUTION INTRAMUSCULAR; INTRAVENOUS; SUBCUTANEOUS at 15:14

## 2017-09-29 RX ADMIN — IBUPROFEN 600 MG: 600 TABLET ORAL at 11:55

## 2017-09-29 RX ADMIN — OXYCODONE HYDROCHLORIDE 10 MG: 5 TABLET ORAL at 12:18

## 2017-09-29 RX ADMIN — METHOCARBAMOL 500 MG: 500 TABLET ORAL at 14:34

## 2017-09-29 RX ADMIN — SENNOSIDES AND DOCUSATE SODIUM 2 TABLET: 8.6; 5 TABLET ORAL at 19:40

## 2017-09-29 RX ADMIN — ONDANSETRON 4 MG: 4 TABLET, ORALLY DISINTEGRATING ORAL at 09:14

## 2017-09-29 RX ADMIN — IBUPROFEN 600 MG: 600 TABLET ORAL at 17:24

## 2017-09-29 RX ADMIN — OXYCODONE HYDROCHLORIDE 10 MG: 5 TABLET ORAL at 02:18

## 2017-09-29 RX ADMIN — METRONIDAZOLE 500 MG: 500 TABLET ORAL at 17:00

## 2017-09-29 RX ADMIN — ENOXAPARIN SODIUM 40 MG: 40 INJECTION SUBCUTANEOUS at 17:00

## 2017-09-29 RX ADMIN — OXYCODONE HYDROCHLORIDE 10 MG: 5 TABLET ORAL at 15:50

## 2017-09-29 ASSESSMENT — PAIN DESCRIPTION - DESCRIPTORS
DESCRIPTORS: PATIENT UNABLE TO DESCRIBE
DESCRIPTORS: ACHING
DESCRIPTORS: PATIENT UNABLE TO DESCRIBE
DESCRIPTORS: THROBBING
DESCRIPTORS: SHARP
DESCRIPTORS: ACHING;CONSTANT
DESCRIPTORS: CONSTANT;THROBBING;SPASM
DESCRIPTORS: CRAMPING
DESCRIPTORS: PATIENT UNABLE TO DESCRIBE
DESCRIPTORS: THROBBING;CONSTANT
DESCRIPTORS: PATIENT UNABLE TO DESCRIBE
DESCRIPTORS: PATIENT UNABLE TO DESCRIBE
DESCRIPTORS: ACHING;CONSTANT
DESCRIPTORS: CONSTANT;ACHING
DESCRIPTORS: SORE
DESCRIPTORS: PATIENT UNABLE TO DESCRIBE
DESCRIPTORS: CONSTANT;ACHING
DESCRIPTORS: PATIENT UNABLE TO DESCRIBE
DESCRIPTORS: ACHING;CONSTANT
DESCRIPTORS: PATIENT UNABLE TO DESCRIBE

## 2017-09-29 NOTE — PLAN OF CARE
Problem: Patient Care Overview  Goal: Plan of Care/Patient Progress Review  PT 7C: Requires review for abdominal precautions. Transfers with SBA, HOB inclined to 60 degrees and use of hand rail. Unable to transfer from HOB flat 2/2 pain in incisional area. Pt requires min A for donning briefs after toileting. Pt ambulated 100 ft with FWW, CGA. Pt heavily reliant on FWW for balance and off-weighting LEs 2/2 hypersensitivity in feet.     Recommend: significantly below baseline mobility level, would benefit from ARU for skilled PT/OT to progress strength, balance, endurance and overall independence with functional mobility and ADLs.

## 2017-09-29 NOTE — PLAN OF CARE
Problem: Patient Care Overview  Goal: Plan of Care/Patient Progress Review  Patient plan for discharge: ARU   Current status: Pt currently requires assistance for all functional mobility and ADLs, SBA for bed mobility, CGA for sit<>stand transfers, very close CGA/Unique for ambulation. MaxA for LE dressing. CGA for shower transfers, Unique for bathing, modA to dry off body, significantly fatigued with all standing mobility.  Barriers to return to prior living situation: Pt currently requires 24 hour cares and assist for all functional activities, pt would not have adequate support in home envrionment to return home safely.   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 Pt is willing to improve overall function, agreeable to ARU recommendation.        Entered by: Lo Sloan 09/29/2017 3:45 PM

## 2017-09-29 NOTE — PLAN OF CARE
"Problem: Patient Care Overview  Goal: Plan of Care/Patient Progress Review  Outcome: No Change  Showered and ambulated in muhammad with PT using walker and gait belt.  Neuros intact.  VSS.  Appetite fair and drinking fluids well. +Flatus this afternoon but no stool yet, declined suppository but did take senna as ordered.  Primary issue continues to be pain control . Reports abdominal pain but also has \"pins and needles\" pain in feet and hands. Discussed with patient that the narcotics likely won't help the hand/foot pain and this was also discussed by pain team.  Requests prn dilaudid and oxycodone when due.  Have needed to review policy in regard to proper spacing of narcotics.  Started on robaxin and reviewed with Lynne that per the Pain team they will be recommending weaning back of available dilaudid starting tomorrow in preparation for dc on Monday.        "

## 2017-09-29 NOTE — CONSULTS
[facility]   PM&R CONSULT    Consulting Provider: Dr. Zarco   Reason for Consult: Assessment of rehabilitation   Location of Patient: [facility]  Date of Encounter: 9/29/2017   Date of Admission: 9/25/2017      ASSESSMENT/PLAN:    Ms. 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 Biswas 9/26) and inability to ambulate where she was diagnosed with Guillain [place] Syndrome with concern for paraneoplastic syndrome given left adnexal complex, cystic mass and elevated . She is s/p IVIg x 5d (9/18) with improvement and radical LISA, BS, LO, poss RO and staging on 9/27 with Dr. Cordero. Overall, the patient's GBS recovery trajectory has been quite impressive based on history and exam today. She presents with decreased strength and endurance resulting in new functional impairments with mobility and ADLs secondary to above.     Pain control and bloating have been an ongoing issue for the patient post-operatively.  She is requiring IV dilaudid bumps. Her respiratory status has been stable. Patient is at risk for dysautonomia given GBS. Patient with recent surgery, opioid use and risk for dysautonomia, all of which could compound risk for adynamic ileus. Recommend ongoing monitoring of HR, BP, bowels, and UOP.     PM&R will reassess on Monday 10/2 given need for ongoing IV pain medications through 10/1. Recommend ongoing PT/OT and health psychology consult for adjustment to new functional impairments and health changes.      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 Biswas 9/26) and inability to  "ambulate where she was diagnosed with Guillain [place] Syndrome with concern for paraneoplastic syndrome given left adnexal complex, cystic mass and elevated . She is s/p IVIg x 5d (9/18) with improvement. She was transferred to [facility] for surgical management. She underwent radical LISA, BS, LO, poss RO and staging on 9/27 with Dr. Cordero. She has been followed by Neurology who do not recommend additional treatments for GBS at this time. Paraneoplastic panel to be sent if patient does have gyn malignancy for diagnosis purposes only - surgical path pending. NIF/FVC q12h have been stable.     Pain is ongoing with patient requiring dilaudid IV bumps, attempting to transition to PO with PCA weaned.  Pain is described as deep in abdomen related to surgery and bloating. She also has numbness and pain in her hands and feet which is difficult for her to describe. She has not appreciated any improvement in pain with gabapentin dose increase. She is bothered equally by her abdominal pain and the pain in her hands and feet. Patient is anxious about pain management. She was seen by pain today. She is voiding spontaneously. Bloating/gas have been an ongoing issue and she is taking simethicone. Denies BM since surgery. No n/v. Tolerating PO though not eating much. Not sleeping well 2/2 pain.       PREVIOUS LEVEL OF FUNCTION   Independent with mobility, ADLs and IADLs. Driving.       CURRENT FUNCTION   9/28 PT:  \"Pt presents with numbness/tingling in B hands/feet, impaired balance and LE weakness. Educated on abdominal precautions, pt transfers supine>sitting with SBA with HOB at 60 degree and use of bed railing. EOB<>BSC with CGA-min A. Pt ambulated 70 ft with FWW, CGA. Pt declines use of gait belt/socks despite education on importance for safety/infection control.\"       LIVING SITUATION/SUPPORT  Mobile home in [place]. 4 [address] no steps within the home. She lives with 2 roommates.     SOCIAL HISTORY  Social History     Social History " "    Marital status: Single     Spouse name: N/A     Number of children: N/A     Years of education: N/A     Social History Main Topics     Smoking status: None     Smokeless tobacco: None     Alcohol use None     Drug use: None     Sexual activity: Not Asked     Other Topics Concern     None     Social History Narrative   Works as a .      Past Medical History:  Past Medical History:   Diagnosis Date     Guillain Barr[place] syndrome Probable perineoplastic      Obesity      Pelvic mass        Current Medications:  Current Facility-Administered Medications   Medication     magnesium hydroxide (MILK OF MAGNESIA) suspension 30 mL     DULoxetine (CYMBALTA) EC capsule 30 mg     enoxaparin (LOVENOX) injection 40 mg     oxyCODONE (ROXICODONE) IR tablet 5-10 mg     carboxymethylcellulose (REFRESH PLUS) 0.5 % ophthalmic solution 1 drop     simethicone (MYLICON) chewable half-tab 120 mg     HYDROmorphone (PF) (DILAUDID) injection 0.3-0.5 mg     bupivacaine liposome (EXPAREL) LONG ACTING injection was administered into the infiltration site to produce postsurgical analgesia. Duration of action is up to 72 hours, and other \"akhil\" medications should not be given for 96 hours with the exception of the lidocaine 5% patch (LIDODERM) and the lidocaine 10mg in potassium infusions. This entry is for INFORMATION ONLY.     lidocaine 1 % 1 mL     lidocaine (LMX4) kit     sodium chloride (PF) 0.9% PF flush 3 mL     sodium chloride (PF) 0.9% PF flush 3 mL     acetaminophen (TYLENOL) tablet 650 mg     senna-docusate (SENOKOT-S;PERICOLACE) 8.6-50 MG per tablet 1-2 tablet     bisacodyl (DULCOLAX) Suppository 10 mg     benzocaine-menthol (CEPACOL) 15-3.6 MG lozenge 1-2 lozenge     ibuprofen (ADVIL/MOTRIN) tablet 600 mg     ondansetron (ZOFRAN-ODT) ODT tab 4 mg     prochlorperazine (COMPAZINE) injection 5-10 mg     ranitidine (ZANTAC) tablet 150 mg     metroNIDAZOLE (FLAGYL) tablet 500 mg     ciprofloxacin (CIPRO) tablet 500 mg " "    naloxone (NARCAN) injection 0.1-0.4 mg     HYDROmorphone (PF) (DILAUDID) injection 0.5 mg     gabapentin (NEURONTIN) capsule 900 mg       Review of Systems:  Total of ten systems reviewed, pertinent positives and negatives as follows  Instability with standing and walking.    Feels generally fatigued  Reports weakness in legs  Paraesthesias in BLE  No problems with bladder.   LBM  No chest pain. No cough or SOB.  No visual changes.   No headache or photophobia.   No nausea, or abdominal pain. Tolerating regular diet.   Slept poorly last night  No joint pain, muscle pain or swelling.  Mood is anxious  Remainder of the review of the systems was negative.        Labs   Lab Results   Component Value Date    WBC 3.9 (L) 09/28/2017    HGB 9.0 (L) 09/28/2017    HCT 28.7 (L) 09/28/2017    MCV 85 09/28/2017     09/28/2017     Lab Results   Component Value Date     09/28/2017    POTASSIUM 3.7 09/28/2017    CHLORIDE 105 09/28/2017    CO2 25 09/28/2017     (H) 09/28/2017     Lab Results   Component Value Date    GFRESTIMATED 83 09/28/2017    GFRESTBLACK >90 09/28/2017     Lab Results   Component Value Date    AST 21 09/28/2017    ALT 21 09/28/2017    ALKPHOS 41 09/28/2017    BILITOTAL 0.3 09/28/2017     No results found for: INR  Lab Results   Component Value Date    BUN 9 09/28/2017    CR 0.75 09/28/2017         ON EXAMINATION:  Vitals:    09/28/17 1440 09/28/17 1943 09/28/17 2236 09/29/17 0738   BP: 115/69 141/62 131/79 118/62   BP Location: Left arm Left arm Left arm Left arm   Pulse: 98 84 88 90   Resp: 16 16 16 16   Temp: 97.9  F (36.6  C) 98  F (36.7  C) 97.6  F (36.4  C) 98.1  F (36.7  C)   TempSrc: Oral Oral Oral Oral   SpO2: 93% 98% 95% 95%   Weight:       Height:           Physical Exam:  Blood pressure 118/62, pulse 90, temperature 98.1  F (36.7  C), temperature source Oral, resp. rate 16, height 1.753 m (5' 9\"), weight 100.1 kg (220 lb 11.2 oz), SpO2 95 %.    GEN: NAD, lying comfortably in " bed  She is alert, appropriate, cooperative  Speech is clear and regular rate  Comprehension is intact  HEENT: NCAT  RESPIRATORY: breathing easily on RA  MSK: full active and passive ROM at all major joints of the bilaterally upper and lower extremities.   No muscle atrophy noted  NEURO:   CRANIAL NERVES: Pupils equal, round and reactive to light. Extraocular movements full. Visual fields full.  Face moves symmetrically.  Tongue midline. Hearing intact to finger rubbing. Sensation intact on face.  Palate  elevates symmetrically.  SCM and shoulder shrug intact b/l.    Sensation: sensation to light thouch intact throughout with paresthesias in right L5 distribution   Strength: Patient with 4+/5 strength in BUE with shoulder abduction, elbow flex/ext, wrist ext, finger flexion    >3/5 strength in b/l hip flex. 4/5 strength with knee flexion/ext, ankle dorsi/plantar flexion, and EHL b/l   Gait: deferred   Cognition: fund of knowledge and train of thought appropriate  SKIN: no rashes or lesions noted.    EXT: no edema bilaterally, chronic stasis changes, no ulcers.   PSYCH: Flat affect.  Mood labile.       Thank you for the consult.     Patient discussed and examined with Dr. Mandi MELTON, Brit Raymond, saw this patient with my resident Dr. Gill and agree with her findings and plan of care as documented in her note above.     I personally reviewed the chart (vitals signs, medications, labs and imaging).     Time Spent on this Encounter   I spent 60 minutes on the unit/floor managing the care of Lynne Llanos. Over 50% of my time was spent counseling the patient and/or coordinating care regarding services listed in this not

## 2017-09-29 NOTE — PROGRESS NOTES
Social Work Services Progress Note    Hospital Day: 5  Date of Initial Social Work Evaluation:  9/26/17  Collaborated with:  Pt, Pt's dtr (More), GynOnc (Crista), [facility] (Jasmina o: [phone], c: [phone])    Data:  Pt is 46 y/o female admitted to [facility] on 9/25/17. Pt will need acute rehab placement at [facility]. Per GynOnc team, DC likely early next week.    Intervention:  SW met with Pt today to discuss plan moving forward. Pt would like to get back to [place], and is agreeable with recommendation for ARU. Pt continues to say that MD and SW at [facility] said that they would pay for her to return at [facility] from [facility].    SW called [facility] and spoke with TESS Brar who said they [facility] will not pay for Pt to return. She did say that an MD did provide Pt with a V[employer] card with potential funds to assist with transportation home. TESS explained to Zonia that IDT is recommending ARU and Zonia said that if that is the case, Pt's insurance may help to cover transportation cost d/t being transfer from acute to acute.    TESS asked Zonia if [facility] had an ARU that SW could make referral to and she provided SW with the following: [facility] ([phone]).    TESS called [facility] and spoke with Jasmina ([phone]) re: this patient. Per Jasmina, they may have a few beds available on Tuesday and they would be willing to review Pt. TESS faxed referral today to [phone]. Jasmina said that she will likely f/u with SW re: referral on Monday.     TESS updated Pt and Pt's dtr re: the above.    Assessment:  Pt will DC to ARU when medically stable and appropriate bed is found. Pt will likely need medical transportation at [facility].     Plan:    Anticipated Disposition:  Facility:  ARU (TBD)    Barriers to d/c plan:  Medical stability, bed availability, safe DC plan    Follow Up:  SW to continue to follow and assist with DC plan.    Nimo Reyes, BIRD, LGSW  7C Surgical Oncology Unit   [phone]  [phone]  Pager: [phone]

## 2017-09-29 NOTE — PROGRESS NOTES
"Brief Progress Note    S: Patient requesting to see MD regarding pain. She notes she has been in pain all day and feels like her pain is not being addressed. She felt that she was just being told that her pain shouldn't be this bad and that she was not being listed to. She expressed feeling sad and like she \"was a burden\" on people and her daughter. She felt bad that her daughter has to go through this with her and she is frustrated that this has all happened and that she is so limited in what she can do. She feels she is being pushed to do more and notes she is at her physical max with walking twice daily, stating \"it takes everything I have.\" She notes emotionally she has also been struggling to cope with this acute onset of severe illness. She notes in past she has been slower to recover initially but then does really well. She had a knee surgery in the past and notes her doctors were worried about her recovery as it took her 1-2 more days to wean off IV pain medications than the \"normal person.\" She notes once she was able to wean off then she did really well after. She feels like she is at her emotional breaking point as she has been in uncontrolled pain all day and notes she has felt little support/help from the care team. She felt she was just getting on top of her pain with the PCA Dilaudid when it was taken away. She also felt like she wasn't warned that the PCA would stop before it was taken off and instead feels she just keeps being told what she should do and that her pain should be better. She notes she wishes her pain was well controlled with her current medications but it is not and she \"doesn't want to be difficult\" so she has not been reporting her uncontrolled pain all day but instead has just been crying by herself in her room. She also notes having increased urination. No dysuria or difficulty emptying bladder. She slept well with PCA but hasn't been able to rest since stopping this due to poor " pain control. She doesn't want to take vistaril given she feels it won't help and it didn't help when she tried it. She feels if her pain was well controlled that she would be able to sleep. She also notes she took Ambien in the past and had issues with sleepwalking so she does not wish to use this again.     Patient Vitals for the past 24 hrs:   BP Temp Temp src Pulse Heart Rate Resp SpO2   09/28/17 1943 141/62 98  F (36.7  C) Oral 84 84 16 98 %   09/28/17 1440 115/69 97.9  F (36.6  C) Oral 98 98 16 93 %   09/28/17 0604 94/55 97.8  F (36.6  C) Oral 83 - 16 95 %   09/27/17 2300 107/54 98  F (36.7  C) Oral - 94 14 93 %     Gen: resting in bed, has intermittent twinges of sharp pain in low abdomen (appears c/w gas pain vs incisional pain), NAD  Resp: no increased work of breathing on room air  CV: regular rate  Abd: soft, mildly distended, appropriately tender near incision, LOURDES drain in LLQ with bandage in place= drainage watery red fluid  Incision: sterile bandage in place, minimal shadowing unchanged from previous, no erythema, induration or active drainage around sterile bandage  : clear urine when up to void during visit.   MSK: mobility greatly improved, gross motor movements still require much effort and are slowed but able to lift/ambulate and move in/out of bed with minimal assistance    A/P:   - Offered emotional support & expressed empathy for difficult circumstances, apologized for communication issues regarding PCA & poor pain control. Discussed role of emotional distress in pain control & that it can exacerbate physical pain. 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   - Discussed etiologies of pain both gas and incisional. Discussed reasoning behind stopping PCA as she is unable to have PO narcotics while on PCA and ultimately PO medications will be what gives her longer lasting pain control.  "She expressed understanding of this.   - Simethicone increased to 120 mg & scheduled q6h for gas pain  - Encouraged rest and continued efforts toward ambulation with understanding that her recovery may not look like a \"normal\" recovery given her additional illness  - IV dilaudid 0.5mg once now, 0.3-0.5mg q2h PRN overnight for breakthrough pain, will assess use overnight & determine appropriate PO equivalent for pain control  - Discontinued vistaril as it does not help patient & she does not want to take this  - Will add PRN sleep aids at patient's request  - Discussed plan for pain team consult if her pain is still not controlled with PO medications by 9/29  - Given urine clear, no dysuria, will hold off on UA for UTI eval at this time     Viky Ott MD, MPH  OB/GYN Resident G4  9/28/2017 7:55 PM     "

## 2017-09-29 NOTE — CONSULTS
Inpatient Pain Management Service: Consultation      DATE OF CONSULT: September 29, 2017      REASON FOR PAIN CONSULTATION:  Lynne Llanos is a 47 year old female I am seeing in consultation at the request of Crista Bennett CNP  for evaluation and recommendations for her acute postoperative pain.       CHIEF PAIN COMPLAINT: abdominal pain, and bilateral feet and hand pain      ASSESSMENT: KELLY from [facility] ([place], [place]) where she was admitted for Guillan [place] Syndrome (felt secondary to 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 She was transferred for surgical management of left adnexal mass w/ concern for possible CA.  1. Acute post-operative pain status post modified radical hysterectomy with bilateral saplingo-oopherectomy, cystoscopy, pelvic washings and bilateral ureterolysis, for treatment of left complex cystic mass of left adnexa on 09/27/17. Patient struggling with postoperative pain control, reports that she did well on PCA postoperatively and felt that she was transitioned over to orals too abruptly. Patient transitioned to oral opioids yesterday, and IV hydromorphone nurse administered doses started last night. Patient reports tolerable pain at this time and reports he pain is much improved since last night.   2. Need to consider functionality along with subjective pain report when assessing patient's pain.  3. 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  Patient reports bilateral hand and foot pain that started about 3 weeks ago outpatient. Patient was on gabapentin for treatment of her chronic right knee pain prior to Guillian-Sidney symptoms. Patient transitioned to pregabalin trial outpatient with no benefit and was restarted on her gabapentin by her PCP.  Need to maximize  "neuromodulating medications for treatment of Guillian-[place] associated pain as opioids are not first line therapy for neuropathic pain.   4. Chronic left knee pain with history of 4 knee surgeries,  including total knee arthroplasty.   5. Chronic low back pain, patient report a history of osteoarthritis of her low back, with chronic low back pain.  6. Depression/Anxiety, Patient reports a history of hospital admissions x2 for uncontrolled anxiety and depression while going through her divorce. She reports stable depression and anxiety prior to this admission. She denies being on any Psychiatric medications for management of her depression and anxiety prior to admission. She reports she feels her anxiety and depression are currently well controlled, but she did become emotional and cried x 2 during our visit today while discussing her current medical issues, and reports feeling a bit overwhelmed by all these medical issues, and reports she was a relatively healthy individual prior to this last month.   7. History of remote substance use, she reports she doesn't know exactly what it was but reports it was called \"Crank\"  (possibly methamphetamine) 20 years ago. She reports she used for 3 months then quit. She denies undergoing chemical dependency treatment, and reports she had to \"choose it or her \" at the time, and reports she chose her . She does make note that her  is now her ex- and he would always reference this period of drug use and accuse her of being on drugs. Denies any other illicit drug use.   8. BMI of 32, denies any CHILANGO diagnosis possibly undiagnosed CHILANGO, put patient at increased risk of respiratory depression with opioids.   9. Essentially opioid naive prior to admission. Patient denies any chronic opioid use prior to admission to OS and transfer to [facility] on 09/25/17. Patient is tolerating opioid tolerate doses postoperatively during this admission.  [place] and " [place]  negative for any recent chronic opioids. See details below regarding . Patient only received one prescription for oxycodone-acetaminophen from May 2017 until 09/12/17 as outlined below.    Outpatient medications related to pain prior to admission:   --09/12/17 oxycodone-acetaminophen 5-325mg PO #10 for a 2 day supply.   -09/10/17-carisoprodol 350 mg #10 for a 4 day supply   --no other opioids received since May of 2017,   --a few prescriptions from April-May 2017 for tramadol and hydrocodone-acetaminophen.     Outpatient opioids prescribed by: not receiving chronic opioids prior to admission  PRIMARY CARE PROVIDER: No Ref-Primary, Physician  PAIN SPECIALIST: NONE    [place]  database reviewed      TREATMENT RECOMMENDATIONS/PLAN:   1. Continue oxycodone 5-10 mg PO Q 3 hours PRN    Addendum: as oxycodone was increased over the weekend to oxycodone 10-15 mg PO Q 4 hours PRN.     Upon Discharge prescribe oxycodone 5 mg tablets for #30 tablets. Patient can take 10-15 mg PO Q 4 hours PRN x 24 hours then, change to oxycodone 5-10 mg PO Q 4 hours PRN,  taper off over the next 1-2 days by spacing out the time between doses.  For treatment of her acute postoperative abdominal pain.     Opioids are not intended for treatment of her Guillian-[place] bilateral foot and hand pain, Need to maximize neuromodulating medications for treatment of neuropathic pain as opioids are not first-line therapy for neuropathic pain.   2. Continue hydromorphone 0.3-0.5mg IV Q 2 hours x 24 hours    Tomorrow change to hydromorphone 0.3-0.5mg IV TID PRN (maximum of 3 doses/24 hours)    Leo 10/01/17 Discontinue IV hydromorphone.   3. Change acetaminophen to 1,000mg PO Q 8 hours scheduled. As LFTs are improving.     Continue to monitor LFTs.     Change to PRN upon discharge.     OF NOTE: patient is declining doses due to a history of her friend having an overdose of acetaminophen, and she is concerned about the risk of unintentional  acetaminophen overdose.     Would recommend maximizing acetaminophen if the patient is amenable.   4. Change gabapentin to 900mg, 900mg, 1200mg, PO Q 8 hours x 2 days     Then if tolerating continue to titrate by 300mg total daily dose every 2 days.     (maximum recommended total daily dose for neuropathic pain is 3600mg/day)  5. Start methocarbamol 500mg PO Q 6 hours PRN for abdominal wall muscle spasms. Patient has, abdominal myofascial evolvement   6. Discontinue ibuprofen for change to ketorolac, restart once course of ketorolac is finished.   7. Start ketorolac 15 mg IV Q 6 hours scheduled x 24-48 hours then re-initiate ibuprofen 600mg PO Q 6hours scheduled.   8. Start lidocaine 2.5% gabapentin 8% PLO gel, TD Q 8 hours, apply to bilateral feet, not to exceed 10 % body surface area.   9. Avoid any local anesthetic to the abdomen/area of rectus sheath block, for at least 96 hours after Exparel block was given, (Exparel given on 09/27 @ 12:26PM.)  10. Start menthol patch, 1 patch, TD Q 8 hours apply around incision not over incision.   11. Aggressive Bowel regimen for opioid induced constipation per primary team.   12. Patient would benefit from health psychology consult for coping, however, patient declined, this would be beneficial as the patient had labile emotions during our visit today surrounding her recent diagnosis of Guillian-[place].    13. IF anxiety/depression becomes uncontrolled consult psychiatry.   14. Addendum: Pain Service Will sign off at this time.         ASSESSMENT AND RECOMMENDATIONS DISCUSSED WITH: Crista Bennett CNP, plan discussed with Vanessa Og MD from the pain service.       Thank you for consulting the Inpatient Pain Management Service.   The above recommendations are to be acted upon at the primary team s discretion.    To reach us:  Mon - Friday 8 AM - 3 PM: Pager [phone]   After hours, weekends and holidays: Primary service should call [phone] for the on-call pain  "specialist    HISTORY OF PRESENT ILLNESS: Lynne Llanos is a 47 year old female with history of depression/anxiety, BMI of 32, remote substance use, KELLY from [facility] ([place], [place]) where she was admitted for Guillan [place] Syndrome (felt secondary to 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 She was transferred for surgical management of left adnexal mass w/ concern for possible cancer. Patient underwent modified radical hysterectomy with bilateral saplingo-oopherectomy, cystoscopy, pelvic washings and bilateral ureterolysis, for treatment of left complex cystic mass of left adnexa on 09/27/17. Patient struggling with pain control postoperatively, initially stated on oxycodone 5-10 mg PO Q 4 hours PRN, and then due to uncontrolled pain was changed to a hydromorphone PCA with 0.2-0.3mg Q 10 minute lockout with no continuous rate. Yesterday  09/28 patient transitioned off PCA over to orals with reports of uncontrolled pain, hydromorphone IV nurse administered doses started last night.     Today patient is resting in bed, appears comfortable, no acute distress. She reports her pain is tolerable with discomfort this morning after changes to opioid regimen last night. She reports her pain is located in her abdomen, bilateral feet and hands. She reports the pain in her abdomen is sharp, constant, and she reports the pain in her hand and feet is difficult to describe, and states is it \"pain and numbness\". She reports the combination of the IV Dilaudid and oxycodone is helping her pain. She reports her abdominal pain is worse with activity. She reports her pain in her feet is worse with ambulating. She is up ambulating with physical therapy, but reports it is difficult. She reports poor sleep last night due to pain and reports sleeping 2 hours total. She is tolerating a regular diet and " "reports her last bowel movement was about 3 days ago. She denies shortness of breath or chest pain. She admits to baseline numbness and weakness in her bilateral hands and feet, that has not changed per her baseline prior to admission.  She denies every following with a chronic pain clinic for her chronic knee pain, but does reports being on opioids, (hydrocodne-acetaminpohen) for a while after her right total knee arthroplasty. She reports tapering off due to she didn't want to be on chronic pain medications. She denies any chronic opioid use prior to her admission to hospital in [place], [place].    She report a remote history of hospital admissions x2 for uncontrolled anxiety and depression while going through her divorce. She reports stable depression and anxiety prior to this admission. She denies being on any Psychiatric medications for management of her depression and anxiety prior to admission. She reports she feels her anxiety and depression are currently well controlled, but she did become emotional and cried x 2 during our visit today while discussing her current medical issues, and reports feeling a bit overwhelmed by all these medical issues, and reports she was a relatively healthy individual prior to this last month.    she reports a remote history of substance use, \"20 years ago\". she doesn't know exactly what it was but reports it was called \"Crank\" (possibly methamphetamine). She reports she used for 3 months then quit. She denies undergoing chemical dependency treatment, and reports she had to \"choose it or her \" at the time, and reports she chose her . She does make note that her  is now her ex- and he would always reference this period of drug use and accuse her of being on drugs. Denies any other illicit drug use.        PAIN HISTORY:   Location: abdomen, bilateral feet and hands  Duration: constant  Quality of the pain: sharp, constatnt  Aggravating factors: activity, " walking, sitting up   Relieving factors : IV Dilaudid and oxycodone    CAPA (Clinically Aligned Pain Assessment)  Comfort (How is your pain?): Tolerable with discomfort  Change in Pain (Since your last medication/intervention?): Getting better  Pain Control (How are your pain treatments working?): Partially effective pain control  Functioning (Are you able to do activities to get better?) : Can do most things, but pain gets in the way of some   Sleep (Does your pain management allow you to sleep or rest?): Awake with occasional pain       FUNCTIONAL STATUS:  Change:      improving  Oral intake:     Regular  Bowel function:    Normal  Activity level:     Up with assistance ambulating in room  Ambulates in room   Sleep:      Reports 2 hours last night  Mood:      depressed affect, anxiety, labile and having problems adjusting      REVIEW OF 10 BODY SYSTEMS: 10 point ROS of systems including Constitutional, Eyes, Respiratory, Cardiovascular, Gastroenterology, Genitourinary, Integumentary, Musculoskeletal, Psychiatric were all negative except for pertinent positives noted in my HPI.       INPATIENT MEDICATIONS PERTINENT TO PAIN CONSULT:   Medications related to Pain Management (Future)    Start     Dose/Rate Route Frequency Ordered Stop    09/29/17 0900  DULoxetine (CYMBALTA) EC capsule 30 mg      30 mg Oral DAILY 09/29/17 0812      09/29/17 0001  magnesium hydroxide (MILK OF MAGNESIA) suspension 30 mL      30 mL Oral DAILY PRN 09/29/17 0001      09/28/17 2045  simethicone (MYLICON) chewable half-tab 120 mg      120 mg Oral EVERY 6 HOURS 09/28/17 2030 09/28/17 2025  HYDROmorphone (PF) (DILAUDID) injection 0.3-0.5 mg      0.3-0.5 mg Intravenous EVERY 2 HOURS PRN 09/28/17 2030 09/28/17 1251  oxyCODONE (ROXICODONE) IR tablet 5-10 mg      5-10 mg Oral EVERY 3 HOURS PRN 09/28/17 1251      09/27/17 2000  senna-docusate (SENOKOT-S;PERICOLACE) 8.6-50 MG per tablet 1-2 tablet      1-2 tablet Oral 2 TIMES DAILY 09/27/17  "1437      09/27/17 1730  HYDROmorphone (PF) (DILAUDID) injection 0.5 mg      0.5 mg Intravenous ONCE 09/27/17 1724      09/27/17 1445  acetaminophen (TYLENOL) tablet 650 mg      650 mg Oral EVERY 6 HOURS 09/27/17 1437      09/27/17 1445  bisacodyl (DULCOLAX) Suppository 10 mg      10 mg Rectal DAILY 09/27/17 1437      09/27/17 1445  ibuprofen (ADVIL/MOTRIN) tablet 600 mg      600 mg Oral EVERY 6 HOURS 09/27/17 1437      09/27/17 1437  lidocaine 1 % 1 mL      1 mL Other EVERY 1 HOUR PRN 09/27/17 1437      09/27/17 1437  lidocaine (LMX4) kit       Topical EVERY 1 HOUR PRN 09/27/17 1437      09/27/17 1437  bupivacaine liposome (EXPAREL) LONG ACTING injection was administered into the infiltration site to produce postsurgical analgesia. Duration of action is up to 72 hours, and other \"akhil\" medications should not be given for 96 hours with the exception of the lidocaine 5% patch (LIDODERM) and the lidocaine 10mg in potassium infusions. This entry is for INFORMATION ONLY.       Does not apply CONTINUOUS PRN 09/27/17 1437 10/01/17 1436    09/26/17 1400  gabapentin (NEURONTIN) capsule 900 mg      900 mg Oral 3 TIMES DAILY 09/26/17 1238            PAST PAIN TREATMENTS:   --pregabalin- no help  --gabapentin-helps  --hydrocodone-acetaminophen- helps  --carisoprodol- no help      CURRENT MEDICATIONS:   Current Facility-Administered Medications Ordered in Epic   Medication Dose Route Frequency Provider Last Rate Last Dose     magnesium hydroxide (MILK OF MAGNESIA) suspension 30 mL  30 mL Oral Daily PRN Viky Ott MD   30 mL at 09/29/17 0015     DULoxetine (CYMBALTA) EC capsule 30 mg  30 mg Oral Daily Mary Zarco MD         enoxaparin (LOVENOX) injection 40 mg  40 mg Subcutaneous Q24H Mary Zarco MD   40 mg at 09/28/17 1623     oxyCODONE (ROXICODONE) IR tablet 5-10 mg  5-10 mg Oral Q3H PRN Crista Bennett APRN CNP   10 mg at 09/29/17 0914     carboxymethylcellulose (REFRESH PLUS) 0.5 % ophthalmic " "solution 1 drop  1 drop Both Eyes TID PRN Mary Zarco MD   1 drop at 09/29/17 0914     simethicone (MYLICON) chewable half-tab 120 mg  120 mg Oral Q6H Viky Ott MD   120 mg at 09/29/17 0831     HYDROmorphone (PF) (DILAUDID) injection 0.3-0.5 mg  0.3-0.5 mg Intravenous Q2H PRN Viky Ott MD   0.5 mg at 09/29/17 0831     bupivacaine liposome (EXPAREL) LONG ACTING injection was administered into the infiltration site to produce postsurgical analgesia. Duration of action is up to 72 hours, and other \"akhil\" medications should not be given for 96 hours with the exception of the lidocaine 5% patch (LIDODERM) and the lidocaine 10mg in potassium infusions. This entry is for INFORMATION ONLY.   Does not apply Continuous PRN Kenney Long MD         lidocaine 1 % 1 mL  1 mL Other Q1H PRN Shahana Ospina MD         lidocaine (LMX4) kit   Topical Q1H PRN Shahana Ospina MD         sodium chloride (PF) 0.9% PF flush 3 mL  3 mL Intracatheter Q1H PRN Shahana Ospina MD   30 mL at 09/28/17 0819     sodium chloride (PF) 0.9% PF flush 3 mL  3 mL Intracatheter Q8H Shahana Ospina MD   3 mL at 09/29/17 0146     acetaminophen (TYLENOL) tablet 650 mg  650 mg Oral Q6H Shahana Ospina MD         senna-docusate (SENOKOT-S;PERICOLACE) 8.6-50 MG per tablet 1-2 tablet  1-2 tablet Oral BID Shahana Ospina MD   2 tablet at 09/29/17 0831     bisacodyl (DULCOLAX) Suppository 10 mg  10 mg Rectal Daily Shahana Ospina MD   10 mg at 09/29/17 0831     benzocaine-menthol (CEPACOL) 15-3.6 MG lozenge 1-2 lozenge  1-2 lozenge Buccal Q1H PRN Shahana Ospina MD         ibuprofen (ADVIL/MOTRIN) tablet 600 mg  600 mg Oral Q6H Shahana Ospina MD   600 mg at 09/29/17 0618     ondansetron (ZOFRAN-ODT) ODT tab 4 mg  4 mg Oral Q8H PRN Shahana Ospina MD   4 mg at 09/29/17 0914     prochlorperazine (COMPAZINE) injection 5-10 mg  5-10 mg Intravenous Q6H PRN Shahana Ospina MD         ranitidine (ZANTAC) tablet 150 mg  150 mg Oral BID Bhavesh" MD Shahana   150 mg at 09/29/17 0831     metroNIDAZOLE (FLAGYL) tablet 500 mg  500 mg Oral Q8H [facility] Shahana Ospina MD   500 mg at 09/29/17 0831     ciprofloxacin (CIPRO) tablet 500 mg  500 mg Oral Q12H [facility] Shahana Ospina MD   500 mg at 09/29/17 0831     naloxone (NARCAN) injection 0.1-0.4 mg  0.1-0.4 mg Intravenous Q2 Min PRN Shahana Ospina MD         HYDROmorphone (PF) (DILAUDID) injection 0.5 mg  0.5 mg Intravenous Once Shahana Ospina MD         gabapentin (NEURONTIN) capsule 900 mg  900 mg Oral TID Mary Zarco MD   900 mg at 09/29/17 0830     No current [facility]-ordered outpatient prescriptions on file.           HOME/PREVIOUS MEDICATIONS:   Prior to Admission medications    Medication Sig Start Date End Date Taking? Authorizing Provider   HYDROmorphone, STANDARD CONC, (DILAUDID) 1 MG/ML LIQD liquid Inject 1 mg into the vein every 2 hours as needed for moderate to severe pain   Yes Reported, Patient   GABAPENTIN PO Take 900 mg by mouth 2 times daily   Yes Reported, Patient   RaNITidine HCl (ZANTAC PO) Take 150 mg by mouth 2 times daily   Yes Reported, Patient   magnesium hydroxide (MILK OF MAGNESIA) 400 MG/5ML suspension Take 30 mLs by mouth 2 times daily   Yes Reported, Patient   senna-docusate (SENOKOT-S;PERICOLACE) 8.6-50 MG per tablet Take 2 tablets by mouth 2 times daily   Yes Reported, Patient   enoxaparin (LOVENOX) 40 MG/0.4ML injection Inject 40 mg Subcutaneous daily   Yes Reported, Patient   hydrocortisone 2.5 % cream Apply topically 2 times daily   Yes Reported, Patient         ALLERGIES:    Allergies   Allergen Reactions     Morphine Itching            PAST MEDICAL AND PSYCHIATRIC HISTORY:    Past Medical History:   Diagnosis Date     Guillain Barré syndrome Probable perineoplastic      Obesity      Pelvic mass            PAST SURGICAL HISTORY:   Past Surgical History:   Procedure Laterality Date     CYSTOSCOPY N/A 9/27/2017    Procedure: CYSTOSCOPY;;  Surgeon: Shahana Cordero MD;  Location:   OR     FOOT SURGERY Right      HYSTERECTOMY TOTAL ABDOMINAL, BILATERAL SALPINGO-OOPHORECTOMY, COMBINED Bilateral 9/27/2017    Procedure: COMBINED HYSTERECTOMY TOTAL ABDOMINAL, SALPINGO-OOPHORECTOMY;;  Surgeon: Shahana Cordero MD;  Location: [facility] OR     LAPAROTOMY EXPLORATORY N/A 9/27/2017    Procedure: LAPAROTOMY EXPLORATORY;  laparotomy exploratory,modified radical hysterectomy Bilateral Salpingo-Oophrectomy, cystoscopy, proctoscopy,pelvic washings, bilateral ureterolysis. ;  Surgeon: Shahana Cordero MD;  Location: [facility] OR     PROCTOSCOPY N/A 9/27/2017    Procedure: PROCTOSCOPY;;  Surgeon: Shahana Cordero MD;  Location: [facility] OR     Right knee surgery x 4       TUBAL LIGATION             FAMILY HISTORY: family history is not on file.      HEALTH & LIFESTYLE PRACTICES:   Tobacco:  has no tobacco history on file.  Alcohol:  has no alcohol history on file.  Illicit drugs:  has no drug history on file.       SOCIAL HISTORY: Lives in [place], [place]      LABORATORY VALUES:   Last Basic Metabolic Panel:  Lab Results   Component Value Date     09/28/2017      Lab Results   Component Value Date    POTASSIUM 3.7 09/28/2017     Lab Results   Component Value Date    CHLORIDE 105 09/28/2017     Lab Results   Component Value Date    BONNIE 8.3 09/28/2017     Lab Results   Component Value Date    CO2 25 09/28/2017     Lab Results   Component Value Date    BUN 9 09/28/2017     Lab Results   Component Value Date    CR 0.75 09/28/2017     Lab Results   Component Value Date     09/28/2017       CBC results:  Lab Results   Component Value Date    WBC 3.9 09/28/2017     Lab Results   Component Value Date    HGB 9.0 09/28/2017     Lab Results   Component Value Date    HCT 28.7 09/28/2017     Lab Results   Component Value Date     09/28/2017       LFT results:  Lab Results   Component Value Date    AST 21 09/28/2017     Lab Results   Component Value Date    ALT 21 09/28/2017     Lab Results   Component Value Date    ALKPHOS 41  09/28/2017         Lab Results   Component Value Date    ALBUMIN 2.1 09/28/2017         DIAGNOSTIC TESTS:   Surgical Pathology:   INTRAOPERATIVE DIAGNOSIS   PRELIMINARY INTRAOPERATIVE FROZEN SECTION DIAGNOSIS:   A. Uterus, cervix, bilateral tubes and ovaries:   - Frozens from left ovary (2X), presumed right ovary and myometrium:   negative for malignancy.     Labs above reviewed as well as additional relevant diagnostic studies from the EPIC record.       PHYSICAL EXAMINATION:  VITAL SIGNS:  B/P: 118/62, T: 98.1, P: 90, R: 16    CONSTITUTIONAL/GENERAL APPEARANCE: Alert and Oriented x3 and no acute distress  NECK: free range of motion  ENT: moist mucous membranes  EYES: PERRLA and Pupils 4mm  PULMONARY:non-labored, clear to auscultation and regular respiratory rate  CHEST WALL:symetrical chest wall expansion  CARDIOVASCULAR:Regular rate and rhythm, No peripheral edema, acyanotic and peripheral pulses +2 all extremities  ABDOMINAL:bowel sounds positive all quadrants and round, tender to light palpation generalized abdomen  MUSCULOSKELETAL/BACK/SPINE/EXTREMITIES: bilateral upper extremities and lower extremities gross motor function intact.    GAIT: not assessed, patient resting in bed  NEURO:  Sensation diminished bilateral hands and feet. Positive allodynia to right abdomen.   SKIN:  normal, warm, dry, wound midline abdomen well opposed, no erythema or drainage.   PSYCHIATRIC/BEHAVIORAL/OBSERVATIONS:     Judgment/Insight - intact   Orientation - oriented x 3, not acute distress   Memory - intact   Mood and affect - anxious, labile.      TIME SPENT: 70 minutes including 50minutes of face-to-face time counseling her  about her pain management treatment options, and coordinating care with the primary team.    Carlos Akhtar CNP  September 29, 2017, 10:32 AM  Inpatient Pain Management Service

## 2017-09-29 NOTE — PLAN OF CARE
Problem: Patient Care Overview  Goal: Plan of Care/Patient Progress Review  Outcome: Improving  VSS. Tolerating regular diet. Voiding large amounts. C/o gas/abdominal pain. Pain treated c Oxycodone, Ibuprofen, Neurontin. Patient refuses Tylenol. Patient stated pain 10/10 without relief. MD notified and saw patient. Simethicone and Dilaudid IV given c relief. Daughter at bedside. Patient appears to be resting comfortably after Dilaudid given. Continue c POC.

## 2017-09-29 NOTE — PLAN OF CARE
"Problem: Patient Care Overview  Goal: Plan of Care/Patient Progress Review  AVSS. C/o abdominal pain/gas pain, also pain from G-B syndrome in feet/generalized at times. Pt reports pain is improving overall this shift with heat, ibuprofen, oxycodone, simethicone and IV dilaudid all given when available overnight; per team, will re-assess pain management plan in am and likely do pain consult. Pt expressed anxiety r/t pain plan changing before she felt it was communicated to her and she is very nervous about any changes to come, \"just when things are finally getting better.\" Pt awake most of the night d/t pain, sleeping in very short intervals overnight. Up with SBA/1 to commode, voiding spont with adequate UOP and small amt bloody drainage (team aware). Midline incision covered with primapore, drainage re-marked (extended beyond original markings). Regular diet tolerated, denies nausea this shift. Daughter at bedside. Cont to provide emotional support, reassurance. Neuro checks ordered for 2x/day.     UA/UC due to be collected.   "

## 2017-09-30 ENCOUNTER — APPOINTMENT (OUTPATIENT)
Dept: OCCUPATIONAL THERAPY | Facility: CLINIC | Age: 47
DRG: 742 | End: 2017-09-30
Attending: OBSTETRICS & GYNECOLOGY
Payer: COMMERCIAL

## 2017-09-30 ENCOUNTER — APPOINTMENT (OUTPATIENT)
Dept: PHYSICAL THERAPY | Facility: CLINIC | Age: 47
DRG: 742 | End: 2017-09-30
Attending: OBSTETRICS & GYNECOLOGY
Payer: COMMERCIAL

## 2017-09-30 LAB
ANION GAP SERPL CALCULATED.3IONS-SCNC: 6 MMOL/L (ref 3–14)
BASOPHILS # BLD AUTO: 0 10E9/L (ref 0–0.2)
BASOPHILS NFR BLD AUTO: 1.2 %
BUN SERPL-MCNC: 8 MG/DL (ref 7–30)
CALCIUM SERPL-MCNC: 8.8 MG/DL (ref 8.5–10.1)
CHLORIDE SERPL-SCNC: 106 MMOL/L (ref 94–109)
CO2 SERPL-SCNC: 27 MMOL/L (ref 20–32)
CREAT SERPL-MCNC: 0.67 MG/DL (ref 0.52–1.04)
DIFFERENTIAL METHOD BLD: ABNORMAL
EOSINOPHIL # BLD AUTO: 0.1 10E9/L (ref 0–0.7)
EOSINOPHIL NFR BLD AUTO: 3.7 %
ERYTHROCYTE [DISTWIDTH] IN BLOOD BY AUTOMATED COUNT: 18.6 % (ref 10–15)
GFR SERPL CREATININE-BSD FRML MDRD: >90 ML/MIN/1.7M2
GLUCOSE SERPL-MCNC: 89 MG/DL (ref 70–99)
HCT VFR BLD AUTO: 30.8 % (ref 35–47)
HGB BLD-MCNC: 9.6 G/DL (ref 11.7–15.7)
IMM GRANULOCYTES # BLD: 0 10E9/L (ref 0–0.4)
IMM GRANULOCYTES NFR BLD: 0 %
LYMPHOCYTES # BLD AUTO: 0.6 10E9/L (ref 0.8–5.3)
LYMPHOCYTES NFR BLD AUTO: 37.3 %
MAGNESIUM SERPL-MCNC: 2.3 MG/DL (ref 1.6–2.3)
MCH RBC QN AUTO: 27 PG (ref 26.5–33)
MCHC RBC AUTO-ENTMCNC: 31.2 G/DL (ref 31.5–36.5)
MCV RBC AUTO: 87 FL (ref 78–100)
MONOCYTES # BLD AUTO: 0.4 10E9/L (ref 0–1.3)
MONOCYTES NFR BLD AUTO: 21.7 %
NEUTROPHILS # BLD AUTO: 0.6 10E9/L (ref 1.6–8.3)
NEUTROPHILS NFR BLD AUTO: 36.1 %
NRBC # BLD AUTO: 0 10*3/UL
NRBC BLD AUTO-RTO: 0 /100
PHOSPHATE SERPL-MCNC: 3.8 MG/DL (ref 2.5–4.5)
PLATELET # BLD AUTO: 217 10E9/L (ref 150–450)
PLATELET # BLD EST: ABNORMAL 10*3/UL
POTASSIUM SERPL-SCNC: 4.4 MMOL/L (ref 3.4–5.3)
RBC # BLD AUTO: 3.55 10E12/L (ref 3.8–5.2)
SODIUM SERPL-SCNC: 139 MMOL/L (ref 133–144)
WBC # BLD AUTO: 1.6 10E9/L (ref 4–11)

## 2017-09-30 PROCEDURE — 99232 SBSQ HOSP IP/OBS MODERATE 35: CPT | Mod: GC | Performed by: OBSTETRICS & GYNECOLOGY

## 2017-09-30 PROCEDURE — 25000132 ZZH RX MED GY IP 250 OP 250 PS 637: Performed by: OBSTETRICS & GYNECOLOGY

## 2017-09-30 PROCEDURE — 25000132 ZZH RX MED GY IP 250 OP 250 PS 637: Performed by: NURSE PRACTITIONER

## 2017-09-30 PROCEDURE — 80048 BASIC METABOLIC PNL TOTAL CA: CPT | Performed by: STUDENT IN AN ORGANIZED HEALTH CARE EDUCATION/TRAINING PROGRAM

## 2017-09-30 PROCEDURE — 12000001 ZZH R&B MED SURG/OB UMMC

## 2017-09-30 PROCEDURE — 94150 VITAL CAPACITY TEST: CPT

## 2017-09-30 PROCEDURE — 84100 ASSAY OF PHOSPHORUS: CPT | Performed by: STUDENT IN AN ORGANIZED HEALTH CARE EDUCATION/TRAINING PROGRAM

## 2017-09-30 PROCEDURE — 25000132 ZZH RX MED GY IP 250 OP 250 PS 637: Performed by: STUDENT IN AN ORGANIZED HEALTH CARE EDUCATION/TRAINING PROGRAM

## 2017-09-30 PROCEDURE — 97110 THERAPEUTIC EXERCISES: CPT | Mod: GO

## 2017-09-30 PROCEDURE — 85025 COMPLETE CBC W/AUTO DIFF WBC: CPT | Performed by: STUDENT IN AN ORGANIZED HEALTH CARE EDUCATION/TRAINING PROGRAM

## 2017-09-30 PROCEDURE — 25000128 H RX IP 250 OP 636: Performed by: OBSTETRICS & GYNECOLOGY

## 2017-09-30 PROCEDURE — 40000802 ZZH SITE CHECK

## 2017-09-30 PROCEDURE — 40000133 ZZH STATISTIC OT WARD VISIT

## 2017-09-30 PROCEDURE — 40000193 ZZH STATISTIC PT WARD VISIT: Performed by: PHYSICAL THERAPIST

## 2017-09-30 PROCEDURE — 97116 GAIT TRAINING THERAPY: CPT | Mod: GP | Performed by: PHYSICAL THERAPIST

## 2017-09-30 PROCEDURE — 25000128 H RX IP 250 OP 636: Performed by: STUDENT IN AN ORGANIZED HEALTH CARE EDUCATION/TRAINING PROGRAM

## 2017-09-30 PROCEDURE — 83735 ASSAY OF MAGNESIUM: CPT | Performed by: STUDENT IN AN ORGANIZED HEALTH CARE EDUCATION/TRAINING PROGRAM

## 2017-09-30 PROCEDURE — 97535 SELF CARE MNGMENT TRAINING: CPT | Mod: GO

## 2017-09-30 RX ORDER — HYDROMORPHONE HYDROCHLORIDE 1 MG/ML
.3-.5 INJECTION, SOLUTION INTRAMUSCULAR; INTRAVENOUS; SUBCUTANEOUS 3 TIMES DAILY
Status: DISCONTINUED | OUTPATIENT
Start: 2017-09-30 | End: 2017-10-01

## 2017-09-30 RX ORDER — OXYCODONE HYDROCHLORIDE 5 MG/1
10-15 TABLET ORAL EVERY 4 HOURS PRN
Status: DISCONTINUED | OUTPATIENT
Start: 2017-09-30 | End: 2017-10-01

## 2017-09-30 RX ADMIN — IBUPROFEN 600 MG: 600 TABLET ORAL at 17:46

## 2017-09-30 RX ADMIN — Medication 120 MG: at 11:22

## 2017-09-30 RX ADMIN — Medication: at 22:38

## 2017-09-30 RX ADMIN — HYDROMORPHONE HYDROCHLORIDE 0.5 MG: 1 INJECTION, SOLUTION INTRAMUSCULAR; INTRAVENOUS; SUBCUTANEOUS at 04:05

## 2017-09-30 RX ADMIN — METHOCARBAMOL 500 MG: 500 TABLET ORAL at 00:24

## 2017-09-30 RX ADMIN — MAGNESIUM HYDROXIDE 30 ML: 400 SUSPENSION ORAL at 13:08

## 2017-09-30 RX ADMIN — METHOCARBAMOL 500 MG: 500 TABLET ORAL at 19:03

## 2017-09-30 RX ADMIN — OXYCODONE HYDROCHLORIDE 10 MG: 5 TABLET ORAL at 01:52

## 2017-09-30 RX ADMIN — Medication 120 MG: at 04:05

## 2017-09-30 RX ADMIN — MAGNESIUM HYDROXIDE 30 ML: 400 SUSPENSION ORAL at 00:51

## 2017-09-30 RX ADMIN — METHOCARBAMOL 500 MG: 500 TABLET ORAL at 06:23

## 2017-09-30 RX ADMIN — Medication 120 MG: at 22:38

## 2017-09-30 RX ADMIN — OXYCODONE HYDROCHLORIDE 10 MG: 5 TABLET ORAL at 14:45

## 2017-09-30 RX ADMIN — Medication 0.5 MG: at 12:34

## 2017-09-30 RX ADMIN — OXYCODONE HYDROCHLORIDE 10 MG: 5 TABLET ORAL at 17:46

## 2017-09-30 RX ADMIN — GABAPENTIN 900 MG: 300 CAPSULE ORAL at 08:25

## 2017-09-30 RX ADMIN — HYDROMORPHONE HYDROCHLORIDE 0.5 MG: 1 INJECTION, SOLUTION INTRAMUSCULAR; INTRAVENOUS; SUBCUTANEOUS at 06:17

## 2017-09-30 RX ADMIN — RANITIDINE 150 MG: 150 TABLET ORAL at 08:25

## 2017-09-30 RX ADMIN — OXYCODONE HYDROCHLORIDE 5 MG: 5 TABLET ORAL at 22:37

## 2017-09-30 RX ADMIN — METHOCARBAMOL 500 MG: 500 TABLET ORAL at 13:08

## 2017-09-30 RX ADMIN — METRONIDAZOLE 500 MG: 500 TABLET ORAL at 01:52

## 2017-09-30 RX ADMIN — MENTHOL 1 PATCH: 205.5 PATCH TOPICAL at 04:12

## 2017-09-30 RX ADMIN — Medication 0.5 MG: at 20:21

## 2017-09-30 RX ADMIN — OXYCODONE HYDROCHLORIDE 10 MG: 5 TABLET ORAL at 04:59

## 2017-09-30 RX ADMIN — CARBOXYMETHYLCELLULOSE SODIUM 1 DROP: 5 SOLUTION/ DROPS OPHTHALMIC at 16:51

## 2017-09-30 RX ADMIN — IBUPROFEN 600 MG: 600 TABLET ORAL at 06:17

## 2017-09-30 RX ADMIN — METRONIDAZOLE 500 MG: 500 TABLET ORAL at 16:48

## 2017-09-30 RX ADMIN — Medication 120 MG: at 17:47

## 2017-09-30 RX ADMIN — IBUPROFEN 600 MG: 600 TABLET ORAL at 00:24

## 2017-09-30 RX ADMIN — Medication: at 16:49

## 2017-09-30 RX ADMIN — SENNOSIDES AND DOCUSATE SODIUM 2 TABLET: 8.6; 5 TABLET ORAL at 08:25

## 2017-09-30 RX ADMIN — GABAPENTIN 1200 MG: 400 CAPSULE ORAL at 22:37

## 2017-09-30 RX ADMIN — CIPROFLOXACIN HYDROCHLORIDE 500 MG: 500 TABLET, FILM COATED ORAL at 20:20

## 2017-09-30 RX ADMIN — CIPROFLOXACIN HYDROCHLORIDE 500 MG: 500 TABLET, FILM COATED ORAL at 08:25

## 2017-09-30 RX ADMIN — IBUPROFEN 600 MG: 600 TABLET ORAL at 12:33

## 2017-09-30 RX ADMIN — ENOXAPARIN SODIUM 40 MG: 40 INJECTION SUBCUTANEOUS at 16:48

## 2017-09-30 RX ADMIN — OXYCODONE HYDROCHLORIDE 10 MG: 5 TABLET ORAL at 21:20

## 2017-09-30 RX ADMIN — HYDROMORPHONE HYDROCHLORIDE 0.5 MG: 1 INJECTION, SOLUTION INTRAMUSCULAR; INTRAVENOUS; SUBCUTANEOUS at 00:24

## 2017-09-30 RX ADMIN — OXYCODONE HYDROCHLORIDE 10 MG: 5 TABLET ORAL at 08:24

## 2017-09-30 RX ADMIN — OXYCODONE HYDROCHLORIDE 10 MG: 5 TABLET ORAL at 11:20

## 2017-09-30 RX ADMIN — METRONIDAZOLE 500 MG: 500 TABLET ORAL at 08:25

## 2017-09-30 RX ADMIN — GABAPENTIN 900 MG: 300 CAPSULE ORAL at 20:20

## 2017-09-30 RX ADMIN — RANITIDINE 150 MG: 150 TABLET ORAL at 20:20

## 2017-09-30 ASSESSMENT — PAIN DESCRIPTION - DESCRIPTORS
DESCRIPTORS: ACHING;SORE
DESCRIPTORS: CONSTANT;SHARP
DESCRIPTORS: ACHING;SORE
DESCRIPTORS: ACHING;SORE
DESCRIPTORS: SHARP
DESCRIPTORS: SHARP;CONSTANT
DESCRIPTORS: PATIENT UNABLE TO DESCRIBE
DESCRIPTORS: CONSTANT

## 2017-09-30 NOTE — PROGRESS NOTES
"      Gynecology Oncology Progress Note    Date: 9/30/2017     HD#6, POD#3  Procedure: Modified Radical LISA, BSO, bilateral ureterolysis, cystoscopy, proctoscopy    Disease: Left complex cystic mass of left adnexa, benign endometriosis on frozen    24 hour events:   - Inspiratory force/FVC q12h monitoring normal overnight  - RN neuro checks q12h  - PMR consult ordered per Neuro recs  - UA showed no UTI  - Ovarian tumor markers inhibin B 16 & wnl    Subjective: Patient is feeling well this AM. She feels her pain well controlled. Pain is crampy & mainly in low abdomen. Tolerating regular diet without nausea or vomiting. Passing flatus a lot and no BM. Voiding spontaneously without issues & frequently. Ambulating as able, trying to increase frequency. She notes she has concerns about weaning her pain medications as she \"doesn't want to take any steps bakc.\" She understands dilaudid cannot be given long term and will decrease to TID today then stop tomorrow.       Objective:   Patient Vitals for the past 24 hrs:   BP Temp Temp src Pulse Heart Rate Resp SpO2   09/29/17 1611 117/77 97.5  F (36.4  C) Oral - 85 16 94 %   09/29/17 0738 118/62 98.1  F (36.7  C) Oral 90 90 16 95 %     EXAM:   General: appears fatigued but interactive, in NAD  CV: regular rate  Resp: no increased work of breathing on room air  Abdomen: soft, appropriately tender, mildly distended  Incision: well healed vertical midline incision, clean/dry with no evidence of surrounding erythema, drainage or induration.   MSK/Neuro: able to move all extremities, speed of gross motor movements are improving  Extremities: nontender, trace nonpitting edema, SCDs in place    I/O last 3 completed shifts:  In: 1260 [P.O.:1260]  Out: 2260 [Urine:2250; Drains:10]      cc since MN, 129 cc/h    24h fluid balance:  -1000 mL    Drains: LLQ abdominal drain    Labs:   Hgb 11.1 (OSH) >12 > 11.1>9.0  Plts 188 > 188>182  Na 127 > 132 (OSH) > 135> 135>138  WBC 3.87 (OSH) " > 3.2. 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 She was transferred for surgical management of left adnexal mass w/ concern for possible CA. Now POD#3 s/p modified radical LISA, BS, LO, poss RO, staging on 9/27 with Dr. Cordero. She is still working on increasing ambulating, pain control and return of bowel function. She has needed a more than expected amount of pain medications for control of her pain in early postoperative course. Per RN notes, she has been requesting to be woken up to receive pain medications.    Dz: Left complex, cystic adnexal mass, measuring 6 x 9 x10 cm, with thick EMS 1.5 cm. Elevated  with concern for malignancy requiring definitive management with surgery. Frozen pathology c/w endometriomas. On laparotomy, adhesions and purulent fluid concerning for diverticular abscess, less likely TOA.   FEN: ADAT- regular. Hyponatremia prior to admit; now resolved.  Pain: s/p TAP blocks & dialudid PCA with poor pain control postop. She was on narcotics prior to admit and may have developed some tolerance.  - IV dilaudid 0.5mg q2h for pain control with plan to transition to TID dilaudid 9/30. Will discontinue IV dilaudid on 10/1.   - Oxycodone 10mg q3h.  Plan to space to q4h on 10/1.   - Currently Neurontin 900mg TID. Plan to increase to 900/900/1200mg on 9/30, then 1200/900/1200mg on 10/1 and 1200mg TID on 10/2.   - Ibuprofen & tylenol scheduled (patient refusing tylenol given h/o friend who overdoses on tylenol per patient report).   - S/p pain consult on 9/29 with recommendations as above  Heme: Hgb & Plts stable postop. EBL 300mL. No symptoms of acute blood loss anemia. Continue Lovenox ppx for DVT prevention.  CV: No issues; EKG 9/25 NSR.   Pulm: Monitor negative inspiratory force/FVC q12h per neuro recs. Normal overnight.  GI: PRN bowel regimen & antiemetics. Zantac BID. Tums PRN.  : Normal Cr on admit & repeat stable. No other issues. S/p Ni (9/28) & voiding w/o issues. Given persistent increased frequency & low abdominal pain, UA on 9/29 with no concern for UTI.   ID:   - Neutropenia- secondary to iatrogenic blood draws versus ongoing illness. Stable Plts & Hgb on last check. Continue to monitor closely. - Afebrile. 9/14 CSF Cx neg @ OSH. 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 CSF w/ elevated protein (OSH) & EMG here consistent with GBS diagnosis. MRI/CT (9/10) w/ cervical spine stenosis C6-7, L5-S1. Head CT neg (9/7). Lyme titers, IgA wnl @ OSH. Paraneoplasic panel sent previously to [place]; results not currently available.   - s/p 5d IVIG (9/18) w/ improvement in symptoms.  - Neuropathy/weakness in both BLE/BUE; improving, continue gabapentin titration up & close monitoring.  - Fascial muscle weakness R> L; facial droop on R; s/p PO course of prednisone 20mg/d; no recommendation for further PO steroids.   - q12h Neuro checks per RN  - Negative inspiratory force and FVCs q 12 hours, consider intubation if NIF >-20, or FVC <10-15 ml/kg (1023-3611)  - Hydrocortisone cream  TID PRN for mild rash on back that was present prior to admission; stable.  - OT/PT assessments recommend acute rehab; PMR consult recommends health psychology assessment given new limitations with acute onset illness, final recommendations pending 10/2 assessment.     PPX: Lovenox PPX. SCDs too painful per pt & declines these. Encourage ambulation, IS.     Lines/Drains: PICC, LLQ drain    Disposition: Discharge when meeting discharge goals after surgery including tolerating regular diet without nausea/emesis, pain well controlled on PO medications, voiding/ambulating without issue, passing flatus, vitals within normal limits. Anticipate discharge 10/2-10/3 given difficulty achieving pain control.     Viky Ott MD, MPH  OB/GYN PGY4  9/30/2017 6:10 AM     Gyn Onc Pager [phone]    Provider Disclosure:   I agree with above History, Review of Systems, Physical exam and Plan. I have reviewed the content of the documentation and have edited it as needed. I have personally performed the services documented here and the documentation accurately represents those services and the decisions I have made.     Electronically signed by:   Shahana Cordero MD   Gynecologic Oncology   [facility]

## 2017-09-30 NOTE — PLAN OF CARE
Problem: Patient Care Overview  Goal: Plan of Care/Patient Progress Review  Outcome: No Change  VSS. Pain is being managed with Ibuprofen, Dilaudid IV (.5mg) q2h and Oxycodone (10mg) q3h. Pt requesting to be woken for pain medications. RN reinforced pain policy frequently. Regular diet tolerating well. Voiding adequately. +PG/-BM. Up with 1 assist and walker. Pivot to commode. Midline incision with staples c/d/i. Cream applied to bottom per pt request d/t irritation. LOURDES to bulb suction with serosanguinous output. Continue POC.

## 2017-09-30 NOTE — PLAN OF CARE
"Problem: Patient Care Overview  Goal: Individualization & Mutuality  Outcome: No Change  Pain managed with oxycodone q3h, calling for IV dilaudid frequently. Also taking ibuprofen and robaxin, heat; declining tylenol. Pain is in abdomen \"sharp and constant\", also in bilat LE/UEs \"hard to describe\". States bilat LE and UE numbness and tingling. Neuros otherwise intact. Able to pivot from bed to commode with minimal assist. Good PO liquid intake, voiding good amts. Passing gas, no BM. Daughter at bedside.      "

## 2017-09-30 NOTE — PLAN OF CARE
Problem: Patient Care Overview  Goal: Plan of Care/Patient Progress Review  Pt seen by PT. She is struggling with pain control today despite much attention and intervention by nurse and medical team.  Pt initially declined due to abdominal and total body pain but later agreed to gait training in room after working with OT.  Discharge Planner PT   Patient plan for discharge: Pt reporting today that she wants to go home  Current status: Pt performed gait and transfers at close SBA using WW, she declined to follow log roll instructions for bed mobility but moves long to/from short sitting without assist  Barriers to return to prior living situation: pain, neuromuscular weakness, activity tolerance  Recommendations for discharge: TCU unless pt is able to progress to mod I with mobility including gait of 300' for community mobility and 4 stairs to enter home   Rationale for recommendations: Pt well below baseline, fall risk        Entered by: Lynne Kruse 09/30/2017 3:53 PM

## 2017-09-30 NOTE — PROGRESS NOTES
"I went to talk to the patient because she requested to talk to MD regarding pain control. Please see daily progress note and pain service note for the outlined plan. She had been on dilaudid .5mg Q2H PRN (which she was taking whenever she could) and we are transitioning to TID today. Per RN the pt has been sleeping every time she goes in the room but she has been intermittently using her call button to ask for narcotics. Continues to refuse tylenol despite being counseled that this is a safe medication and could help her maximize her pain control. Also refused to see health psychology, despite the fact that this was recommended by both PMNR and the pain service. We agree this would be a useful intervention given the patient has certainly been through a lot with her recent dx of GBS and recent surgery. She has a history of depression requiring hospitalization in the past and was started on Cymbalta yesterday. She is also refusing the menthol patch to help with incisional pain.      She reports pain \"everywhere\": in her arms, legs, and abdomen. When I offered her the Ketalar cream for her extremities she stated that her abdominal pain was actually what was bothering her and she states either she needs to get more of the IV medication (dilaudid) or increase the dose of her PO pain medications (though she is already getting 10mg oxycodone Q3H).     The patient's vitals remain stable and abdominal exam unchanged. As far as mental status/LOC she appears sedated with slow speech but is able to carry out an appropriate conversation.     I explained to her that we do not want her to be uncomfortable but we need to try to avoid adding narcotics as this will slow her recovery and put her at risk for dependence in the future. Given the fact she continues to refuse many of the non-narcotic interventions we are somewhat limited in what options we have left to offer.     Plan:   - continue with IV dilaudid 3 times today with plan " to wean completely tomorrow  - continue to encourage PO tylenol and other non-narcotic interventions that have been recommended  - will repeat labs  - will contact pain service to see if they have any other recommendations  - will re-evaluate pain in a few hours      Shahla Swann PGY-2   Gyn Onc

## 2017-10-01 ENCOUNTER — APPOINTMENT (OUTPATIENT)
Dept: PHYSICAL THERAPY | Facility: CLINIC | Age: 47
DRG: 742 | End: 2017-10-01
Attending: OBSTETRICS & GYNECOLOGY
Payer: COMMERCIAL

## 2017-10-01 LAB
BASOPHILS # BLD AUTO: 0 10E9/L (ref 0–0.2)
BASOPHILS # BLD AUTO: 0 10E9/L (ref 0–0.2)
BASOPHILS NFR BLD AUTO: 0.8 %
BASOPHILS NFR BLD AUTO: 1 %
DIFFERENTIAL METHOD BLD: ABNORMAL
DIFFERENTIAL METHOD BLD: ABNORMAL
EOSINOPHIL # BLD AUTO: 0.1 10E9/L (ref 0–0.7)
EOSINOPHIL # BLD AUTO: 0.1 10E9/L (ref 0–0.7)
EOSINOPHIL NFR BLD AUTO: 2.7 %
EOSINOPHIL NFR BLD AUTO: 5.2 %
ERYTHROCYTE [DISTWIDTH] IN BLOOD BY AUTOMATED COUNT: 18.8 % (ref 10–15)
ERYTHROCYTE [DISTWIDTH] IN BLOOD BY AUTOMATED COUNT: 18.8 % (ref 10–15)
HCT VFR BLD AUTO: 31.1 % (ref 35–47)
HCT VFR BLD AUTO: 33 % (ref 35–47)
HGB BLD-MCNC: 10.4 G/DL (ref 11.7–15.7)
HGB BLD-MCNC: 9.6 G/DL (ref 11.7–15.7)
IMM GRANULOCYTES # BLD: 0 10E9/L (ref 0–0.4)
IMM GRANULOCYTES # BLD: 0 10E9/L (ref 0–0.4)
IMM GRANULOCYTES NFR BLD: 0 %
IMM GRANULOCYTES NFR BLD: 0.5 %
LYMPHOCYTES # BLD AUTO: 0.8 10E9/L (ref 0.8–5.3)
LYMPHOCYTES # BLD AUTO: 1.1 10E9/L (ref 0.8–5.3)
LYMPHOCYTES NFR BLD AUTO: 42.9 %
LYMPHOCYTES NFR BLD AUTO: 43.4 %
MCH RBC QN AUTO: 26.7 PG (ref 26.5–33)
MCH RBC QN AUTO: 27.4 PG (ref 26.5–33)
MCHC RBC AUTO-ENTMCNC: 30.9 G/DL (ref 31.5–36.5)
MCHC RBC AUTO-ENTMCNC: 31.5 G/DL (ref 31.5–36.5)
MCV RBC AUTO: 87 FL (ref 78–100)
MCV RBC AUTO: 87 FL (ref 78–100)
MONOCYTES # BLD AUTO: 0.3 10E9/L (ref 0–1.3)
MONOCYTES # BLD AUTO: 0.4 10E9/L (ref 0–1.3)
MONOCYTES NFR BLD AUTO: 12.5 %
MONOCYTES NFR BLD AUTO: 21.5 %
NEUTROPHILS # BLD AUTO: 0.6 10E9/L (ref 1.6–8.3)
NEUTROPHILS # BLD AUTO: 1 10E9/L (ref 1.6–8.3)
NEUTROPHILS NFR BLD AUTO: 28.9 %
NEUTROPHILS NFR BLD AUTO: 40.6 %
NRBC # BLD AUTO: 0 10*3/UL
NRBC # BLD AUTO: 0 10*3/UL
NRBC BLD AUTO-RTO: 0 /100
NRBC BLD AUTO-RTO: 0 /100
PLATELET # BLD AUTO: 249 10E9/L (ref 150–450)
PLATELET # BLD AUTO: 294 10E9/L (ref 150–450)
PLATELET # BLD EST: ABNORMAL 10*3/UL
RBC # BLD AUTO: 3.59 10E12/L (ref 3.8–5.2)
RBC # BLD AUTO: 3.8 10E12/L (ref 3.8–5.2)
RETICS # AUTO: 99.6 10E9/L (ref 25–95)
RETICS/RBC NFR AUTO: 2.6 % (ref 0.5–2)
WBC # BLD AUTO: 1.9 10E9/L (ref 4–11)
WBC # BLD AUTO: 2.6 10E9/L (ref 4–11)

## 2017-10-01 PROCEDURE — 12000001 ZZH R&B MED SURG/OB UMMC

## 2017-10-01 PROCEDURE — 36592 COLLECT BLOOD FROM PICC: CPT | Performed by: STUDENT IN AN ORGANIZED HEALTH CARE EDUCATION/TRAINING PROGRAM

## 2017-10-01 PROCEDURE — 88185 FLOWCYTOMETRY/TC ADD-ON: CPT | Performed by: STUDENT IN AN ORGANIZED HEALTH CARE EDUCATION/TRAINING PROGRAM

## 2017-10-01 PROCEDURE — 88184 FLOWCYTOMETRY/ TC 1 MARKER: CPT | Performed by: STUDENT IN AN ORGANIZED HEALTH CARE EDUCATION/TRAINING PROGRAM

## 2017-10-01 PROCEDURE — 40000802 ZZH SITE CHECK

## 2017-10-01 PROCEDURE — 85025 COMPLETE CBC W/AUTO DIFF WBC: CPT | Performed by: STUDENT IN AN ORGANIZED HEALTH CARE EDUCATION/TRAINING PROGRAM

## 2017-10-01 PROCEDURE — 85045 AUTOMATED RETICULOCYTE COUNT: CPT | Performed by: STUDENT IN AN ORGANIZED HEALTH CARE EDUCATION/TRAINING PROGRAM

## 2017-10-01 PROCEDURE — 25000132 ZZH RX MED GY IP 250 OP 250 PS 637: Performed by: STUDENT IN AN ORGANIZED HEALTH CARE EDUCATION/TRAINING PROGRAM

## 2017-10-01 PROCEDURE — 40000193 ZZH STATISTIC PT WARD VISIT

## 2017-10-01 PROCEDURE — 40001005 ZZHCL STATISTIC FLOW >15 ABY TC 88189: Performed by: STUDENT IN AN ORGANIZED HEALTH CARE EDUCATION/TRAINING PROGRAM

## 2017-10-01 PROCEDURE — 40000611 ZZHCL STATISTIC MORPHOLOGY W/INTERP HEMEPATH TC 85060: Performed by: STUDENT IN AN ORGANIZED HEALTH CARE EDUCATION/TRAINING PROGRAM

## 2017-10-01 PROCEDURE — 25000132 ZZH RX MED GY IP 250 OP 250 PS 637: Performed by: OBSTETRICS & GYNECOLOGY

## 2017-10-01 PROCEDURE — 94150 VITAL CAPACITY TEST: CPT

## 2017-10-01 PROCEDURE — 97116 GAIT TRAINING THERAPY: CPT | Mod: GP

## 2017-10-01 PROCEDURE — 25000125 ZZHC RX 250: Performed by: OBSTETRICS & GYNECOLOGY

## 2017-10-01 PROCEDURE — 40000275 ZZH STATISTIC RCP TIME EA 10 MIN

## 2017-10-01 PROCEDURE — 25000128 H RX IP 250 OP 636: Performed by: STUDENT IN AN ORGANIZED HEALTH CARE EDUCATION/TRAINING PROGRAM

## 2017-10-01 PROCEDURE — 25000132 ZZH RX MED GY IP 250 OP 250 PS 637: Performed by: NURSE PRACTITIONER

## 2017-10-01 RX ORDER — GABAPENTIN 300 MG/1
900 CAPSULE ORAL DAILY
Status: DISCONTINUED | OUTPATIENT
Start: 2017-10-01 | End: 2017-10-01

## 2017-10-01 RX ORDER — OXYCODONE HYDROCHLORIDE 5 MG/1
5-10 TABLET ORAL EVERY 4 HOURS PRN
Status: DISCONTINUED | OUTPATIENT
Start: 2017-10-01 | End: 2017-10-01

## 2017-10-01 RX ORDER — GABAPENTIN 400 MG/1
1200 CAPSULE ORAL 2 TIMES DAILY
Status: DISCONTINUED | OUTPATIENT
Start: 2017-10-01 | End: 2017-10-01

## 2017-10-01 RX ORDER — OXYCODONE HYDROCHLORIDE 5 MG/1
10-15 TABLET ORAL EVERY 4 HOURS PRN
Status: DISCONTINUED | OUTPATIENT
Start: 2017-10-01 | End: 2017-10-04 | Stop reason: HOSPADM

## 2017-10-01 RX ORDER — GABAPENTIN 400 MG/1
1200 CAPSULE ORAL 3 TIMES DAILY
Status: DISCONTINUED | OUTPATIENT
Start: 2017-10-01 | End: 2017-10-04 | Stop reason: HOSPADM

## 2017-10-01 RX ADMIN — IBUPROFEN 600 MG: 600 TABLET ORAL at 14:16

## 2017-10-01 RX ADMIN — Medication 120 MG: at 22:16

## 2017-10-01 RX ADMIN — CARBOXYMETHYLCELLULOSE SODIUM 1 DROP: 5 SOLUTION/ DROPS OPHTHALMIC at 10:42

## 2017-10-01 RX ADMIN — GABAPENTIN 1200 MG: 400 CAPSULE ORAL at 09:10

## 2017-10-01 RX ADMIN — Medication: at 09:10

## 2017-10-01 RX ADMIN — IBUPROFEN 600 MG: 600 TABLET ORAL at 01:46

## 2017-10-01 RX ADMIN — IBUPROFEN 600 MG: 600 TABLET ORAL at 09:10

## 2017-10-01 RX ADMIN — GABAPENTIN 1200 MG: 400 CAPSULE ORAL at 20:31

## 2017-10-01 RX ADMIN — Medication: at 16:57

## 2017-10-01 RX ADMIN — METHOCARBAMOL 500 MG: 500 TABLET ORAL at 01:46

## 2017-10-01 RX ADMIN — OXYCODONE HYDROCHLORIDE 10 MG: 5 TABLET ORAL at 18:21

## 2017-10-01 RX ADMIN — CIPROFLOXACIN HYDROCHLORIDE 500 MG: 500 TABLET, FILM COATED ORAL at 09:10

## 2017-10-01 RX ADMIN — IBUPROFEN 600 MG: 600 TABLET ORAL at 20:32

## 2017-10-01 RX ADMIN — METRONIDAZOLE 500 MG: 500 TABLET ORAL at 10:33

## 2017-10-01 RX ADMIN — GABAPENTIN 900 MG: 300 CAPSULE ORAL at 14:16

## 2017-10-01 RX ADMIN — Medication 120 MG: at 09:11

## 2017-10-01 RX ADMIN — Medication 120 MG: at 16:52

## 2017-10-01 RX ADMIN — CARBOXYMETHYLCELLULOSE SODIUM 1 DROP: 10 GEL OPHTHALMIC at 14:16

## 2017-10-01 RX ADMIN — METHOCARBAMOL 500 MG: 500 TABLET ORAL at 16:33

## 2017-10-01 RX ADMIN — METHOCARBAMOL 500 MG: 500 TABLET ORAL at 22:43

## 2017-10-01 RX ADMIN — METRONIDAZOLE 500 MG: 500 TABLET ORAL at 01:51

## 2017-10-01 RX ADMIN — OXYCODONE HYDROCHLORIDE 10 MG: 5 TABLET ORAL at 01:46

## 2017-10-01 RX ADMIN — OXYCODONE HYDROCHLORIDE 5 MG: 5 TABLET ORAL at 01:47

## 2017-10-01 RX ADMIN — METHOCARBAMOL 500 MG: 500 TABLET ORAL at 10:40

## 2017-10-01 RX ADMIN — OXYCODONE HYDROCHLORIDE 15 MG: 5 TABLET ORAL at 22:16

## 2017-10-01 RX ADMIN — RANITIDINE 150 MG: 150 TABLET ORAL at 20:32

## 2017-10-01 RX ADMIN — OXYCODONE HYDROCHLORIDE 15 MG: 5 TABLET ORAL at 06:50

## 2017-10-01 RX ADMIN — METRONIDAZOLE 500 MG: 500 TABLET ORAL at 16:33

## 2017-10-01 RX ADMIN — DULOXETINE HYDROCHLORIDE 30 MG: 30 CAPSULE, DELAYED RELEASE ORAL at 14:38

## 2017-10-01 RX ADMIN — RANITIDINE 150 MG: 150 TABLET ORAL at 09:10

## 2017-10-01 RX ADMIN — OXYCODONE HYDROCHLORIDE 15 MG: 5 TABLET ORAL at 14:16

## 2017-10-01 RX ADMIN — OXYCODONE HYDROCHLORIDE 15 MG: 5 TABLET ORAL at 10:33

## 2017-10-01 RX ADMIN — ONDANSETRON 4 MG: 4 TABLET, ORALLY DISINTEGRATING ORAL at 20:39

## 2017-10-01 RX ADMIN — ENOXAPARIN SODIUM 40 MG: 40 INJECTION SUBCUTANEOUS at 16:34

## 2017-10-01 RX ADMIN — SENNOSIDES AND DOCUSATE SODIUM 2 TABLET: 8.6; 5 TABLET ORAL at 20:32

## 2017-10-01 RX ADMIN — CIPROFLOXACIN HYDROCHLORIDE 500 MG: 500 TABLET, FILM COATED ORAL at 20:32

## 2017-10-01 ASSESSMENT — PAIN DESCRIPTION - DESCRIPTORS
DESCRIPTORS: PATIENT UNABLE TO DESCRIBE
DESCRIPTORS: ACHING;SORE
DESCRIPTORS: PATIENT UNABLE TO DESCRIBE
DESCRIPTORS: ACHING;SORE
DESCRIPTORS: PATIENT UNABLE TO DESCRIBE
DESCRIPTORS: PATIENT UNABLE TO DESCRIBE
DESCRIPTORS: ACHING;SORE
DESCRIPTORS: ACHING

## 2017-10-01 NOTE — PLAN OF CARE
Problem: Surgery Nonspecified (Adult)  Goal: Signs and Symptoms of Listed Potential Problems Will be Absent, Minimized or Managed (Surgery Nonspecified)  Signs and symptoms of listed potential problems will be absent, minimized or managed by discharge/transition of care (reference Surgery Nonspecified (Adult) CPG).   Outcome: Improving  Pain managed with PO Oxycodone Q3 hrs, Ibuprofen, Robaxin, Simethicone, Ketamine/Gabapentin gel (after much encouragement), scheduled Gabapentin and 2 doses of IV Dilaudid since 6 AM. Patient declining Tylenol despite encouragement. Patient was upset that the IV Dilaudid was reconstituted with NS and pushed in over 5 minutes, stating it didn't give her pain relief even though the dose didn't change from previous administrations. MD notified of patient's complaints and has seen the patient (see note). Despite verbal descriptors of pain, patient does not demonstrate these behaviorally. Pt is able to get in and out of bed with ease, has been up to the commode and sink with minimal SBA. Encouragement to maximize additional non-narcotic interventions (PO Tylenol, warm packs, abdominal binder) and emotional support provided. Mid-line incision with scant drainage, abd pad changed. LOURDES with scant ss drainage, dressing changed. Pt passing flatus and has had 3 large BMs today. Voiding with good output. Continue with POC.

## 2017-10-01 NOTE — PLAN OF CARE
Problem: Patient Care Overview  Goal: Plan of Care/Patient Progress Review  Discharge Planner PT   Patient plan for discharge: Previously wanting to go home although seems to be on board for TCU today.  Current status: Ambulates 125ft with FWW and CGAx1 with w/c follow.  Significant slow gait, short step length and kyphosis due to fatigue.  Bed mobility and transfers with SBAx1-IND.  Ascends and descends 4 stairs with B HH on single railing with CGA-min Ax1.    Barriers to return to prior living situation: Proximal weakness, fatigue with endurance activities, decreased gait quality from baseline, A with stairs.    Recommendations for discharge: TCU   Rationale for recommendations: In order to increase functional IND mobility, stability and activity tolerance.         Entered by: Abdulkadir Springer 10/01/2017 4:32 PM

## 2017-10-01 NOTE — PLAN OF CARE
Problem: Patient Care Overview  Goal: Plan of Care/Patient Progress Review  OT-7C: Cancel. Pt working with other care provider. Unable to check back.

## 2017-10-01 NOTE — PLAN OF CARE
Problem: Patient Care Overview  Goal: Plan of Care/Patient Progress Review  Outcome: Improving  VSS. Pain is being managed with scheduled Ibuprofen, Cymbalata, Gabapentin, Robaxin, Ketalar cream (tid)-- to feet and hands and PRN Oxycodone. No icy hot patch on now. LOURDES drain removed at 1400. PICC with good blood return. Saline locked. Regular diet. Only ate late lunch-100%. Voiding adeqautly. Up ind to sink and commode. Up with one assist and walker to ambulate. Numbness and tingling present in hands and feet. Midline incision with dermabond covered per pt preference. Plan to possible dc Tuesday.

## 2017-10-01 NOTE — PROGRESS NOTES
Gynecology Oncology Progress Note    Date: 10/1/2017     HD#7, POD#4  Procedure: Modified Radical LISA, BSO, bilateral ureterolysis, cystoscopy, proctoscopy    Disease: Left complex cystic mass of left adnexa, benign endometriosis on frozen    24 hour events:   - continued difficulty with pain control throughout the day. Spaced out oxycodone but increased dose from 10 to 15mg     Subjective: Patient is feeling better this AM. She feels her pain well controlled. Tolerating regular diet without nausea or vomiting. 3x BM. Voiding spontaneously without issues. Ambulating to commode and back and did work with PT yesterday.  Sad because daughter had to go back to [place] and was going to take Cymbalta last nighr for this reason but states she does not want to take Cymbalta long term and has been refusing it    Objective:   Patient Vitals for the past 24 hrs:   BP Temp Temp src Pulse Heart Rate Resp SpO2   09/30/17 2229 111/72 98.1  F (36.7  C) Oral - 77 16 96 %   09/30/17 1549 116/65 96.9  F (36.1  C) Oral 90 - 16 99 %   09/30/17 0729 105/51 97.8  F (36.6  C) Oral - 78 16 95 %     EXAM:   General: appears fatigued but interactive, in NAD  CV: regular rate  Resp: no increased work of breathing on room air  Abdomen: soft, appropriately tender, mildly distended  Incision: well healed vertical midline incision, clean/dry with no evidence of surrounding erythema, drainage or induration. Drain in place  MSK/Neuro: able to move all extremities, speed of gross motor movements are improving  Extremities: nontender, trace nonpitting edema, SCDs not in place    I/O last 3 completed shifts:  In: 1620 [P.O.:1600; I.V.:20]  Out: 2195 [Urine:1375; Drains:20; Other:800]     UOP unmeasured 1x, making good urine output    24h fluid balance: -575cc mL    Drains: LLQ abdominal drain    Labs:   Hgb 11.1 (OSH) >12 > 11.1>9.0 >9.6  Plts 188 > 188>182 >217  Na 127 > 132 (OSH) > 135> 135>138 > 9.6  WBC 3.87 (OSH) > 3.2. 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 She was transferred for surgical management of left adnexal mass w/ concern for possible CA. Now POD#4 s/p modified radical LISA, BS, LO, poss RO, staging on 9/27 with Dr. Cordero. She is still working on increasing ambulating, pain control and return of bowel function. She has needed a more than expected amount of pain medications for control of her pain in early postoperative course. Per RN notes, she has been requesting to be woken up to receive pain medications.    Dz: Left complex, cystic adnexal mass, measuring 6 x 9 x10 cm, with thick EMS 1.5 cm. Elevated  with concern for malignancy requiring definitive management with surgery. Frozen pathology c/w endometriomas. On laparotomy, adhesions and purulent fluid concerning for diverticular abscess, less likely TOA.   FEN: ADAT- regular. Hyponatremia prior to admit; now resolved.  Pain: s/p TAP blocks & dialudid PCA with poor pain control postop. She was on narcotics prior to admit and may have developed some tolerance.  - discontinued dilaudid yesterday   - Oxycodone 10mg q3h -> 15mg Q4h overnight. Plan to decrease dose this morning.   - Increased gabapentin to 900/900/1200mg on 9/30, will increase ue4837/900/1200mg on  10/1 and 1200mg TID on 10/2.   - Ibuprofen & tylenol scheduled (patient refusing tylenol given h/o friend who overdoses on tylenol per patient report).   - S/p pain consult on 9/29 with recommendations as above  Heme: Hgb & Plts stable postop. EBL 300mL. No symptoms of acute blood loss anemia. Continue Lovenox ppx for DVT prevention.   CV: No issues; EKG 9/25 NSR.   Pulm: Monitor negative inspiratory force/FVC q12h per neuro recs. Normal overnight.  GI: PRN bowel regimen & antiemetics. Zantac BID. Tums PRN.  : Normal Cr on admit & repeat stable. No other issues. S/p Ni (9/28) & voiding w/o issues. Given persistent increased frequency & low abdominal pain, UA on 9/29 with no concern for UTI.   ID:   - Neutropenia- secondary to iatrogenic blood draws versus ongoing illness. Stable Plts & Hgb on last check. Continue to monitor closely. - Afebrile. 9/14 CSF Cx neg @ OSH. 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 CSF w/ elevated protein (OSH) & EMG here consistent with GBS diagnosis. MRI/CT (9/10) w/ cervical spine stenosis C6-7, L5-S1. Head CT neg (9/7). Lyme titers, IgA wnl @ OSH. Paraneoplasic panel sent previously to [place]; results not currently available.   - s/p 5d IVIG (9/18) w/ improvement in symptoms.  - Neuropathy/weakness in both BLE/BUE; improving, continue gabapentin titration up & close monitoring.  - Fascial muscle weakness R> L; facial droop on R; s/p PO course of prednisone 20mg/d prior to transfer; no recommendation for further PO steroids.   - q12h Neuro checks per RN  - Negative inspiratory force and FVCs q 12 hours, consider intubation if NIF >-20, or FVC <10-15 ml/kg ([id number]) - have been normal   - Hydrocortisone cream TID PRN for mild rash on back that was present prior to admission;  stable.  - OT/PT assessments recommend acute rehab; PMR consult recommends health psychology assessment given new limitations with acute onset illness, final recommendations pending 10/2 assessment.     PPX: Lovenox PPX. SCDs too painful per pt & declines these. Encourage ambulation, IS.     Lines/Drains: PICC, LLQ drain (remove today)    Disposition: Discharge when meeting discharge goals after surgery including tolerating regular diet without nausea/emesis, pain well controlled on PO medications, voiding/ambulating without issue, passing flatus, vitals within normal limits. Anticipate discharge 10/2-10/3 given difficulty achieving pain control.     Shahla Swann, PGY-2  Gyn onc service  [phone]    GYN ONC Fellow  Pt seen at bedside with team.  Pain control improved overnight with increase to 15 mg oxycodone q4h but now no longer on IV dilaudid.  Continues to decline tylenol and cymbalta, increasing gabapentin per pain service recommendations today. Otherwise will continue ibuprofen and robaxin. , neutropenic but afebrile.  No recent steroids (last received prednisone prior to transfer to [facility]).  Will review case with hematology.  +flatus, tolerating regular diet.  Continue routine postop care.  PM&R to see tomorrow for final recommendations regarding rehab requirements.

## 2017-10-01 NOTE — PROCEDURES
Pre-procedure diagnoses: Post-surgical LOURDES drain in place  Post-procedure diagnoses: Same, s/p removal of LUORDES drain    Procedure details: Suture scissors were used to cut the suture at the base of the skin, freeing the LOURDES drain. The patient was instructed to take a deep breath. While exhaling, the LOURDES drain was pulled. The patient tolerated the procedure well.     Lizzette Garcia MD   Resident Physician, PGY2  Obstetrics, Gynecology, and Women's Health

## 2017-10-01 NOTE — PROGRESS NOTES
Went to check on patient. She was on commode at the time. I offered to come back but she said it was fine. Continues to request a higher dose of narcotics. I asked her about the Tylenol and she says it brings back memories of almost losing her  and she does not want to. Had been told by the pain service originally that it was okay that she refuse it. States she used the cream and it helped her hands a little but not her feet. She was able to get up off the commode, pull her underwear up (I stood by her to make sure she didn't fall), walk to the sink, wash her hands, walk to the bed, and get in unassisted. She got a little teary once she was in bed saying she was tired of pain. I sympathized with her and said I was sorry that she was in pain and that I wanted her to get better. I offered to get her another abdominal binder like the one that had been replaced when her drain leaked. Plan is to increase her dose of gabapentin tonight.     Abdominal exam showed non-distended. It was tender but no guarding.  Incision cdi and drain in place.      2100: I went back and patient was crying, her daughter just went back to [place]. She had been visiting. I spent 40 minutes talking to the patient. She misses her daughter a lot and just wants to get back to living in [place].     Plan:   - as plan is to change to 10q4H tmrw and the patient is already expressing extreme discomfort at Q3H, will give 15mg Q4H overnight to help transition to new timing. Will attempt to wean in the morning.   - continue to encourage tylenol  - continue to encourage ambulation and PT  - discuss seeing health psycholo  - will get the original type of abdominal binder the patient had after the OR to provide support for her when she gets up. She doesn't like the one she was given as a replacement     Shahla Swann, PGY-2  DW Dr. Centeno gyn onc fellow

## 2017-10-01 NOTE — PLAN OF CARE
Problem: Patient Care Overview  Goal: Individualization & Mutuality  Outcome: Therapy, progress toward functional goals as expected  Pain now managed with oxycodone q4h, ibuprofen, robaxin, Ketalar cream (to feet and hands), heat. Declining tylenol and icyhot patch. Describes pain mostly in abdomen, some in hands/feet which seems to be improving. Denies nausea, tolerating regular diet, good PO fluid intake. Voiding, soft BMs. Up to commode and sink independently.

## 2017-10-02 ENCOUNTER — APPOINTMENT (OUTPATIENT)
Dept: PHYSICAL THERAPY | Facility: CLINIC | Age: 47
DRG: 742 | End: 2017-10-02
Attending: OBSTETRICS & GYNECOLOGY
Payer: COMMERCIAL

## 2017-10-02 LAB
BASOPHILS # BLD AUTO: 0 10E9/L (ref 0–0.2)
BASOPHILS NFR BLD AUTO: 1.2 %
C DIFF TOX B STL QL: NEGATIVE
COPATH REPORT: NORMAL
DIFFERENTIAL METHOD BLD: ABNORMAL
EOSINOPHIL # BLD AUTO: 0.2 10E9/L (ref 0–0.7)
EOSINOPHIL NFR BLD AUTO: 6 %
ERYTHROCYTE [DISTWIDTH] IN BLOOD BY AUTOMATED COUNT: 18.6 % (ref 10–15)
FOLATE SERPL-MCNC: 13.8 NG/ML
HCT VFR BLD AUTO: 31.3 % (ref 35–47)
HGB BLD-MCNC: 9.5 G/DL (ref 11.7–15.7)
IMM GRANULOCYTES # BLD: 0 10E9/L (ref 0–0.4)
IMM GRANULOCYTES NFR BLD: 0 %
LYMPHOCYTES # BLD AUTO: 1.2 10E9/L (ref 0.8–5.3)
LYMPHOCYTES NFR BLD AUTO: 48.6 %
MCH RBC QN AUTO: 26.8 PG (ref 26.5–33)
MCHC RBC AUTO-ENTMCNC: 30.4 G/DL (ref 31.5–36.5)
MCV RBC AUTO: 88 FL (ref 78–100)
MONOCYTES # BLD AUTO: 0.4 10E9/L (ref 0–1.3)
MONOCYTES NFR BLD AUTO: 17.5 %
NEUTROPHILS # BLD AUTO: 0.7 10E9/L (ref 1.6–8.3)
NEUTROPHILS NFR BLD AUTO: 26.7 %
NRBC # BLD AUTO: 0 10*3/UL
NRBC BLD AUTO-RTO: 0 /100
PLATELET # BLD AUTO: 273 10E9/L (ref 150–450)
RBC # BLD AUTO: 3.54 10E12/L (ref 3.8–5.2)
SPECIMEN SOURCE: NORMAL
VIT B12 SERPL-MCNC: 478 PG/ML (ref 193–986)
WBC # BLD AUTO: 2.5 10E9/L (ref 4–11)

## 2017-10-02 PROCEDURE — 40000193 ZZH STATISTIC PT WARD VISIT

## 2017-10-02 PROCEDURE — 82607 VITAMIN B-12: CPT | Performed by: INTERNAL MEDICINE

## 2017-10-02 PROCEDURE — 87493 C DIFF AMPLIFIED PROBE: CPT | Performed by: OBSTETRICS & GYNECOLOGY

## 2017-10-02 PROCEDURE — 99232 SBSQ HOSP IP/OBS MODERATE 35: CPT | Mod: GC | Performed by: INTERNAL MEDICINE

## 2017-10-02 PROCEDURE — 25000132 ZZH RX MED GY IP 250 OP 250 PS 637: Performed by: OBSTETRICS & GYNECOLOGY

## 2017-10-02 PROCEDURE — 99232 SBSQ HOSP IP/OBS MODERATE 35: CPT | Mod: GC | Performed by: OBSTETRICS & GYNECOLOGY

## 2017-10-02 PROCEDURE — 40000802 ZZH SITE CHECK

## 2017-10-02 PROCEDURE — 86706 HEP B SURFACE ANTIBODY: CPT | Performed by: INTERNAL MEDICINE

## 2017-10-02 PROCEDURE — 36592 COLLECT BLOOD FROM PICC: CPT | Performed by: STUDENT IN AN ORGANIZED HEALTH CARE EDUCATION/TRAINING PROGRAM

## 2017-10-02 PROCEDURE — 86803 HEPATITIS C AB TEST: CPT | Performed by: INTERNAL MEDICINE

## 2017-10-02 PROCEDURE — 87340 HEPATITIS B SURFACE AG IA: CPT | Performed by: INTERNAL MEDICINE

## 2017-10-02 PROCEDURE — 25000132 ZZH RX MED GY IP 250 OP 250 PS 637: Performed by: STUDENT IN AN ORGANIZED HEALTH CARE EDUCATION/TRAINING PROGRAM

## 2017-10-02 PROCEDURE — 25000128 H RX IP 250 OP 636: Performed by: STUDENT IN AN ORGANIZED HEALTH CARE EDUCATION/TRAINING PROGRAM

## 2017-10-02 PROCEDURE — 97110 THERAPEUTIC EXERCISES: CPT | Mod: GP

## 2017-10-02 PROCEDURE — 25000132 ZZH RX MED GY IP 250 OP 250 PS 637: Performed by: NURSE PRACTITIONER

## 2017-10-02 PROCEDURE — 86704 HEP B CORE ANTIBODY TOTAL: CPT | Performed by: INTERNAL MEDICINE

## 2017-10-02 PROCEDURE — 12000008 ZZH R&B INTERMEDIATE UMMC

## 2017-10-02 PROCEDURE — 36592 COLLECT BLOOD FROM PICC: CPT | Performed by: INTERNAL MEDICINE

## 2017-10-02 PROCEDURE — 87389 HIV-1 AG W/HIV-1&-2 AB AG IA: CPT | Performed by: INTERNAL MEDICINE

## 2017-10-02 PROCEDURE — 97530 THERAPEUTIC ACTIVITIES: CPT | Mod: GP

## 2017-10-02 PROCEDURE — 25000125 ZZHC RX 250: Performed by: OBSTETRICS & GYNECOLOGY

## 2017-10-02 PROCEDURE — 82746 ASSAY OF FOLIC ACID SERUM: CPT | Performed by: INTERNAL MEDICINE

## 2017-10-02 PROCEDURE — 99207 ZZC NO CHARGE SIGN-OFF PS: CPT | Performed by: NURSE PRACTITIONER

## 2017-10-02 PROCEDURE — 85025 COMPLETE CBC W/AUTO DIFF WBC: CPT | Performed by: STUDENT IN AN ORGANIZED HEALTH CARE EDUCATION/TRAINING PROGRAM

## 2017-10-02 RX ADMIN — CIPROFLOXACIN HYDROCHLORIDE 500 MG: 500 TABLET, FILM COATED ORAL at 08:19

## 2017-10-02 RX ADMIN — OXYCODONE HYDROCHLORIDE 15 MG: 5 TABLET ORAL at 02:39

## 2017-10-02 RX ADMIN — DULOXETINE HYDROCHLORIDE 30 MG: 30 CAPSULE, DELAYED RELEASE ORAL at 08:19

## 2017-10-02 RX ADMIN — METHOCARBAMOL 500 MG: 500 TABLET ORAL at 17:04

## 2017-10-02 RX ADMIN — OXYCODONE HYDROCHLORIDE 15 MG: 5 TABLET ORAL at 08:14

## 2017-10-02 RX ADMIN — Medication 120 MG: at 05:16

## 2017-10-02 RX ADMIN — ENOXAPARIN SODIUM 40 MG: 40 INJECTION SUBCUTANEOUS at 16:05

## 2017-10-02 RX ADMIN — OXYCODONE HYDROCHLORIDE 15 MG: 5 TABLET ORAL at 21:09

## 2017-10-02 RX ADMIN — MAGNESIUM HYDROXIDE 30 ML: 400 SUSPENSION ORAL at 08:29

## 2017-10-02 RX ADMIN — IBUPROFEN 600 MG: 600 TABLET ORAL at 14:06

## 2017-10-02 RX ADMIN — CARBOXYMETHYLCELLULOSE SODIUM 1 DROP: 10 GEL OPHTHALMIC at 20:11

## 2017-10-02 RX ADMIN — IBUPROFEN 600 MG: 600 TABLET ORAL at 20:12

## 2017-10-02 RX ADMIN — METRONIDAZOLE 500 MG: 500 TABLET ORAL at 10:42

## 2017-10-02 RX ADMIN — OXYCODONE HYDROCHLORIDE 15 MG: 5 TABLET ORAL at 17:01

## 2017-10-02 RX ADMIN — METRONIDAZOLE 500 MG: 500 TABLET ORAL at 16:06

## 2017-10-02 RX ADMIN — METHOCARBAMOL 500 MG: 500 TABLET ORAL at 11:22

## 2017-10-02 RX ADMIN — METRONIDAZOLE 500 MG: 500 TABLET ORAL at 02:39

## 2017-10-02 RX ADMIN — GABAPENTIN 1200 MG: 400 CAPSULE ORAL at 08:18

## 2017-10-02 RX ADMIN — METHOCARBAMOL 500 MG: 500 TABLET ORAL at 23:12

## 2017-10-02 RX ADMIN — IBUPROFEN 600 MG: 600 TABLET ORAL at 02:39

## 2017-10-02 RX ADMIN — RANITIDINE 150 MG: 150 TABLET ORAL at 20:13

## 2017-10-02 RX ADMIN — ONDANSETRON 4 MG: 4 TABLET, ORALLY DISINTEGRATING ORAL at 14:03

## 2017-10-02 RX ADMIN — IBUPROFEN 600 MG: 600 TABLET ORAL at 08:18

## 2017-10-02 RX ADMIN — GABAPENTIN 1200 MG: 400 CAPSULE ORAL at 20:12

## 2017-10-02 RX ADMIN — OXYCODONE HYDROCHLORIDE 15 MG: 5 TABLET ORAL at 12:48

## 2017-10-02 RX ADMIN — CIPROFLOXACIN HYDROCHLORIDE 500 MG: 500 TABLET, FILM COATED ORAL at 20:13

## 2017-10-02 RX ADMIN — METHOCARBAMOL 500 MG: 500 TABLET ORAL at 05:16

## 2017-10-02 RX ADMIN — RANITIDINE 150 MG: 150 TABLET ORAL at 08:18

## 2017-10-02 RX ADMIN — SENNOSIDES AND DOCUSATE SODIUM 2 TABLET: 8.6; 5 TABLET ORAL at 08:19

## 2017-10-02 RX ADMIN — GABAPENTIN 1200 MG: 400 CAPSULE ORAL at 14:06

## 2017-10-02 ASSESSMENT — PAIN DESCRIPTION - DESCRIPTORS: DESCRIPTORS: ACHING

## 2017-10-02 NOTE — PLAN OF CARE
Problem: Patient Care Overview  Goal: Individualization & Mutuality  Outcome: Therapy, progress toward functional goals is gradual    Pain managed with oxycodone q4-5h (requesting full dose), ibuprofen, robaxin.   Still has some numbness and tingling/discomfort in L arm and bilat LEs. Able to get from bed to commode with no problem.  Voiding, 1 small soft BM.   Tolerating regular diet, no nausea.   Incision had a little drainage, cleansed, ABD placed over. MD aware, assessed.  In good spirits, talked about wanting to go home.

## 2017-10-02 NOTE — PROGRESS NOTES
Gynecology Oncology Progress Note    Date: 10/2/2017     HD#8, POD#5  Procedure: Modified Radical LISA, BSO, bilateral ureterolysis, cystoscopy, proctoscopy    Disease: Left complex cystic mass of left adnexa, benign endometriosis on frozen    24 hour events:   - Ongoing pain management, increased gabapentin to 1200, 900, 1200 and off of all IV dilaudid  - Required oxycodone 15mg q4h for adequate 5-7/10 pain control  - LOURDES drain removed  - Small serosanguinous discharge from wound    Subjective: Doing okay - states pain was well controlled overnight with the 15mg q4h. Thinks she will be able to work with PMR/PT/OT today. States she will be going to [facility] to inpatient rehab but heard they don't have a bed until tomorrow. No N/V. Passing flatus, no BM yesterday but had two the day before. Tolerating regular diet. Ambulating with baseline difficulty and pain but no dizziness. In retrospect, does think that her extremity numbness and pain has improved slightly this admission.    Objective:   Patient Vitals for the past 24 hrs:   BP Temp Temp src Pulse Heart Rate Resp SpO2   10/02/17 0012 113/65 97.7  F (36.5  C) Oral 81 81 16 95 %   10/01/17 1457 117/55 97.8  F (36.6  C) Oral - 82 16 96 %   10/01/17 0752 103/55 98.2  F (36.8  C) Oral - 90 16 95 %     EXAM:   General: appears fatigued but interactive, in NAD  CV: regular rate  Resp: no increased work of breathing on room air  Abdomen: soft, appropriately tender, mildly distended  Incision: well healed vertical midline incision, clean/dry with no evidence of surrounding erythema, drainage or induration.   MSK/Neuro: able to move all extremities, speed of gross motor movements are improving  Extremities: nontender, trace nonpitting edema, SCDs not in place    I/O last 3 completed shifts:  In: 940 [P.O.:900; I.V.:40]  Out: 600 [Urine:600]     Intake: 24h // overnight (mL)  PO: 1120 // 200  IV: 20 // 0  Output: 24h // overnight (mL)  UOP: 1050 + 1 unmeasured void // 1  unmeasured void    Admission fluid balance -871 mL      Lines/Drans: PICC    Labs:   Hgb 11.1 (OSH)>12>11.1>>9.6>10.4>9.5  Plts 188 >>294>273  WBC 3.87 (OSH)>3.2>>3.9>1.6>2.6  ANC 0.6>1.0>0.7  Na 127>132 (OSH)>135>>>139 (9/30)  Cr 0.67 (9/30)  K 4.4 (9/30)    New labs today:  CBC with diff, as above    Pending cultures: None to report; CSF culture negative at OSH    Imaging: None to report    Active Problem list:  Postoperative state  Suboptimal pain control  Left complex cystic adnexal mass, elevated  - c/w endometriosis on frozen  Gullian-Armstrong syndrome  Fascial weakness with right facial droop  Neuropathy in BLE/BUE  Generalized deconditioning/weakness  Hyponatremia; resolve  Concern for diverticular abscess     Assessment/Plan: Lynne Llanos is a  48 yo female HD#8 after [facility] from [facility] ([place], [place]) where she was admitted for Guillan Armstrong Syndrome (felt secondary to 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 She was transferred for surgical management of left adnexal mass w/ concern for possible CA. Now POD#5 s/p modified radical LISA, BSO, cystoscopy, proctoscopy, bilateral ureterolysis on 9/27 with Dr. Cordero. Postoperative course complicated by poor pain control secondary to subacute neuropathic pain and postoperative pain.     Dz:   - Benign; Frozen pathology c/w endometriomas. On laparotomy, adhesions and purulent fluid concerning for diverticular abscess, less likely TOA.   FEN:   - Tolerating regular diet. MIVF off. Hyponatremia on admission, resolved.   Pain:   - s/p TAP blocks & dialudid PCA with poor pain control postoperatively  - Continue oxycodone 15mg q4h, consider titration down to 10 mg q4h today  - Gabapentin 1200 mg TID today, titrated up from 900 mg TID since POD#3 per pain service  - Ibuprofen & tylenol scheduled, pt continues to refuse tylenol  given h/o ex partner with tylenol overdose  - Appreciate pain service involvement  Heme:   - Stable acute blood loss anemia with no signs of ongoing bleeding on exam  - Neutropenia/Leukopenia: Likely secondary to acute illness per hematology. Appropriately elevated reticulocyte response with otherwise normal CBC. Pending peripheral smear and flow cytometry per hematology. Appreciate hematology recommendations.  CV:   - No current issues  Pulm:   - Continue to monitor negative inspiratory force/FVC q12h per neuro recs; within normal parameters.  - Encourage incentive spirometer.  GI:   - Continue senna colace and dulcolax  - PRN anti emetics, zantac and simethicone available  - Appreciate adequate return of bowel function  :   - No current issues, voiding spontaneous s/p mendez with normal UA for urinary frequency on POD#3  ID:   - Neutropenic precautions, neutropenic as above  - Concern for diverticular abscess intraop: D#6/14 flagyl/cipro  Endocrine:   - No issues  Psych/Neuro/MSK:   - Guillain-[place] Syndrome: Initial concern for paraneoplastic syndrome however benign pelvic mass on frozen pathology. See prior notes for comprehensive workup. Neurology has signed off and recommends follow up with neurology in 4-8 weeks post discharge.  - Continue q12h neuro checks and BID negative inspiratory force and FVCs  - Continue OT/PT and PMR, appreciate recommendations; patient declines health psychology assessment as recommended by PMR; anticipate discharge to inpatient rehab  - Appreciate mild improvement in LE and UE weakness and pain symptoms  - Continue Cymbalta, robaxin, neurontin and Ketalar cream for neuropathic pain    PPX:   - Lovenox PPX; pt declining SCD's; encourage ambulation and IS    Disposition: Discharge when meeting discharge goals after surgery including tolerating regular diet without nausea/emesis, pain well controlled on PO medications, voiding/ambulating without issue, passing flatus, vitals within  normal limits. Anticipate discharge 10/2-10/3 to inpatient rehabilitation, will discuss with social work today. Await hematology consult prior to DC.    Shahana Ospina MD  OB GYN PGY-3  10/2/2017  7:09 AM    I saw and evaluated the patient. I agree with the findings and the plan of care as documented in Dr. Ospina 's note.   Still working on pain control as above. Exam all appropriate for postop  Cnt antibiotics for diverticular abscess. Drain out.   Having some hot flushes but not worse than preop. Would consider HRT after acute illness has improved if still symptomatic (can be managed by her general obgyn).     Laurie Jones MD  Gynecologic Oncology  Pager [phone]

## 2017-10-02 NOTE — PLAN OF CARE
Problem: Patient Care Overview  Goal: Plan of Care/Patient Progress Review  Outcome: Improving  Abdominal incisional with scant drainage,passing gas and stool.Voiding spont, not saving.Alert and oriented x4, arms with strong , neuro checks intact except c/o numbness and tingling knees to feet, but improving per patient. Ambulating with sba, needs much encouragement to walk.Tolerating regular diet. Pain controlled with Neurontin, Ibuprofen and Oxycodone.

## 2017-10-02 NOTE — PLAN OF CARE
Problem: Patient Care Overview  Goal: Plan of Care/Patient Progress Review  Outcome: Improving  VSS. Pain is being managed with scheduled Ibuprofen, Cymbalata, Gabapentin, Robaxin, Ketalar cream (tid)-- to feet and hands and PRN Oxycodone. No icy hot patch on now. Midline incision with dermabond covered with dressing c/d/i.  Abdominal binder in use. On regular diet, poor appetite. Zofran given x1 for nausea. No BM, passing gas. Voiding adequate amounts of urine. PICC saline locked. Numbness and tingling present in bilateral hands and feet. Up with a stand by assist. Continue plan of care.

## 2017-10-02 NOTE — PROGRESS NOTES
Physical Medicine & Rehabilitation Progress Note  Attending's note  This is a 47-year-old female who presents with GB syndrome in the setting of  Endometriomas status post radical TAHBS LO this admission   While at the time of admission on 30 of September, she presented with subacute progressive ascending weakness, she has made significant improvement in her recovery   During my encounter she had significant lack of motivation to participate in intense rehabilitation. Additionally she is ambulating 150 feet with contact guard assist using a walker.  She's limited by chronic low back pain   1 the initial recommendations were for acute rehabilitation unit, she has certainly progressed beyond the need for intense rehabilitation. At this time she would benefit from a transitional care unit placement where she can undergo rehabilitation at her own pace.   She is agreeable to the plan of care   Coordinate the care with her team       Thank you for consulting the PM&R Department.   For any questions, please feel free to page me at [phone]   Total of 65 minutes spent in this encounter of which more than 50% was in counseling and coordination    Yodit Cummings MD   Department of PM&R      Assessment/Plan:  Ms. 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 Biswas 9/26) and inability to ambulate where she was diagnosed with Guillain Lulu Syndrome with concern for paraneoplastic syndrome given left adnexal complex, cystic mass and elevated . She is s/p IVIg x 5d (9/18) with improvement and radical LISA, BS, LO, poss RO and staging on 9/27 with Dr. Cordero with benign pathology c/w endometriomas. Overall, the patient's GBS recovery trajectory has been quite impressive based on history  "and exam today. She presents with decreased strength and endurance resulting in new functional impairments with mobility and ADLs secondary to above.     At this time, recommend discharge to TCU for ongoing PT/OT and pain management once medical work up complete.     Interim Medical Changes:  Benign frozen path c/w endometriomas  Pain improved. Off IV dilaudid (last 9/30), up-titrating neurontin (1200, 900, 1200), robaxin q6h, 15mg oxycodone q4h (total 75mg oxycodone on 10/1), scheduled ibuprofen and tylenol, topicals  BMx2 on 9/30  Cymbalta started  Remains on enoxaparin  LOURDES drain removed  Neutropenia/leukopenia thought secondary to acute illness per hematology - flow cytometry and peripheral smear pending  Continue with NIF/FVC q12 h without respiratory concern    Therapy Updates:  PT 10/1: \"Ambulates 125ft with FWW and CGAx1 with w/c follow.  Significant slow gait, short step length and kyphosis due to fatigue.  Bed mobility and transfers with SBAx1-IND.  Ascends and descends 4 stairs with B HH on single railing with CGA-min Ax1\"    OT 9/29: \"Pt currently requires assistance for all functional mobility and ADLs, SBA for bed mobility, CGA for sit<>stand transfers, very close CGA/Unique for ambulation. MaxA for LE dressing. CGA for shower transfers, Unique for bathing, modA to dry off body, significantly fatigued with all standing mobility.\"    Physical Exam  B/P: 113/65, T: 97.7, P: 81, R: 16  Gen: NAD, lying in bed  HEENT: NCAT  MSK: Moving all extremities freely.   NEURO: alert, interactive and appropriate. No facial weakness. Moving all extremities with >4/5 strength. Able to complete hip flexion today with 4/5 strength which she was not able to do on 9/29 2/2 pain. Paresthesias of right L5 distribution.     Labs:  Hgb stable around 9.5  WBC around 2.5  Flow cytometry and peripheral smear pending      Nabila Patel MD  PGY-4  PM&R  "

## 2017-10-02 NOTE — PROGRESS NOTES
Social Work Services Progress Note    Hospital Day: 8  Date of Initial Social Work Evaluation:  9/26/17  Collaborated with:  Pt, [facility] (Jasmina)    Data:  Pt is 46 y/o female admitted to [facility] on 9/25/17. Pt is being recommended TCU placement at [place].     Intervention:  TESS confirmed with Jasmina @ [facility] that Pt has been accepted clinically and financially to [facility] once a way for Pt to get to [facility] is found. SW updated Jasmina that PM&R consult happened today and ARU is no longer recommended, but TCU placement is. Per Jasmina, there is no TCU and ARU would still accept her at this time for admission. SW offered to fax updated notes and Jasmina said that she did not need them at this time.    SW met with Pt to discuss transportation. SW had confirmed with [facility] transportation that they are able to transport to [facility], however, there is no guarantee that Pt's insurance would cover this. SW also discussed the possibility of setting up [facility] transportation, which is a private cost and would be approximately $1,800. Pt reports that she cannot afford either of these options. SW asked if Pt had any friends and/or family who could provide transport and Pt said that no one had access to a car.     SW provided Pt with contact information for [facility] as well as [facility] so that she could call them herself to f/u and see if [facility] and/or [facility] would pay for transportation to return to [place]. Pt called and spoke with TESS Nassar at [facility]. Per pt, Cesario said to have an MD at [facility] call Dr. Melquiades Alonso at [facility] and discuss if he would accept her back as an inpatient at [facility]. If Dr. Alonso is willing to do this, then per Cesraio, [facility] would pay for transportation to get her back. TESS updated Dr. Crowley who said he would call Dr. Alonso today to discuss this further.     SW offered to make referrals to local TCU's and Pt refused at this time.    Assessment:  Pt will  DC to TCU vs. ARU when transportation found.    Plan:    Anticipated Disposition:  Facility:  TBD    Barriers to d/c plan:  Transportation, safe DC plan    Follow Up:  SW will continue to follow and assist with DC plan.    BIRD Clayton, [facility]   [phone]  Pager: [phone]

## 2017-10-02 NOTE — CONSULTS
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  Hematology consulted for further workup of leukopenia.     #Leukopenia with otherwise unremarkable CBC (Hgb drop appropriate in setting of recent surgery)  #Neutropenia ANC 0.7  Patient noted to have WBC of 3.8 along with ANC 0.6 at OSH. Since admission at [facility], her CBC is notable for persistent leukopenia and neutropenia. She has not had frequent or recent PCP visits prior to September when she presented for evaluation of numbness and tingling. Therefore, it is unclear at this time if she has had ongoing leukopenia or if this is an acute process. Recent systemic illness could be a potential etiology of leukopenia. Differential diagnosis at this time is broad including nutritional deficiency (low B12, folate). Malignant process including leukemia, lymphoma, myelodysplastic syndromes can also present with leukopenia. However, patient does not currently have any B symptoms (fevers, chills, night sweats, weight loss) to suggest a malignant process and denies history of recurrent infections. Pending results of peripheral smear at this time to assess the necessity of obtaining a bone marrow biopsy. Infectious etiology including EBV, CMV, HIV, hepatitis B and C should also be taken into consideration.  Drug-induced neutropenia is also a possibility. However, current medication list does not contain any likely offending meds.     RECOMMENDATIONS:  - Pending B12 and folate levels - already ordered   - Pending viral infectious work-up (HIV, Hepatitis C, B) - already ordered   - Pending official read of peripheral smear (will review  today)  - Please convey to primary team at OSH where patient will be discharged tomorrow the importance of following CBC to note if patient has ongoing leukopenia and consider obtaining bone marrow biopsy       Celio Holden MD  PGY1, Internal Medicine  Pager: [phone]    This patient was staffed with the attending, Dr. Mart, who agrees with the above assessment and plan.     ---------------------------------------------------------------------------------------------------------------------------------  HEMATOLOGY STAFF:    Patient seen with consult team.  Resident's note reflects our joint evaluation, assessment, and plan.  Asked to evaluate regarding leukopenia / neutropenia.  Other CBC parameters have been normal / appropriate for the clinical context.  She is unaware of any previous concerns about abnormal blood counts, but has not had regular medical care in at least the last several years.  Denies any history of infection propensity.  CBC parameters have been stable overall during this admission, and are not significantly changed from labs at outside hospital prior to transfer here.    Peripheral blood smear shows no concerning morphologic abnormalities, and nothing to suggest a primary bone marrow disease.  Flow cytometry of the peripheral blood also shows no abnormalities.    At this time, the chronicity and cause of her leukopenia / neutropenia remains unclear.  However, as CBC parameters are stable, peripheral smear and flow cytometry show no concerning findings, a bone marrow biopsy is not urgently needed at this time.  See no reason to delay transfer back to [place] from our perspective.  Would recommend that her local physicians continue to follow CBC, and if there is no improvement along with improvement of other active medical issues, or if there is worsening of her leukopenia or development of other CBC abnormalities, bone marrow biopsy should then be considered.      Dexter Mart MD  Associate  Professor of Medicine  Division of Hematology, Oncology, and Transplantation  Director, [facility]    ---------------------------------------------------------------------------------------------------------------------------------    HISTORY OF PRESENT ILLNESS:  Ms. 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  Hematology consulted for further workup of leukopenia.     On 2017, Ms. Llanos woke up with numbness of hands and feet along with shooting pain from fingers to elbows and numbness predominating left leg moreso than the right. Over the course of the next couple days, she developed right facial droop along with worsening lower extremity weakness for which she visited [facility] where she underwent CT brain, cervical and lumbar spine and subsequently referred to  for further evaluation. MRI lumbar at [place] revealed an enhancing pelvic mass and tumor markers notable for elevated  of 119. She was subsequently transferred to [facility] for staging of pelvic mass.  here was elevated at 61, CA 19-9 elevated at 41. However, inhibin B, AFP, CEA, HCG tumor levels were unremarkable. Preliminary surgical pathology of pelvic mass and cytology was negative for malignancy, making concerns for neoplastic GBM unlikely and paraneoplastic panel and ganglioside Ab panel is unlikely to  per neurology.     Ms. Llanos was also noted to have WBC of 3.8 at OSH and 3.2 on admission to [facility]. Her ANC at OSH was 0.6 and has remained low at this time. Peripheral smear and flow cytometry have been obtained. Patient denies any fevers, chills, night sweats, unintentional weight loss, changes in appetite, history of recurrent infections at this  time. Moreover, she states that she has not been told by her provider that she has a low WBC and has not visited a PCP frequently until most recently when she began having neurological symptoms.     PAST MEDICAL HISTORY:    Past Medical History:   Diagnosis Date     Guillain Barré syndrome Probable perineoplastic      Obesity      Pelvic mass        Past Surgical History:   Procedure Laterality Date     CYSTOSCOPY N/A 9/27/2017    Procedure: CYSTOSCOPY;;  Surgeon: Shahana Cordero MD;  Location: [facility] OR     FOOT SURGERY Right      HYSTERECTOMY TOTAL ABDOMINAL, BILATERAL SALPINGO-OOPHORECTOMY, COMBINED Bilateral 9/27/2017    Procedure: COMBINED HYSTERECTOMY TOTAL ABDOMINAL, SALPINGO-OOPHORECTOMY;;  Surgeon: Shahana Cordero MD;  Location: [facility] OR     LAPAROTOMY EXPLORATORY N/A 9/27/2017    Procedure: LAPAROTOMY EXPLORATORY;  laparotomy exploratory,modified radical hysterectomy Bilateral Salpingo-Oophrectomy, cystoscopy, proctoscopy,pelvic washings, bilateral ureterolysis. ;  Surgeon: Shahana Cordero MD;  Location: [facility] OR     PROCTOSCOPY N/A 9/27/2017    Procedure: PROCTOSCOPY;;  Surgeon: Shahana Cordero MD;  Location: [facility] OR     Right knee surgery x 4       TUBAL LIGATION          MEDICATIONS:  PTA Meds  Prior to Admission medications    Medication Sig Last Dose Taking? Auth Provider   HYDROmorphone, STANDARD CONC, (DILAUDID) 1 MG/ML LIQD liquid Inject 1 mg into the vein every 2 hours as needed for moderate to severe pain 9/25/2017 at 1453 Yes Reported, Patient   GABAPENTIN PO Take 900 mg by mouth 2 times daily 9/24/2017 at 2100 Yes Reported, Patient   RaNITidine HCl (ZANTAC PO) Take 150 mg by mouth 2 times daily 9/24/2017 at 2100 Yes Reported, Patient   magnesium hydroxide (MILK OF MAGNESIA) 400 MG/5ML suspension Take 30 mLs by mouth 2 times daily 9/24/2017 at 2100 Yes Reported, Patient   senna-docusate (SENOKOT-S;PERICOLACE) 8.6-50 MG per tablet Take 2 tablets by mouth 2 times daily 9/24/2017 at 2100 Yes Reported, Patient  "  enoxaparin (LOVENOX) 40 MG/0.4ML injection Inject 40 mg Subcutaneous daily 9/24/2017 at 2100 Yes Reported, Patient   hydrocortisone 2.5 % cream Apply topically 2 times daily 9/24/2017 at 2100 Yes Reported, Patient      Current Meds    enoxaparin  40 mg Subcutaneous Q24H     carboxymethylcellulose  1 drop Both Eyes BID     gabapentin  1,200 mg Oral TID     ketamine (KETALAR) 5%-gabapentin (NEURONTIN) 8%-lidocaine 2.5%   Topical TID     DULoxetine  30 mg Oral Daily     menthol   Transdermal Q8H     simethicone  120 mg Oral Q6H     sodium chloride (PF)  3 mL Intracatheter Q8H     senna-docusate  1-2 tablet Oral BID     bisacodyl  10 mg Rectal Daily     ibuprofen  600 mg Oral Q6H     ranitidine  150 mg Oral BID     metroNIDAZOLE  500 mg Oral Q8H LEIDY     ciprofloxacin  500 mg Oral Q12H LEIDY     Infusion Meds       ALLERGIES:    Allergies   Allergen Reactions     Morphine Itching       REVIEW OF SYSTEMS:  A 10 point review of systems was negative except as noted above.    SOCIAL HISTORY:   Social History     Social History     Marital status: Single     Spouse name: N/A     Number of children: N/A     Years of education: N/A     Occupational History     Not on file.     Social History Main Topics     Smoking status: Not on file     Smokeless tobacco: Not on file     Alcohol use Not on file     Drug use: Not on file     Sexual activity: Not on file     Other Topics Concern     Not on file     Social History Narrative       FAMILY MEDICAL HISTORY:   History reviewed. No pertinent family history.    PHYSICAL EXAM:   /65 (BP Location: Left arm)  Pulse 81  Temp 97.7  F (36.5  C) (Oral)  Resp 16  Ht 1.753 m (5' 9\")  Wt 100.1 kg (220 lb 11.2 oz)  SpO2 95%  BMI 32.59 kg/m2         General: alert and in mild distress from pain  Head: NC/AT  Eyes: non-icteric sclera, EOMI  Lymph:  No palpable adenopathy  Pulmonary: CTAB  CV: RRR, did not appreciate any murmurs, rubs or gallops  GI: soft, tender around site of incision " which is clean, dry, intact around bandages, non-distended + bowel sounds.  No HSM.  MSK: no paraspinal or CVA tenderness   EXT: no edema, WWP  Neuro: awake and alert, CN II-VI, VIII-XII intact. Facial droop on right side. Sensation to light touch in BLE and BUE intact. Strength 5/5 in BLE and BUE.     LABS:   CMP  Recent Labs  Lab 09/30/17  1415 09/28/17  0826 09/26/17  0322 09/25/17  1756    138 135 135   POTASSIUM 4.4 3.7 4.1 4.3   CHLORIDE 106 105 101 102   CO2 27 25 28 25   ANIONGAP 6 7 6 8   GLC 89 112* 96 83   BUN 8 9 12 12   CR 0.67 0.75 0.64 0.66   GFRESTIMATED >90 83 >90 >90   GFRESTBLACK >90 >90 >90 >90   BONNIE 8.8 8.3* 9.3 9.4   MAG 2.3  --   --  2.3   PHOS 3.8  --   --   --    PROTTOTAL  --  6.0*  --  8.8   ALBUMIN  --  2.1*  --  3.0*   BILITOTAL  --  0.3  --  0.3   ALKPHOS  --  41  --  60   AST  --  21  --  45   ALT  --  21  --  48     CBC  Recent Labs  Lab 10/02/17  0343 10/01/17  1435 10/01/17  0846 09/30/17  1415   HGB 9.5* 10.4* 9.6* 9.6*   WBC 2.5* 2.6* 1.9* 1.6*   RBC 3.54* 3.80 3.59* 3.55*   HCT 31.3* 33.0* 31.1* 30.8*   MCV 88 87 87 87   MCH 26.8 27.4 26.7 27.0   MCHC 30.4* 31.5 30.9* 31.2*   RDW 18.6* 18.8* 18.8* 18.6*    294 249 217       URINE STUDIES  Recent Labs   Lab Test  09/29/17   0810   COLOR  Yellow   APPEARANCE  Slightly Cloudy   URINEGLC  Negative   URINEBILI  Negative   URINEKETONE  Negative   SG  1.009   UBLD  Moderate*   URINEPH  7.5*   PROTEIN  Negative   NITRITE  Negative   LEUKEST  Trace*   RBCU  4*   WBCU  2     - Lumbar puncture at OSH: CSF protein elevated at 174, CSF RBC 72, 81% lymphocytes Cultures negative.   - : 119 at OSH.     Labs obtained on 9/26:  - : 61   - CA 19-9: 41 (H)   - HCG tumor: <3   - AFP: 2.1   - Inhibin B: 16  - LDH: 206     IMAGING:  CT Cervical Spine 09/10/17: No acute fracture. Straightening of the cervical lordosis with mild reversal centered at C4-5, mild anterolisthesis, Central disc protrusion with moderate spinal canal  stenosis mild left neural forminal stenosis C6-7, Mild to moderate spinal canal stenosis mild right and mild left to moderate left neural foraminal stenosis C5-6, Mild spinal canal stenosis mild right and mild to moderate left neural foraminal stenosis C4-5, Moderate left neural foraminal stenosis C3-4.  CT head 9/10/17: No acute intracranial hemorrhage, mass or loss of gray-white differentiation  CT Lumbar spine 9/10/17: No acute fracture, disc degeneration, L5-S1 moderate to severe bilateral neural foraminal stenosis and possible impingement of the exiting L5 nerve root     Performed at [facility]:  - MRI Brain - No acute intracranial abnormality  - MRI Cervical Spine: Spinal canal stenosis at the level of C6-7 is caused by left paracentral disc herniation, cervical degenerative disc disease is most pronounced at levels of C4-5, C5-6, and C6-7. There is loss of normal cervical lordosis.  - MRI Thoracic No evidence of spinal cord compression or neural exit narrowing  - MRI Lumbar: Enhancing pelvic mass, multilevel degenerative disc disease, hypertrophic facet degenerative joint disease L5-S1 neural exit narrowing.  - CT of chest, abdomen and pelvis: Complex cystic mass of left adnexa, bilateral lower lobe pneumonia/subsegmental atelectasis, spinal stenosis at C6-7, and degenerative disc diseaes at multiple levels.  - Transvaginal U/S found 5x5x4 cm fibroid, 9x8x12 left adnexal mass and normal right ovary. Endometrial stripe 1.5 cm

## 2017-10-02 NOTE — PLAN OF CARE
Problem: Patient Care Overview  Goal: Plan of Care/Patient Progress Review  PT 7C  Discharge Planner PT   Patient plan for discharge: home  Current status: pt is SBA to complete functional transfers; declines ambulation this day due to nausea; tolerates seated/standing exercises with repetitions 5-10.  Barriers to return to prior living situation: decreased activity tolerance, fatigue with ambulation and out of bed activity, proximal muscle weakness  Recommendations for discharge: TCU  Rationale for recommendations: Pt below baseline functional mobility and activity tolerance, to progress safety with independent mobility.       Entered by: Fredo Garcia 10/02/2017 2:50 PM

## 2017-10-03 ENCOUNTER — APPOINTMENT (OUTPATIENT)
Dept: PHYSICAL THERAPY | Facility: CLINIC | Age: 47
DRG: 742 | End: 2017-10-03
Attending: OBSTETRICS & GYNECOLOGY
Payer: COMMERCIAL

## 2017-10-03 VITALS
SYSTOLIC BLOOD PRESSURE: 104 MMHG | TEMPERATURE: 97.7 F | OXYGEN SATURATION: 97 % | WEIGHT: 220.7 LBS | DIASTOLIC BLOOD PRESSURE: 67 MMHG | BODY MASS INDEX: 32.69 KG/M2 | HEART RATE: 74 BPM | HEIGHT: 69 IN | RESPIRATION RATE: 18 BRPM

## 2017-10-03 LAB
BASOPHILS # BLD AUTO: 0 10E9/L (ref 0–0.2)
BASOPHILS NFR BLD AUTO: 0.6 %
DIFFERENTIAL METHOD BLD: ABNORMAL
EOSINOPHIL # BLD AUTO: 0.2 10E9/L (ref 0–0.7)
EOSINOPHIL NFR BLD AUTO: 6.3 %
ERYTHROCYTE [DISTWIDTH] IN BLOOD BY AUTOMATED COUNT: 18.4 % (ref 10–15)
HBV CORE AB SERPL QL IA: REACTIVE
HBV SURFACE AB SERPL IA-ACNC: 345.81 M[IU]/ML
HBV SURFACE AG SERPL QL IA: NONREACTIVE
HCT VFR BLD AUTO: 31.4 % (ref 35–47)
HCV AB SERPL QL IA: NONREACTIVE
HGB BLD-MCNC: 9.8 G/DL (ref 11.7–15.7)
HIV 1+2 AB+HIV1 P24 AG SERPL QL IA: NONREACTIVE
IMM GRANULOCYTES # BLD: 0 10E9/L (ref 0–0.4)
IMM GRANULOCYTES NFR BLD: 0.3 %
LYMPHOCYTES # BLD AUTO: 1.3 10E9/L (ref 0.8–5.3)
LYMPHOCYTES NFR BLD AUTO: 37.5 %
MCH RBC QN AUTO: 27.5 PG (ref 26.5–33)
MCHC RBC AUTO-ENTMCNC: 31.2 G/DL (ref 31.5–36.5)
MCV RBC AUTO: 88 FL (ref 78–100)
MONOCYTES # BLD AUTO: 0.6 10E9/L (ref 0–1.3)
MONOCYTES NFR BLD AUTO: 16.4 %
MRSA DNA SPEC QL NAA+PROBE: NEGATIVE
NEUTROPHILS # BLD AUTO: 1.4 10E9/L (ref 1.6–8.3)
NEUTROPHILS NFR BLD AUTO: 38.9 %
NRBC # BLD AUTO: 0 10*3/UL
NRBC BLD AUTO-RTO: 0 /100
PLATELET # BLD AUTO: 301 10E9/L (ref 150–450)
RBC # BLD AUTO: 3.57 10E12/L (ref 3.8–5.2)
SPECIMEN SOURCE: NORMAL
WBC # BLD AUTO: 3.5 10E9/L (ref 4–11)

## 2017-10-03 PROCEDURE — 40000802 ZZH SITE CHECK

## 2017-10-03 PROCEDURE — 25000128 H RX IP 250 OP 636: Performed by: STUDENT IN AN ORGANIZED HEALTH CARE EDUCATION/TRAINING PROGRAM

## 2017-10-03 PROCEDURE — 25000132 ZZH RX MED GY IP 250 OP 250 PS 637: Performed by: OBSTETRICS & GYNECOLOGY

## 2017-10-03 PROCEDURE — 40000193 ZZH STATISTIC PT WARD VISIT

## 2017-10-03 PROCEDURE — 12000008 ZZH R&B INTERMEDIATE UMMC

## 2017-10-03 PROCEDURE — 87640 STAPH A DNA AMP PROBE: CPT | Performed by: STUDENT IN AN ORGANIZED HEALTH CARE EDUCATION/TRAINING PROGRAM

## 2017-10-03 PROCEDURE — 36592 COLLECT BLOOD FROM PICC: CPT | Performed by: OBSTETRICS & GYNECOLOGY

## 2017-10-03 PROCEDURE — 25000132 ZZH RX MED GY IP 250 OP 250 PS 637: Performed by: NURSE PRACTITIONER

## 2017-10-03 PROCEDURE — 25000132 ZZH RX MED GY IP 250 OP 250 PS 637: Performed by: STUDENT IN AN ORGANIZED HEALTH CARE EDUCATION/TRAINING PROGRAM

## 2017-10-03 PROCEDURE — 85025 COMPLETE CBC W/AUTO DIFF WBC: CPT | Performed by: OBSTETRICS & GYNECOLOGY

## 2017-10-03 PROCEDURE — 97530 THERAPEUTIC ACTIVITIES: CPT | Mod: GP

## 2017-10-03 PROCEDURE — 97116 GAIT TRAINING THERAPY: CPT | Mod: GP

## 2017-10-03 PROCEDURE — 94150 VITAL CAPACITY TEST: CPT

## 2017-10-03 PROCEDURE — 87641 MR-STAPH DNA AMP PROBE: CPT | Performed by: STUDENT IN AN ORGANIZED HEALTH CARE EDUCATION/TRAINING PROGRAM

## 2017-10-03 PROCEDURE — 40000275 ZZH STATISTIC RCP TIME EA 10 MIN

## 2017-10-03 PROCEDURE — 99232 SBSQ HOSP IP/OBS MODERATE 35: CPT | Mod: GC | Performed by: OBSTETRICS & GYNECOLOGY

## 2017-10-03 RX ORDER — OXYCODONE HYDROCHLORIDE 10 MG/1
10-15 TABLET ORAL EVERY 4 HOURS PRN
Qty: 90 TABLET | Refills: 0 | Status: SHIPPED | OUTPATIENT
Start: 2017-10-03

## 2017-10-03 RX ORDER — METHOCARBAMOL 500 MG/1
500 TABLET, FILM COATED ORAL EVERY 6 HOURS PRN
Qty: 60 TABLET | Refills: 0 | Status: SHIPPED | OUTPATIENT
Start: 2017-10-03

## 2017-10-03 RX ORDER — METRONIDAZOLE 500 MG/1
500 TABLET ORAL EVERY 8 HOURS
Qty: 21 TABLET | Refills: 0 | Status: SHIPPED | OUTPATIENT
Start: 2017-10-03 | End: 2017-10-10

## 2017-10-03 RX ORDER — DULOXETIN HYDROCHLORIDE 30 MG/1
30 CAPSULE, DELAYED RELEASE ORAL DAILY
Qty: 30 CAPSULE | Refills: 1 | Status: SHIPPED | OUTPATIENT
Start: 2017-10-03

## 2017-10-03 RX ORDER — GABAPENTIN 400 MG/1
1200 CAPSULE ORAL 3 TIMES DAILY
Qty: 90 CAPSULE | Refills: 0 | Status: SHIPPED | OUTPATIENT
Start: 2017-10-03

## 2017-10-03 RX ORDER — SIMETHICONE 80 MG
120 TABLET,CHEWABLE ORAL EVERY 6 HOURS
Qty: 60 TABLET | Refills: 0 | Status: SHIPPED | OUTPATIENT
Start: 2017-10-03

## 2017-10-03 RX ORDER — IBUPROFEN 600 MG/1
600 TABLET, FILM COATED ORAL EVERY 6 HOURS
Qty: 30 TABLET | Refills: 0 | Status: SHIPPED | OUTPATIENT
Start: 2017-10-03

## 2017-10-03 RX ORDER — CIPROFLOXACIN 500 MG/1
500 TABLET, FILM COATED ORAL EVERY 12 HOURS
Qty: 14 TABLET | Refills: 0 | Status: SHIPPED | OUTPATIENT
Start: 2017-10-03 | End: 2017-10-10

## 2017-10-03 RX ADMIN — CIPROFLOXACIN HYDROCHLORIDE 500 MG: 500 TABLET, FILM COATED ORAL at 20:16

## 2017-10-03 RX ADMIN — RANITIDINE 150 MG: 150 TABLET ORAL at 20:16

## 2017-10-03 RX ADMIN — ENOXAPARIN SODIUM 40 MG: 40 INJECTION SUBCUTANEOUS at 16:32

## 2017-10-03 RX ADMIN — RANITIDINE 150 MG: 150 TABLET ORAL at 09:05

## 2017-10-03 RX ADMIN — METHOCARBAMOL 500 MG: 500 TABLET ORAL at 18:46

## 2017-10-03 RX ADMIN — METRONIDAZOLE 500 MG: 500 TABLET ORAL at 01:09

## 2017-10-03 RX ADMIN — OXYCODONE HYDROCHLORIDE 15 MG: 5 TABLET ORAL at 23:09

## 2017-10-03 RX ADMIN — IBUPROFEN 600 MG: 600 TABLET ORAL at 14:40

## 2017-10-03 RX ADMIN — OXYCODONE HYDROCHLORIDE 15 MG: 5 TABLET ORAL at 14:39

## 2017-10-03 RX ADMIN — DULOXETINE HYDROCHLORIDE 30 MG: 30 CAPSULE, DELAYED RELEASE ORAL at 09:06

## 2017-10-03 RX ADMIN — IBUPROFEN 600 MG: 600 TABLET ORAL at 01:08

## 2017-10-03 RX ADMIN — OXYCODONE HYDROCHLORIDE 15 MG: 5 TABLET ORAL at 10:17

## 2017-10-03 RX ADMIN — GABAPENTIN 1200 MG: 400 CAPSULE ORAL at 09:04

## 2017-10-03 RX ADMIN — GABAPENTIN 1200 MG: 400 CAPSULE ORAL at 20:15

## 2017-10-03 RX ADMIN — OXYCODONE HYDROCHLORIDE 15 MG: 5 TABLET ORAL at 01:09

## 2017-10-03 RX ADMIN — CIPROFLOXACIN HYDROCHLORIDE 500 MG: 500 TABLET, FILM COATED ORAL at 09:05

## 2017-10-03 RX ADMIN — IBUPROFEN 600 MG: 600 TABLET ORAL at 09:04

## 2017-10-03 RX ADMIN — OXYCODONE HYDROCHLORIDE 15 MG: 5 TABLET ORAL at 18:46

## 2017-10-03 RX ADMIN — CARBOXYMETHYLCELLULOSE SODIUM 1 DROP: 5 SOLUTION/ DROPS OPHTHALMIC at 20:15

## 2017-10-03 RX ADMIN — METRONIDAZOLE 500 MG: 500 TABLET ORAL at 10:17

## 2017-10-03 RX ADMIN — CARBOXYMETHYLCELLULOSE SODIUM 1 DROP: 5 SOLUTION/ DROPS OPHTHALMIC at 20:16

## 2017-10-03 RX ADMIN — Medication 120 MG: at 16:32

## 2017-10-03 RX ADMIN — GABAPENTIN 1200 MG: 400 CAPSULE ORAL at 14:39

## 2017-10-03 RX ADMIN — MAGNESIUM HYDROXIDE 30 ML: 400 SUSPENSION ORAL at 20:16

## 2017-10-03 RX ADMIN — METHOCARBAMOL 500 MG: 500 TABLET ORAL at 12:17

## 2017-10-03 RX ADMIN — METRONIDAZOLE 500 MG: 500 TABLET ORAL at 16:32

## 2017-10-03 RX ADMIN — METHOCARBAMOL 500 MG: 500 TABLET ORAL at 05:55

## 2017-10-03 RX ADMIN — IBUPROFEN 600 MG: 600 TABLET ORAL at 20:16

## 2017-10-03 RX ADMIN — OXYCODONE HYDROCHLORIDE 15 MG: 5 TABLET ORAL at 05:55

## 2017-10-03 ASSESSMENT — PAIN DESCRIPTION - DESCRIPTORS
DESCRIPTORS: ACHING

## 2017-10-03 NOTE — PLAN OF CARE
"Problem: Patient Care Overview  Goal: Individualization & Mutuality  Outcome: Therapy, progress towards functional goals is fair     Pain managed with oxycodone q4h, robaxin, ibuprofen. States pain is \"getting less\".  Tolerating regular diet, denies nausea.  Passing gas, no BM overnight, Cdiff negative, enteric isolation d/c.  Voiding.  Bilat LE numbness/tingling still present and unchanged. Able to go from bed to commode and back independently     PLAN: Hopefully discharge today to a rehab facility.      "

## 2017-10-03 NOTE — PROGRESS NOTES
Social Work Services Discharge Note      Patient Name:  Lynne Llanos     Anticipated Discharge Date:  Tomorrow, Wednesday 10/4/17    Discharge Disposition:   ARU:  [facility], call report to [phone]    Following MD:  Per facilities designation     Additional Services/Equipment Arranged: Discharge transportation scheduled through [employer] (Ph: [phone]) for pickup at 6AM - W/C transportation with [employer] providing a W/C.     SW called [facility] [employer] ([phone]) to inquire if they will pay for Pt's transportation. They informed SW that they will not pay for transportation for comfort/convenience and that this will likely not be covered. SW met with Pt to discuss the cost; she is aware that this will likely be billed to her privately and she has agreed to this. Pt said that she will likely set up a payment plan with [employer] once she is back home.    SW did speak with Romi Vu, RN at [facility] [employer], and she said she would look into whether [facility] might consider covering the cost of the transportation, but that she was not sure this could be done. Pt is aware of this.     Patient / Family response to discharge plan:  Pt is understanding and in agreement with the DC plan to have her discharge to [facility] in [facility]. Pt's dtr (More) was on the phone during SW visit and she is in agreement as well. Pt said that she is glad she will be back in [place] tomorrow.     Persons notified of above discharge plan:  Pt, Pt's dtr (More), Charge RN (Shonna Hernandez), GynOnc Team (Sergo)    Staff Discharge Instructions:  SW faxed ([phone]) discharge orders and signed hard scripts for any controlled substances.  Please print a packet and send with patient.     CTS Handoff completed:  NO    Medicare Notice of Rights provided to the patient/family:  NO, Pt is not on Medicare.    BIRD Clayton, LGSW   Surgical Oncology Unit   [phone]  [phone]  Pager: [phone]

## 2017-10-03 NOTE — PROGRESS NOTES
Brief Gyn Onc Note    Called to room for evaluation. Pt was having a BM this evening and was straining hard - she has passed liquid stool now 4x today, but feels as though she has a larger solid BM to pass. She is not interested in a dulcolax. She is concerned because she had acute giovanna incisional pain while straining and is now sore diffusely in her abdomen.     Vitals:    10/01/17 1457 10/02/17 0012 10/02/17 1156 10/02/17 1524   BP: 117/55 113/65 112/64 105/63   BP Location: Left arm Left arm Left arm Left arm   Pulse:  81     Resp: 16 16 16 16   Temp: 97.8  F (36.6  C) 97.7  F (36.5  C) 97.2  F (36.2  C) 97.5  F (36.4  C)   TempSrc: Oral Oral Oral Oral   SpO2: 96% 95% 94% 96%   Weight:       Height:         Gen: NAD, a/o  CV: RRR  Pulm: No increased work of breathing  Abd: Abdominal wall intact with no palpable hernia or defect. Soft, tender, non distended, no guarding or rebound. Incision c/d/i without drainage. No induration or erythema. No fluctuance.  Ext: No edema, nontender, SCD's in place    A/P: Likely musculoskeletal pain secondary to straining, no palpable abdominal wall defect and I do not think sutures have been compromised. Notable liquid stool for the last 24h, pt describes as watery. Will obtain C dif given liquid stool and she has been on PO abx (however this does include flagyl). Reassured pt regarding abdominal exam and encouraged bowel regimen rather than straining if she believes she has a larger solid stool to pass.     Shahana Ospina MD  OB GYN PGY-3

## 2017-10-03 NOTE — PLAN OF CARE
Problem: Patient Care Overview  Goal: Plan of Care/Patient Progress Review  Outcome: Therapy, progress toward functional goals is gradual  AVSS. Pain managed with prn oxycodone, motrin, robaxin, cymbalta, and gabapentin. Patient states that pain is getting less. Tolerating regular diet. Denies nausea. No BM this shift, passing gas. Abdominal incision c/d/i. Up ad alda in room. Showered today. Plan is to discharge to hospital in [place], [place] tomorrow. Continue plan of care.

## 2017-10-03 NOTE — PLAN OF CARE
VSS on RA. Neutropenic precautions. Getting up to bathroom and commode independently. Voiding, pt didn't save. States she has been having loose BM's today, stool softeners held. Reg diet, ate small amount. ABD pad in place over abdominal incision, scant drainage. Pain tolerable, taking PRN oxycodone and robaxin when available. RUE PICC saline locked. Continues to have numbness/tingling from knees to feet. Continue with POC.    2230 - MD notified that pt has had 3 loose/watery stools today. Enteric isolation ordered and stool sample sent to lab to test for C. Diff.

## 2017-10-03 NOTE — PLAN OF CARE
Problem: Patient Care Overview  Goal: Plan of Care/Patient Progress Review  Discharge Planner PT 7C  Patient plan for discharge: ARU  Current status: Pt ambulated 200ft with close SBA. Pt declined wearing gait belt and socks due to decreased sensation to B feet. No LOB, however mild unsteadiness noted when fatigued.  (I) in bed mobility and sit<>stand.  Barriers to return to prior living situation: decreased LE strength, impaired gait and balance  Recommendations for discharge: ARU  Rationale for recommendations: Pt is young and motivated to return to prior level of function.  Demonstrates gait and balance deficits below baseline, would benefit from skilled intervention.       Entered by: Stephania Menendez 10/03/2017 4:06 PM

## 2017-10-03 NOTE — PLAN OF CARE
Problem: Patient Care Overview  Goal: Plan of Care/Patient Progress Review  OT 7C: anam pt declining participation in therapy upon AM attempt, unable to check back in PM 2/2 schedule demands, will reschedule.

## 2017-10-03 NOTE — PROGRESS NOTES
Gynecology Oncology Progress Note    Date: 10/3/2017     HD#9, POD#6  Procedure: Modified Radical LISA, BSO, bilateral ureterolysis, cystoscopy, proctoscopy    Disease: Left complex cystic mass of left adnexa, benign endometriosis on frozen    24 hour events:   - Pain control adequate on oral regimen  - Diarrhea x4, C dif negative  - Acute onset giovanna incisional with straining during bowel movement; now resolved  - PMR no longer recommends ARU, instead TCU  - Accepted to [facility] in [facility] [place], operated by [facility] as there are no TCU's in the area  - Hematology consult with normal smear, flow cytometry, B12/folate; pending viral panel - recommend ongoing CBC monitoring after discharge/transfer with possible outpatient bone marrow biopsy if not improved  - Final pathology, as noted below, benign    Subjective: Doing well this am, pain has improved since last evening. Thinks she will be able to work with PMR/PT/OT today. Would like to travel to [place] today. No N/V. Passing flatus, had diarrhea x4 yesterday, none additional overnight. Tolerating regular diet. Ambulating with baseline difficulty and pain but no dizziness. Ongoing slow improvement in extremity weakness and numbness.    Objective:   Patient Vitals for the past 24 hrs:   BP Temp Temp src Pulse Heart Rate Resp SpO2   10/02/17 2350 103/58 98.2  F (36.8  C) Oral 81 - 16 96 %   10/02/17 1524 105/63 97.5  F (36.4  C) Oral - 72 16 96 %   10/02/17 1156 112/64 97.2  F (36.2  C) Oral - 72 16 94 %     EXAM:   General: appears fatigued but interactive, in NAD  CV: regular rate and rhythm  Resp: CTAB, no crackles  Abdomen: soft, appropriately tender, mildly distended  Incision: well healed vertical midline incision, clean/dry with no evidence of surrounding erythema, drainage or induration.   MSK/Neuro: Moves all extremities spontaneously, strength grossly normal  Extremities: nontender, trace nonpitting edema, SCDs not in place    I/O last 3 completed shifts:  In:  1030 [P.O.:1010; I.V.:20]  Out: 200 [Urine:200]     Intake: 24h // overnight (mL)  PO: 1010 // 240  IV: 20 // 0  Output: 24h // overnight (mL)  UOP: 200 + 5 unmeasured void // 200  Stool: 5 unmeasured stools      Lines/Drans: PICC    Labs:   Hgb 11.1 (OSH)>12>11.1>>9.5>pending  Plts 188 >>294>>pending  WBC 3.87 (OSH)>3.2>>3.9>1.6>2.6>pending  ANC 0.6>1.0>0.7>pending  B12 478  Folate 13.8  C dif negative   Na 127>132 (OSH)>135>>>139 (9/30)  Cr 0.67 (9/30)  K 4.4 (9/30)    Flow cytometry: Polytypic B cells, no aberrant immunophenotype on T cells, rare to absent blasts, no evidence of malignancy    Peripheral smear: Slight normochromic, normocytic anemia with minimal poikilocytosis; moderate leukopenia and neutropenia    Final pathology: Uterus, cervix, bilateral tubes and ovaries   - Chronic cervicitis   - Weakly proliferative endometrium   - Adenomyosis   - Endometriosis and chronic salpingitis, left fallopian tube   - Endometriotic cyst, left ovary   - Endometriosis and cortical inclusion cysts, right ovary   - Right adnexal endometriosis   - Negative for malignancy     Pending cultures: None to report; CSF culture negative at OSH    Imaging: None to report    Active Problem list:  Postoperative state  Suboptimal pain control  Left complex cystic adnexal mass, elevated  - c/w endometriosis on frozen  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 She was transferred for surgical management of left adnexal mass w/ concern  for possible CA. Now POD#6 s/p modified radical LISA, BSO, cystoscopy, proctoscopy, bilateral ureterolysis on 9/27 with Dr. Cordero. Postoperative course complicated by poor pain control secondary to subacute neuropathic pain and postoperative pain. Now meeting goals for discharge with anticipated discharge today to [facility].     Dz:   - Endometriosis, final pathology reviewed with patient  FEN:   - Tolerating regular diet. MIVF off. Hyponatremia on admission, resolved.   Pain:   - s/p TAP blocks & dialudid PCA with poor pain control postoperatively  - Continue oxycodone 15mg q4h, encouraged weaning to 10mg with patient  - Continue non opioid pain management including Gabapentin 1200 mg TID, ibuprofen and tylenol, Cymbalta robaxin, neurontin and Ketalar cream for neuropathic pain.  - Appreciate pain service involvement  Heme:   - Stable acute blood loss anemia with no signs of ongoing bleeding on exam  - Neutropenia/Leukopenia: Unexplained, appreciate hematology recommendations. Appropriately elevated reticulocyte response with otherwise normal workup including flow cytometry, smear, B12/folate; pending hepatitis viral panel.   - Heme recommends outpatient CBC trends after discharge and consideration of outpatient bone marrow biopsy if ongoing.  CV:   - No current issues  Pulm:   - Continue to monitor negative inspiratory force/FVC q12h per neuro recs; within normal parameters.  - Encourage incentive spirometer.  GI:   - Continue senna colace and dulcolax, hold for loose stools; C dif negative - likely iatrogenic diarrhea.  - PRN anti emetics, zantac and simethicone available  - Appreciate adequate return of bowel function  :   - No current issues, voiding spontaneous s/p mendez with normal UA for urinary frequency on POD#3  ID:   - Neutropenic precautions, neutropenic as above  - Concern for diverticular abscess intraop: D#7/14 flagyl/cipro  Endocrine:   - No issues  Psych/Neuro/MSK:   -  Guillain-[place] Syndrome: Initial concern for paraneoplastic syndrome however pathology reveals benign endometriosis. See prior notes for comprehensive workup; s/p IVIG x5d 9/18 with improvement. Neurology has signed off and recommends follow up with neurology in 4-8 weeks post discharge.  - Continue q12h neuro checks and BID negative inspiratory force and FVCs while admitted   - To TCU per PM&R and PT/OT, will continue rehabilitation on outpatient basis  - Appreciate gradual improvement in LE and UE weakness and pain symptoms  PPX:   - Lovenox PPX, consider home lovenox x4w postoperatively given deconditioning and prolonged immobility; pt declining SCD's; encourage ambulation and IS    Disposition: Anticipate discharge today to [place] if transportation arrangements and transfer is accepted    Shahana Ospina MD  OB GYN PGY-3  10/3/2017  .    I saw and evaluated the patient. I agree with the findings and the plan of care as documented in Dr. Ospina 's note.     Laurie Jones MD  Gynecologic Oncology  Pager [phone]

## 2017-10-04 ENCOUNTER — CARE COORDINATION (OUTPATIENT)
Dept: CARE COORDINATION | Facility: CLINIC | Age: 47
End: 2017-10-04

## 2017-10-04 PROCEDURE — 25000132 ZZH RX MED GY IP 250 OP 250 PS 637: Performed by: NURSE PRACTITIONER

## 2017-10-04 PROCEDURE — 25000132 ZZH RX MED GY IP 250 OP 250 PS 637: Performed by: OBSTETRICS & GYNECOLOGY

## 2017-10-04 PROCEDURE — 99239 HOSP IP/OBS DSCHRG MGMT >30: CPT | Mod: GC | Performed by: OBSTETRICS & GYNECOLOGY

## 2017-10-04 RX ADMIN — METRONIDAZOLE 500 MG: 500 TABLET ORAL at 00:06

## 2017-10-04 RX ADMIN — METHOCARBAMOL 500 MG: 500 TABLET ORAL at 01:01

## 2017-10-04 RX ADMIN — IBUPROFEN 600 MG: 600 TABLET ORAL at 01:01

## 2017-10-04 RX ADMIN — OXYCODONE HYDROCHLORIDE 15 MG: 5 TABLET ORAL at 03:21

## 2017-10-04 NOTE — PLAN OF CARE
Problem: Patient Care Overview  Goal: Plan of Care/Patient Progress Review  AVSS. Pain managed with cymbalta, gabapentin, ibuprofen, and prn robaxin & oxycodone, per pt pain is slowly getting better. Regular diet tolerated, denies nausea, ate small amt d/t dislike of food. Passing flatus, pt reported 2 BMs today, held senna as stools have been liquidy. Abdominal incision liquid bandage CDI AIDEE. UAL in room, steady on feet. Uses walker for outside of room. Planning to DC to hospital in [place], [place] at 0600 this am 10/3, and to have pt's PICC pulled immediately prior to pt's discharge. Cont to provide emotional support, cont POC.

## 2017-10-04 NOTE — PLAN OF CARE
Problem: Patient Care Overview  Goal: Plan of Care/Patient Progress Review  Occupational Therapy Discharge Summary     Reason for therapy discharge:    Discharged to transitional care facility.     Progress towards therapy goal(s). See goals on Care Plan in [facility] electronic health record for goal details.  Goals partially met.  Barriers to achieving goals:   limited tolerance for therapy and discharge from facility.     Therapy recommendation(s):    Continued therapy is recommended.  Rationale/Recommendations:  Continue with skilled OT to maximize ADL I.

## 2017-10-04 NOTE — PLAN OF CARE
Problem: Patient Care Overview  Goal: Plan of Care/Patient Progress Review  Outcome: Adequate for Discharge Date Met:  10/04/17  VSS. Pain controlled with scheduled ibuprofen and PRN Oxy and Robaxin. Denies nausea. Regular diet. Abd incision CDI. Up with SBA +walker. BMx2 overnight. Voiding adequately. Pulled PICC, petroleum gauze and sterile dressing applied. Instructed pt to leave on for 24hours. Went over AVS with pt, no questions at this time. Gave report to Ludmila at [facility] to transport pt. Adequate for discharge.

## 2017-10-04 NOTE — PROGRESS NOTES
Gynecology Oncology Progress Note    Date: 10/4/2017     HD#10, POD#7  Procedure: Modified Radical LISA, BSO, bilateral ureterolysis, cystoscopy, proctoscopy    Disease: Left complex cystic mass of left adnexa, benign endometriosis on final pathology    24 hour events:   - Accepted to [facility]  - MRSA screening negative  - Hepatitis antibodies reveal history of infection with positive hepatitis B core antibody, negative surface antigen, and positive surface antibody    Subjective: Doing well this am, stable well controlled pain. Looking forward to traveling to [place]. No N/V. Passing flatus, had normal BM overnight. Tolerating regular diet. Ambulating with improvement daily. Ongoing slow improvement in extremity weakness and numbness.    Objective:   Patient Vitals for the past 24 hrs:   BP Temp Temp src Pulse Heart Rate Resp SpO2   10/03/17 2340 104/67 97.7  F (36.5  C) Oral - 76 18 97 %   10/03/17 1600 110/70 - - - 76 - -   10/03/17 1221 113/77 - - - 98 16 97 %   10/03/17 0730 99/57 96.9  F (36.1  C) Oral 74 74 16 92 %     EXAM:   General: A/O, in NAD, pleasant  CV: regular rate and rhythm  Resp: CTAB, no crackles  Abdomen: soft, appropriately tender, mildly distended  Incision: well healed vertical midline incision, clean/dry with no evidence of surrounding erythema, drainage or induration, staples in place  MSK/Neuro: Moves all extremities spontaneously, strength grossly normal  Extremities: nontender, trace nonpitting edema, SCDs not in place    I/O last 3 completed shifts:  In: 1000 [P.O.:960; I.V.:40]  Out: 650 [Urine:650]     Intake: 24h // overnight (mL)  PO: 960// 0 charted  IV: 40 // 0  Output: 24h // overnight (mL)  UOP: 650 + 1 unmeasured void // 2 unmeasured voids  Stool: 1 unmeasured stool // 1 unmeasured stool      Lines/Drans: PICC, to be removed this am    Labs:   Hepatitis C nonreactive  Hepatitis B core antibody reactive  Hepatitis B surface antibody positive 345.8  Hepatitis B surface  antigen negative    Notable lab trends:  Hgb 11.1 (OSH)>12>11.1>>9.8  Plts 188 >>294>>301  WBC 3.87 (OSH)>3.2>>3.9>1.6>2.6>3.5  ANC 0.6>1.0>0.7>1.4    Na 127>132 (OSH)>135>>>139 (9/30)  Cr 0.67 (9/30)  K 4.4 (9/30)    Pending cultures: None to report; CSF culture negative at OSH    Imaging: None to report    Active Problem list:  Postoperative state  Suboptimal pain control  Left complex cystic adnexal mass, elevated  - c/w endometriosis on frozen  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 She was transferred for surgical management of left adnexal mass w/ concern for possible CA. Now POD#7 s/p modified radical LISA, BSO, cystoscopy, proctoscopy, bilateral ureterolysis on 9/27 with Dr. Cordero. Pathology reveals benign endometriosis. Postoperative course complicated by poor pain control secondary to subacute neuropathic pain and postoperative pain. Now meeting goals for discharge with anticipated discharge today to [facility].     Dz:   - Endometriosis, final pathology reviewed with patient  FEN:   - Tolerating regular diet. MIVF off. Hyponatremia on admission, resolved.   Pain:   - Continue oxycodone 15mg q4h, encouraged weaning to 10mg with patient and reviewed no refills will be provided  - Continue non opioid pain management including Gabapentin 1200 mg TID, ibuprofen and tylenol, Cymbalta robaxin, neurontin and Ketalar cream for neuropathic pain.  Heme:   -  Stable acute blood loss anemia with no signs of ongoing bleeding on exam  - Neutropenia/Leukopenia: Unexplained, appreciate hematology recommendations. Appropriately elevated reticulocyte response with otherwise normal workup including flow cytometry, smear, B12/folate.  - Hepatitis labs reveal history of hepatitis B infection, no active infection; should not delay discharge but will discuss with GI today and notify patient if any changes in recommendations  - Heme recommends outpatient CBC trends after discharge and consideration of outpatient bone marrow biopsy if ongoing.   CV:   - No current issues  Pulm:   - Negative inspiratory force/FVC q12h per neuro recs; within normal parameters.  - Encourage incentive spirometer.  GI:   - Continue senna colace and dulcolax, hold for loose stools, diarrhea improved  - PRN anti emetics, zantac and simethicone available  - Appreciate adequate return of bowel function  :   - No current issues, voiding spontaneous s/p mendez with normal UA for urinary frequency on POD#3  ID:   - Neutropenic precautions, neutropenic as above with improving trend  - Concern for diverticular abscess intraop: D#8/14 flagyl/cipro  - MRSA screening negative to ensure accepted transfer to [place]  Endocrine:   - No issues  Psych/Neuro/MSK:   - Guillain-[place] Syndrome: Initial concern for paraneoplastic syndrome however pathology reveals benign endometriosis. See prior notes for comprehensive workup; s/p IVIG x5d 9/18 with improvement. Neurology has signed off and recommends follow up with neurology in 4-8 weeks post discharge.  - Continue q12h neuro checks and BID negative inspiratory force and FVCs while admitted   - To TCU per PM&R and PT/OT, will continue rehabilitation on outpatient basis  - Appreciate gradual improvement in LE and UE weakness and pain symptoms  PPX:   - Lovenox PPX, plan home lovenox x4w postoperatively given deconditioning and prolonged immobility; pt declining SCD's; encourage  ambulation and IS     Disposition: Anticipate discharge today to [place] Ospina MD  OB GYN PGY-3  10/4/2017  5:52 AM

## 2017-10-05 NOTE — PLAN OF CARE
Problem: Patient Care Overview  Goal: Plan of Care/Patient Progress Review                             Physical Therapy Discharge Summary     Reason for therapy discharge:    Discharged from facility to transitional care facility.     Progress towards therapy goal(s). See goals on Care Plan in [facility] electronic health record for goal details.  Not all goals met. Barriers to meeting goals: DC from facility.     Therapy recommendation(s):    TCU to improve functional strength and endurance

## 2018-01-05 ENCOUNTER — DOCUMENTATION ONLY (OUTPATIENT)
Dept: OTHER | Facility: CLINIC | Age: 48
End: 2018-01-05

## 2018-01-05 PROBLEM — Z71.89 ADVANCED DIRECTIVES, COUNSELING/DISCUSSION: Chronic | Status: ACTIVE | Noted: 2018-01-05

## 2018-01-16 ENCOUNTER — DOCUMENTATION ONLY (OUTPATIENT)
Dept: OTHER | Facility: CLINIC | Age: 48
End: 2018-01-16

## 2018-03-18 NOTE — PLAN OF CARE
Problem: Patient Care Overview  Goal: Plan of Care/Patient Progress Review  OT-7C: Pt required moderate encouragement to participate. Therapist reviewed abdominal precautions and safety with functional transfers/ADLs. Pt required CGA/Min A and vc's for transfer supine<->sittting and sit<->standing. Pt completed self cares standing at sink. Pt completed 4 BUE AROM exercises x 12 reps with vc's and demo. VSS.    REC: Discharge to TCU.

## 2024-06-17 PROBLEM — Z71.89 ADVANCED DIRECTIVES, COUNSELING/DISCUSSION: Status: RESOLVED | Noted: 2018-01-05 | Resolved: 2024-06-17

## (undated) DEVICE — HEMOSTAT ABSORBABLE AGENT ARISTA 3GM POWDER SM0002-USA

## (undated) DEVICE — SU VICRYL 0 CT-1 CR 8X27" UND JJ41G

## (undated) DEVICE — SU VICRYL 3-0 SH 27" J316H

## (undated) DEVICE — SU MONOCRYL 4-0 PS-2 27" UND Y426H

## (undated) DEVICE — SOL NACL 0.9% IRRIG 1000ML BOTTLE 2F7124

## (undated) DEVICE — SU ETHILON 2-0 FS 18" 664H

## (undated) DEVICE — SOL WATER IRRIG 1000ML BOTTLE 2F7114

## (undated) DEVICE — ESU GROUND PAD ADULT W/CORD E7507

## (undated) DEVICE — PREP SKIN SCRUB TRAY 4461A

## (undated) DEVICE — PITCHER STERILE 1000ML  SSK9004A

## (undated) DEVICE — LINEN TOWEL PACK X30 5481

## (undated) DEVICE — SU VICRYL 2-0 CT-1 27" UND J259H

## (undated) DEVICE — LINEN TOWEL PACK X6 WHITE 5487

## (undated) DEVICE — BNDG ABDOMINAL BINDER 15X46-62"  08140562

## (undated) DEVICE — SU VICRYL 2-0 SH 27" UND J417H

## (undated) DEVICE — SU VICRYL 0 CT-1 36" J346H

## (undated) DEVICE — GLOVE PROTEXIS BLUE W/NEU-THERA 6.0  2D73EB60

## (undated) DEVICE — DRAIN JACKSON PRATT 15FR ROUND SU130-1323

## (undated) DEVICE — SU PDS II 1 TP-1 48" Z880G

## (undated) DEVICE — WIPES FOLEY CARE SURESTEP PROVON DFC100

## (undated) DEVICE — PREP TECHNI-CARE CHLOROXYLENOL 3% 4OZ BOTTLE C222-4ZWO

## (undated) DEVICE — SOL NACL 0.9% INJ 1000ML BAG 2B1324X

## (undated) DEVICE — TUBING IRRIG CYSTO/BLADDER SET 81" LF 2C4040

## (undated) DEVICE — BLADE CLIPPER SGL USE 9680

## (undated) DEVICE — GLOVE PROTEXIS MICRO 6.0  2D73PM60

## (undated) DEVICE — SU VICRYL 0 TIE 54" J608H

## (undated) DEVICE — DRAIN JACKSON PRATT RESERVOIR 100ML SU130-1305

## (undated) DEVICE — DRAPE LEGGINGS CLEAR 8430

## (undated) DEVICE — PACK AB HYST II

## (undated) DEVICE — SU PDS II 0 TP-1 60" Z991G

## (undated) DEVICE — PREP CHLORAPREP 26ML TINTED ORANGE  260815

## (undated) RX ORDER — FENTANYL CITRATE 50 UG/ML
INJECTION, SOLUTION INTRAMUSCULAR; INTRAVENOUS
Status: DISPENSED
Start: 2017-09-27

## (undated) RX ORDER — HEPARIN SODIUM 5000 [USP'U]/.5ML
INJECTION, SOLUTION INTRAVENOUS; SUBCUTANEOUS
Status: DISPENSED
Start: 2017-09-27

## (undated) RX ORDER — PHENAZOPYRIDINE HYDROCHLORIDE 200 MG/1
TABLET, FILM COATED ORAL
Status: DISPENSED
Start: 2017-09-27

## (undated) RX ORDER — HEPARIN SODIUM 1000 [USP'U]/ML
INJECTION, SOLUTION INTRAVENOUS; SUBCUTANEOUS
Status: DISPENSED
Start: 2017-09-27

## (undated) RX ORDER — PHENYLEPHRINE HCL IN 0.9% NACL 1 MG/10 ML
SYRINGE (ML) INTRAVENOUS
Status: DISPENSED
Start: 2017-09-27

## (undated) RX ORDER — LIDOCAINE HYDROCHLORIDE 20 MG/ML
INJECTION, SOLUTION EPIDURAL; INFILTRATION; INTRACAUDAL; PERINEURAL
Status: DISPENSED
Start: 2017-09-27

## (undated) RX ORDER — ONDANSETRON 2 MG/ML
INJECTION INTRAMUSCULAR; INTRAVENOUS
Status: DISPENSED
Start: 2017-09-27

## (undated) RX ORDER — KETOROLAC TROMETHAMINE 30 MG/ML
INJECTION, SOLUTION INTRAMUSCULAR; INTRAVENOUS
Status: DISPENSED
Start: 2017-09-27

## (undated) RX ORDER — EPHEDRINE SULFATE 50 MG/ML
INJECTION, SOLUTION INTRAMUSCULAR; INTRAVENOUS; SUBCUTANEOUS
Status: DISPENSED
Start: 2017-09-27

## (undated) RX ORDER — ROCURONIUM BROMIDE 50 MG/5 ML
SYRINGE (ML) INTRAVENOUS
Status: DISPENSED
Start: 2017-09-27

## (undated) RX ORDER — DEXAMETHASONE SODIUM PHOSPHATE 4 MG/ML
INJECTION, SOLUTION INTRA-ARTICULAR; INTRALESIONAL; INTRAMUSCULAR; INTRAVENOUS; SOFT TISSUE
Status: DISPENSED
Start: 2017-09-27

## (undated) RX ORDER — PROPOFOL 10 MG/ML
INJECTION, EMULSION INTRAVENOUS
Status: DISPENSED
Start: 2017-09-27